# Patient Record
Sex: FEMALE | Race: BLACK OR AFRICAN AMERICAN | NOT HISPANIC OR LATINO | Employment: OTHER | URBAN - METROPOLITAN AREA
[De-identification: names, ages, dates, MRNs, and addresses within clinical notes are randomized per-mention and may not be internally consistent; named-entity substitution may affect disease eponyms.]

---

## 2017-05-04 RX ORDER — DONEPEZIL HYDROCHLORIDE 10 MG/1
10 TABLET, FILM COATED ORAL DAILY
Qty: 90 TABLET | Refills: 6 | OUTPATIENT
Start: 2017-05-04

## 2017-05-04 NOTE — TELEPHONE ENCOUNTER
Received a refill request from the patient's pharmacy for Aricept. The refill was denied by Jhoana Waite since she has not seen the patient is 2 years. Jhoana stated that if the patient would schedule a follow up, she will go ahead and give her a one month supply to last until her visit. Contacted the patient to inform her. No answer, No option to leave a voicemail. I will call again at a later time.

## 2017-07-06 ENCOUNTER — TELEPHONE (OUTPATIENT)
Dept: OPTOMETRY | Facility: CLINIC | Age: 77
End: 2017-07-06

## 2017-09-15 ENCOUNTER — TELEPHONE (OUTPATIENT)
Dept: INTERNAL MEDICINE | Facility: CLINIC | Age: 77
End: 2017-09-15

## 2017-09-15 DIAGNOSIS — E11.9 DIABETES MELLITUS TYPE 2, NONINSULIN DEPENDENT: Primary | ICD-10-CM

## 2017-09-15 NOTE — TELEPHONE ENCOUNTER
Please call patient.  They are overdue for fasting labs and an annual physical.  Please help schedule both.  If they are no longer coming here for their care please let me know so we can update their chart.

## 2017-09-18 ENCOUNTER — PATIENT OUTREACH (OUTPATIENT)
Dept: ADMINISTRATIVE | Facility: HOSPITAL | Age: 77
End: 2017-09-18

## 2017-09-18 NOTE — PROGRESS NOTES
LPN, CCC attempted to contact patient to assist with scheduling an Annual Diabetes PCP OV with labs prior.VM option not provided. Also attempted to contact patient via [phone], with multiple unanswered rings received. N, [facility] will mail patient a reminder letter.

## 2017-09-20 NOTE — TELEPHONE ENCOUNTER
Please remove from my patient list.  Thanks    Unsuccessful phone contact outreach to patient per LPN, CCC. Letter mailed.  (Routing comment)

## 2019-01-01 ENCOUNTER — HOSPITAL ENCOUNTER (INPATIENT)
Facility: HOSPITAL | Age: 79
LOS: 6 days | DRG: 064 | End: 2019-04-13
Attending: EMERGENCY MEDICINE | Admitting: PSYCHIATRY & NEUROLOGY
Payer: MEDICARE

## 2019-01-01 VITALS
HEART RATE: 96 BPM | HEIGHT: 65 IN | OXYGEN SATURATION: 99 % | SYSTOLIC BLOOD PRESSURE: 125 MMHG | TEMPERATURE: 99 F | RESPIRATION RATE: 14 BRPM | BODY MASS INDEX: 23.69 KG/M2 | WEIGHT: 142.19 LBS | DIASTOLIC BLOOD PRESSURE: 56 MMHG

## 2019-01-01 DIAGNOSIS — G93.6 CYTOTOXIC CEREBRAL EDEMA: ICD-10-CM

## 2019-01-01 DIAGNOSIS — E03.9 ACQUIRED HYPOTHYROIDISM: ICD-10-CM

## 2019-01-01 DIAGNOSIS — J96.01 ACUTE RESPIRATORY FAILURE WITH HYPOXIA: ICD-10-CM

## 2019-01-01 DIAGNOSIS — I10 HYPERTENSION, UNSPECIFIED TYPE: ICD-10-CM

## 2019-01-01 DIAGNOSIS — E11.42 TYPE 2 DIABETES MELLITUS WITH DIABETIC POLYNEUROPATHY, WITH LONG-TERM CURRENT USE OF INSULIN: ICD-10-CM

## 2019-01-01 DIAGNOSIS — N18.5 STAGE 5 CHRONIC KIDNEY DISEASE NOT ON CHRONIC DIALYSIS: ICD-10-CM

## 2019-01-01 DIAGNOSIS — Z71.89 ADVANCED CARE PLANNING/COUNSELING DISCUSSION: ICD-10-CM

## 2019-01-01 DIAGNOSIS — Z78.9: ICD-10-CM

## 2019-01-01 DIAGNOSIS — R40.2434 GLASGOW COMA SCALE TOTAL SCORE 3-8, 24 HOURS OR MORE AFTER HOSPITAL ADMISSION: ICD-10-CM

## 2019-01-01 DIAGNOSIS — Z97.8 ENDOTRACHEALLY INTUBATED: ICD-10-CM

## 2019-01-01 DIAGNOSIS — G93.5 BRAIN COMPRESSION: ICD-10-CM

## 2019-01-01 DIAGNOSIS — Z73.6 LIMITATION OF ACTIVITIES DUE TO DISABILITY: ICD-10-CM

## 2019-01-01 DIAGNOSIS — I99.8 ISCHEMIA OF HAND: ICD-10-CM

## 2019-01-01 DIAGNOSIS — Z51.5 PALLIATIVE CARE ENCOUNTER: ICD-10-CM

## 2019-01-01 DIAGNOSIS — I63.512 ACUTE ISCHEMIC LEFT MIDDLE CEREBRAL ARTERY (MCA) STROKE: Primary | ICD-10-CM

## 2019-01-01 DIAGNOSIS — Z79.4 TYPE 2 DIABETES MELLITUS WITH DIABETIC POLYNEUROPATHY, WITH LONG-TERM CURRENT USE OF INSULIN: ICD-10-CM

## 2019-01-01 DIAGNOSIS — R73.9 HYPERGLYCEMIA: ICD-10-CM

## 2019-01-01 DIAGNOSIS — I63.411 EMBOLIC STROKE INVOLVING RIGHT MIDDLE CEREBRAL ARTERY: ICD-10-CM

## 2019-01-01 DIAGNOSIS — I61.1 NONTRAUMATIC CORTICAL HEMORRHAGE OF RIGHT CEREBRAL HEMISPHERE: ICD-10-CM

## 2019-01-01 DIAGNOSIS — I63.9 STROKE: ICD-10-CM

## 2019-01-01 DIAGNOSIS — Z71.89 GOALS OF CARE, COUNSELING/DISCUSSION: ICD-10-CM

## 2019-01-01 DIAGNOSIS — N17.9 ACUTE RENAL FAILURE, UNSPECIFIED ACUTE RENAL FAILURE TYPE: ICD-10-CM

## 2019-01-01 LAB
ABO + RH BLD: NORMAL
ALBUMIN SERPL BCP-MCNC: 1.4 G/DL (ref 3.5–5.2)
ALBUMIN SERPL BCP-MCNC: 1.5 G/DL (ref 3.5–5.2)
ALBUMIN SERPL BCP-MCNC: 1.5 G/DL (ref 3.5–5.2)
ALBUMIN SERPL BCP-MCNC: 1.6 G/DL (ref 3.5–5.2)
ALBUMIN SERPL BCP-MCNC: 1.7 G/DL (ref 3.5–5.2)
ALBUMIN SERPL BCP-MCNC: 1.8 G/DL (ref 3.5–5.2)
ALBUMIN SERPL BCP-MCNC: 1.9 G/DL (ref 3.5–5.2)
ALBUMIN SERPL BCP-MCNC: 2 G/DL (ref 3.5–5.2)
ALBUMIN SERPL BCP-MCNC: 2 G/DL (ref 3.5–5.2)
ALBUMIN SERPL BCP-MCNC: 2.1 G/DL (ref 3.5–5.2)
ALBUMIN SERPL BCP-MCNC: 2.2 G/DL (ref 3.5–5.2)
ALBUMIN SERPL BCP-MCNC: 2.3 G/DL (ref 3.5–5.2)
ALBUMIN SERPL BCP-MCNC: 2.5 G/DL (ref 3.5–5.2)
ALBUMIN SERPL BCP-MCNC: 2.5 G/DL (ref 3.5–5.2)
ALBUMIN SERPL BCP-MCNC: 2.6 G/DL (ref 3.5–5.2)
ALBUMIN SERPL BCP-MCNC: 2.8 G/DL (ref 3.5–5.2)
ALBUMIN SERPL BCP-MCNC: 2.9 G/DL (ref 3.5–5.2)
ALBUMIN SERPL BCP-MCNC: 3 G/DL (ref 3.5–5.2)
ALBUMIN SERPL BCP-MCNC: 3 G/DL (ref 3.5–5.2)
ALBUMIN SERPL BCP-MCNC: 3.2 G/DL (ref 3.5–5.2)
ALBUMIN SERPL BCP-MCNC: 3.8 G/DL (ref 3.5–5.2)
ALLENS TEST: ABNORMAL
ALP SERPL-CCNC: 100 U/L (ref 55–135)
ALP SERPL-CCNC: 106 U/L (ref 55–135)
ALP SERPL-CCNC: 108 U/L (ref 55–135)
ALP SERPL-CCNC: 116 U/L (ref 55–135)
ALP SERPL-CCNC: 118 U/L (ref 55–135)
ALP SERPL-CCNC: 123 U/L (ref 55–135)
ALP SERPL-CCNC: 131 U/L (ref 55–135)
ALP SERPL-CCNC: 132 U/L (ref 55–135)
ALP SERPL-CCNC: 132 U/L (ref 55–135)
ALP SERPL-CCNC: 142 U/L (ref 55–135)
ALP SERPL-CCNC: 148 U/L (ref 55–135)
ALP SERPL-CCNC: 155 U/L (ref 55–135)
ALP SERPL-CCNC: 182 U/L (ref 55–135)
ALP SERPL-CCNC: 81 U/L (ref 55–135)
ALP SERPL-CCNC: 83 U/L (ref 55–135)
ALP SERPL-CCNC: 84 U/L (ref 55–135)
ALP SERPL-CCNC: 89 U/L (ref 55–135)
ALP SERPL-CCNC: 90 U/L (ref 55–135)
ALP SERPL-CCNC: 90 U/L (ref 55–135)
ALP SERPL-CCNC: 91 U/L (ref 55–135)
ALP SERPL-CCNC: 92 U/L (ref 55–135)
ALP SERPL-CCNC: 92 U/L (ref 55–135)
ALP SERPL-CCNC: 94 U/L (ref 55–135)
ALP SERPL-CCNC: 95 U/L (ref 55–135)
ALP SERPL-CCNC: 95 U/L (ref 55–135)
ALP SERPL-CCNC: 96 U/L (ref 55–135)
ALT SERPL W/O P-5'-P-CCNC: 15 U/L (ref 10–44)
ALT SERPL W/O P-5'-P-CCNC: 16 U/L (ref 10–44)
ALT SERPL W/O P-5'-P-CCNC: 18 U/L (ref 10–44)
ALT SERPL W/O P-5'-P-CCNC: 18 U/L (ref 10–44)
ALT SERPL W/O P-5'-P-CCNC: 22 U/L (ref 10–44)
ALT SERPL W/O P-5'-P-CCNC: 23 U/L (ref 10–44)
ALT SERPL W/O P-5'-P-CCNC: 23 U/L (ref 10–44)
ALT SERPL W/O P-5'-P-CCNC: 25 U/L (ref 10–44)
ALT SERPL W/O P-5'-P-CCNC: 30 U/L (ref 10–44)
ALT SERPL W/O P-5'-P-CCNC: 32 U/L (ref 10–44)
ALT SERPL W/O P-5'-P-CCNC: 32 U/L (ref 10–44)
ALT SERPL W/O P-5'-P-CCNC: 33 U/L (ref 10–44)
ALT SERPL W/O P-5'-P-CCNC: 40 U/L (ref 10–44)
ALT SERPL W/O P-5'-P-CCNC: 45 U/L (ref 10–44)
ALT SERPL W/O P-5'-P-CCNC: 47 U/L (ref 10–44)
ALT SERPL W/O P-5'-P-CCNC: 53 U/L (ref 10–44)
ALT SERPL W/O P-5'-P-CCNC: 61 U/L (ref 10–44)
ALT SERPL W/O P-5'-P-CCNC: 63 U/L (ref 10–44)
ALT SERPL W/O P-5'-P-CCNC: 66 U/L (ref 10–44)
ALT SERPL W/O P-5'-P-CCNC: 66 U/L (ref 10–44)
ALT SERPL W/O P-5'-P-CCNC: 68 U/L (ref 10–44)
ALT SERPL W/O P-5'-P-CCNC: 69 U/L (ref 10–44)
ALT SERPL W/O P-5'-P-CCNC: 70 U/L (ref 10–44)
ALT SERPL W/O P-5'-P-CCNC: 74 U/L (ref 10–44)
ANION GAP SERPL CALC-SCNC: 10 MMOL/L (ref 8–16)
ANION GAP SERPL CALC-SCNC: 11 MMOL/L (ref 8–16)
ANION GAP SERPL CALC-SCNC: 11 MMOL/L (ref 8–16)
ANION GAP SERPL CALC-SCNC: 13 MMOL/L (ref 8–16)
ANION GAP SERPL CALC-SCNC: 13 MMOL/L (ref 8–16)
ANION GAP SERPL CALC-SCNC: 19 MMOL/L (ref 8–16)
ANION GAP SERPL CALC-SCNC: 29 MMOL/L (ref 8–16)
ANION GAP SERPL CALC-SCNC: 34 MMOL/L (ref 8–16)
ANION GAP SERPL CALC-SCNC: 9 MMOL/L (ref 8–16)
ANION GAP SERPL CALC-SCNC: ABNORMAL MMOL/L (ref 8–16)
ANISOCYTOSIS BLD QL SMEAR: SLIGHT
ASCENDING AORTA: 2.5 CM
AST SERPL-CCNC: 101 U/L (ref 10–40)
AST SERPL-CCNC: 102 U/L (ref 10–40)
AST SERPL-CCNC: 109 U/L (ref 10–40)
AST SERPL-CCNC: 114 U/L (ref 10–40)
AST SERPL-CCNC: 119 U/L (ref 10–40)
AST SERPL-CCNC: 120 U/L (ref 10–40)
AST SERPL-CCNC: 13 U/L (ref 10–40)
AST SERPL-CCNC: 133 U/L (ref 10–40)
AST SERPL-CCNC: 143 U/L (ref 10–40)
AST SERPL-CCNC: 145 U/L (ref 10–40)
AST SERPL-CCNC: 149 U/L (ref 10–40)
AST SERPL-CCNC: 153 U/L (ref 10–40)
AST SERPL-CCNC: 157 U/L (ref 10–40)
AST SERPL-CCNC: 177 U/L (ref 10–40)
AST SERPL-CCNC: 199 U/L (ref 10–40)
AST SERPL-CCNC: 20 U/L (ref 10–40)
AST SERPL-CCNC: 200 U/L (ref 10–40)
AST SERPL-CCNC: 208 U/L (ref 10–40)
AST SERPL-CCNC: 214 U/L (ref 10–40)
AST SERPL-CCNC: 23 U/L (ref 10–40)
AST SERPL-CCNC: 40 U/L (ref 10–40)
AST SERPL-CCNC: 67 U/L (ref 10–40)
AST SERPL-CCNC: 76 U/L (ref 10–40)
AST SERPL-CCNC: 87 U/L (ref 10–40)
AST SERPL-CCNC: 87 U/L (ref 10–40)
AST SERPL-CCNC: 89 U/L (ref 10–40)
AV MEAN GRADIENT: 6.21 MMHG
AV PEAK GRADIENT: 9.86 MMHG
B-OH-BUTYR BLD STRIP-SCNC: 6.4 MMOL/L (ref 0–0.5)
BACTERIA #/AREA URNS AUTO: ABNORMAL /HPF
BACTERIA UR CULT: NORMAL
BASOPHILS # BLD AUTO: 0.02 K/UL (ref 0–0.2)
BASOPHILS # BLD AUTO: 0.02 K/UL (ref 0–0.2)
BASOPHILS # BLD AUTO: 0.03 K/UL (ref 0–0.2)
BASOPHILS # BLD AUTO: 0.03 K/UL (ref 0–0.2)
BASOPHILS # BLD AUTO: 0.04 K/UL (ref 0–0.2)
BASOPHILS # BLD AUTO: 0.04 K/UL (ref 0–0.2)
BASOPHILS # BLD AUTO: 0.05 K/UL (ref 0–0.2)
BASOPHILS NFR BLD: 0.1 % (ref 0–1.9)
BASOPHILS NFR BLD: 0.1 % (ref 0–1.9)
BASOPHILS NFR BLD: 0.2 % (ref 0–1.9)
BASOPHILS NFR BLD: 0.3 % (ref 0–1.9)
BILIRUB SERPL-MCNC: 0.4 MG/DL (ref 0.1–1)
BILIRUB SERPL-MCNC: 0.4 MG/DL (ref 0.1–1)
BILIRUB SERPL-MCNC: 0.5 MG/DL (ref 0.1–1)
BILIRUB SERPL-MCNC: 0.6 MG/DL (ref 0.1–1)
BILIRUB SERPL-MCNC: 0.6 MG/DL (ref 0.1–1)
BILIRUB SERPL-MCNC: 0.7 MG/DL (ref 0.1–1)
BILIRUB SERPL-MCNC: 0.8 MG/DL (ref 0.1–1)
BILIRUB SERPL-MCNC: 0.9 MG/DL (ref 0.1–1)
BILIRUB SERPL-MCNC: 1 MG/DL (ref 0.1–1)
BILIRUB SERPL-MCNC: 1.1 MG/DL (ref 0.1–1)
BILIRUB SERPL-MCNC: 1.1 MG/DL (ref 0.1–1)
BILIRUB SERPL-MCNC: 1.2 MG/DL (ref 0.1–1)
BILIRUB UR QL STRIP: NEGATIVE
BLD GP AB SCN CELLS X3 SERPL QL: NORMAL
BSA FOR ECHO PROCEDURE: 1.82 M2
BUN SERPL-MCNC: 29 MG/DL (ref 8–23)
BUN SERPL-MCNC: 30 MG/DL (ref 8–23)
BUN SERPL-MCNC: 30 MG/DL (ref 8–23)
BUN SERPL-MCNC: 31 MG/DL (ref 8–23)
BUN SERPL-MCNC: 32 MG/DL (ref 8–23)
BUN SERPL-MCNC: 33 MG/DL (ref 8–23)
BUN SERPL-MCNC: 34 MG/DL (ref 8–23)
BUN SERPL-MCNC: 35 MG/DL (ref 8–23)
BUN SERPL-MCNC: 37 MG/DL (ref 8–23)
BUN SERPL-MCNC: 37 MG/DL (ref 8–23)
BUN SERPL-MCNC: 38 MG/DL (ref 8–23)
BUN SERPL-MCNC: 40 MG/DL (ref 8–23)
BUN SERPL-MCNC: 40 MG/DL (ref 8–23)
BUN SERPL-MCNC: 41 MG/DL (ref 8–23)
BUN SERPL-MCNC: 43 MG/DL (ref 8–23)
BUN SERPL-MCNC: 50 MG/DL (ref 8–23)
BUN SERPL-MCNC: 61 MG/DL (ref 8–23)
BUN SERPL-MCNC: 70 MG/DL (ref 8–23)
BUN SERPL-MCNC: 82 MG/DL (ref 8–23)
BUN SERPL-MCNC: 90 MG/DL (ref 8–23)
BUN SERPL-MCNC: 93 MG/DL (ref 6–30)
BUN SERPL-MCNC: 96 MG/DL (ref 8–23)
BURR CELLS BLD QL SMEAR: ABNORMAL
BURR CELLS BLD QL SMEAR: ABNORMAL
CALCIUM SERPL-MCNC: 10.6 MG/DL (ref 8.7–10.5)
CALCIUM SERPL-MCNC: 8 MG/DL (ref 8.7–10.5)
CALCIUM SERPL-MCNC: 8.2 MG/DL (ref 8.7–10.5)
CALCIUM SERPL-MCNC: 8.5 MG/DL (ref 8.7–10.5)
CALCIUM SERPL-MCNC: 8.6 MG/DL (ref 8.7–10.5)
CALCIUM SERPL-MCNC: 8.8 MG/DL (ref 8.7–10.5)
CALCIUM SERPL-MCNC: 8.9 MG/DL (ref 8.7–10.5)
CALCIUM SERPL-MCNC: 8.9 MG/DL (ref 8.7–10.5)
CALCIUM SERPL-MCNC: 9 MG/DL (ref 8.7–10.5)
CALCIUM SERPL-MCNC: 9.1 MG/DL (ref 8.7–10.5)
CALCIUM SERPL-MCNC: 9.2 MG/DL (ref 8.7–10.5)
CALCIUM SERPL-MCNC: 9.3 MG/DL (ref 8.7–10.5)
CALCIUM SERPL-MCNC: 9.3 MG/DL (ref 8.7–10.5)
CALCIUM SERPL-MCNC: 9.4 MG/DL (ref 8.7–10.5)
CALCIUM SERPL-MCNC: 9.5 MG/DL (ref 8.7–10.5)
CALCIUM SERPL-MCNC: 9.5 MG/DL (ref 8.7–10.5)
CALCIUM SERPL-MCNC: 9.6 MG/DL (ref 8.7–10.5)
CALCIUM SERPL-MCNC: 9.7 MG/DL (ref 8.7–10.5)
CALCIUM SERPL-MCNC: 9.9 MG/DL (ref 8.7–10.5)
CHLORIDE SERPL-SCNC: 110 MMOL/L (ref 95–110)
CHLORIDE SERPL-SCNC: 114 MMOL/L (ref 95–110)
CHLORIDE SERPL-SCNC: 118 MMOL/L (ref 95–110)
CHLORIDE SERPL-SCNC: 123 MMOL/L (ref 95–110)
CHLORIDE SERPL-SCNC: 124 MMOL/L (ref 95–110)
CHLORIDE SERPL-SCNC: 124 MMOL/L (ref 95–110)
CHLORIDE SERPL-SCNC: 125 MMOL/L (ref 95–110)
CHLORIDE SERPL-SCNC: 126 MMOL/L (ref 95–110)
CHLORIDE SERPL-SCNC: 126 MMOL/L (ref 95–110)
CHLORIDE SERPL-SCNC: 127 MMOL/L (ref 95–110)
CHLORIDE SERPL-SCNC: 128 MMOL/L (ref 95–110)
CHLORIDE SERPL-SCNC: 130 MMOL/L (ref 95–110)
CHLORIDE SERPL-SCNC: 130 MMOL/L (ref 95–110)
CHLORIDE SERPL-SCNC: >130 MMOL/L (ref 95–110)
CHOLEST SERPL-MCNC: 290 MG/DL (ref 120–199)
CHOLEST/HDLC SERPL: 5.9 {RATIO} (ref 2–5)
CLARITY UR REFRACT.AUTO: ABNORMAL
CO2 SERPL-SCNC: 11 MMOL/L (ref 23–29)
CO2 SERPL-SCNC: 15 MMOL/L (ref 23–29)
CO2 SERPL-SCNC: 19 MMOL/L (ref 23–29)
CO2 SERPL-SCNC: 21 MMOL/L (ref 23–29)
CO2 SERPL-SCNC: 22 MMOL/L (ref 23–29)
CO2 SERPL-SCNC: 23 MMOL/L (ref 23–29)
CO2 SERPL-SCNC: 24 MMOL/L (ref 23–29)
CO2 SERPL-SCNC: 25 MMOL/L (ref 23–29)
CO2 SERPL-SCNC: 26 MMOL/L (ref 23–29)
COLOR UR AUTO: YELLOW
CREAT SERPL-MCNC: 0.9 MG/DL (ref 0.5–1.4)
CREAT SERPL-MCNC: 1 MG/DL (ref 0.5–1.4)
CREAT SERPL-MCNC: 1.1 MG/DL (ref 0.5–1.4)
CREAT SERPL-MCNC: 1.2 MG/DL (ref 0.5–1.4)
CREAT SERPL-MCNC: 1.3 MG/DL (ref 0.5–1.4)
CREAT SERPL-MCNC: 1.3 MG/DL (ref 0.5–1.4)
CREAT SERPL-MCNC: 1.4 MG/DL (ref 0.5–1.4)
CREAT SERPL-MCNC: 1.5 MG/DL (ref 0.5–1.4)
CREAT SERPL-MCNC: 1.6 MG/DL (ref 0.5–1.4)
CREAT SERPL-MCNC: 1.6 MG/DL (ref 0.5–1.4)
CREAT SERPL-MCNC: 1.8 MG/DL (ref 0.5–1.4)
CREAT SERPL-MCNC: 1.8 MG/DL (ref 0.5–1.4)
CREAT SERPL-MCNC: 1.9 MG/DL (ref 0.5–1.4)
CREAT SERPL-MCNC: 2 MG/DL (ref 0.5–1.4)
CREAT SERPL-MCNC: 2.1 MG/DL (ref 0.5–1.4)
CREAT SERPL-MCNC: 2.9 MG/DL (ref 0.5–1.4)
CREAT SERPL-MCNC: 2.9 MG/DL (ref 0.5–1.4)
CREAT SERPL-MCNC: 3 MG/DL (ref 0.5–1.4)
CREAT SERPL-MCNC: 3.5 MG/DL (ref 0.5–1.4)
CREAT SERPL-MCNC: 3.9 MG/DL (ref 0.5–1.4)
CV ECHO LV RWT: 0.47 CM
DACRYOCYTES BLD QL SMEAR: ABNORMAL
DELSYS: ABNORMAL
DIFFERENTIAL METHOD: ABNORMAL
DOP CALC AO PEAK VEL: 1.57 M/S
DOP CALC AO VTI: 23.27 CM
DOP CALC LVOT AREA: 2.63 CM2
DOP CALC LVOT DIAMETER: 1.83 CM
E WAVE DECELERATION TIME: 458.09 MSEC
E/A RATIO: 0.43
E/E' RATIO: 7.14
ECHO LV POSTERIOR WALL: 0.65 CM (ref 0.6–1.1)
EOSINOPHIL # BLD AUTO: 0 K/UL (ref 0–0.5)
EOSINOPHIL # BLD AUTO: 0.1 K/UL (ref 0–0.5)
EOSINOPHIL # BLD AUTO: 0.2 K/UL (ref 0–0.5)
EOSINOPHIL NFR BLD: 0 % (ref 0–8)
EOSINOPHIL NFR BLD: 0.2 % (ref 0–8)
EOSINOPHIL NFR BLD: 0.2 % (ref 0–8)
EOSINOPHIL NFR BLD: 0.3 % (ref 0–8)
EOSINOPHIL NFR BLD: 1.2 % (ref 0–8)
ERYTHROCYTE [DISTWIDTH] IN BLOOD BY AUTOMATED COUNT: 13.4 % (ref 11.5–14.5)
ERYTHROCYTE [DISTWIDTH] IN BLOOD BY AUTOMATED COUNT: 13.4 % (ref 11.5–14.5)
ERYTHROCYTE [DISTWIDTH] IN BLOOD BY AUTOMATED COUNT: 13.9 % (ref 11.5–14.5)
ERYTHROCYTE [DISTWIDTH] IN BLOOD BY AUTOMATED COUNT: 14 % (ref 11.5–14.5)
ERYTHROCYTE [DISTWIDTH] IN BLOOD BY AUTOMATED COUNT: 14.3 % (ref 11.5–14.5)
ERYTHROCYTE [DISTWIDTH] IN BLOOD BY AUTOMATED COUNT: 14.6 % (ref 11.5–14.5)
ERYTHROCYTE [DISTWIDTH] IN BLOOD BY AUTOMATED COUNT: 14.7 % (ref 11.5–14.5)
ERYTHROCYTE [SEDIMENTATION RATE] IN BLOOD BY WESTERGREN METHOD: 14 MM/H
EST. GFR  (AFRICAN AMERICAN): 12.2 ML/MIN/1.73 M^2
EST. GFR  (AFRICAN AMERICAN): 13.9 ML/MIN/1.73 M^2
EST. GFR  (AFRICAN AMERICAN): 17.5 ML/MIN/1.73 M^2
EST. GFR  (AFRICAN AMERICAN): 25.8 ML/MIN/1.73 M^2
EST. GFR  (AFRICAN AMERICAN): 27.2 ML/MIN/1.73 M^2
EST. GFR  (AFRICAN AMERICAN): 28.9 ML/MIN/1.73 M^2
EST. GFR  (AFRICAN AMERICAN): 30.9 ML/MIN/1.73 M^2
EST. GFR  (AFRICAN AMERICAN): 31.1 ML/MIN/1.73 M^2
EST. GFR  (AFRICAN AMERICAN): 35.6 ML/MIN/1.73 M^2
EST. GFR  (AFRICAN AMERICAN): 35.6 ML/MIN/1.73 M^2
EST. GFR  (AFRICAN AMERICAN): 38.7 ML/MIN/1.73 M^2
EST. GFR  (AFRICAN AMERICAN): 41.8 ML/MIN/1.73 M^2
EST. GFR  (AFRICAN AMERICAN): 41.8 ML/MIN/1.73 M^2
EST. GFR  (AFRICAN AMERICAN): 42.1 ML/MIN/1.73 M^2
EST. GFR  (AFRICAN AMERICAN): 45.7 ML/MIN/1.73 M^2
EST. GFR  (AFRICAN AMERICAN): 45.7 ML/MIN/1.73 M^2
EST. GFR  (AFRICAN AMERICAN): 50.4 ML/MIN/1.73 M^2
EST. GFR  (AFRICAN AMERICAN): 56 ML/MIN/1.73 M^2
EST. GFR  (AFRICAN AMERICAN): >60 ML/MIN/1.73 M^2
EST. GFR  (NON AFRICAN AMERICAN): 10.6 ML/MIN/1.73 M^2
EST. GFR  (NON AFRICAN AMERICAN): 12.1 ML/MIN/1.73 M^2
EST. GFR  (NON AFRICAN AMERICAN): 15.1 ML/MIN/1.73 M^2
EST. GFR  (NON AFRICAN AMERICAN): 22.4 ML/MIN/1.73 M^2
EST. GFR  (NON AFRICAN AMERICAN): 23.6 ML/MIN/1.73 M^2
EST. GFR  (NON AFRICAN AMERICAN): 25.1 ML/MIN/1.73 M^2
EST. GFR  (NON AFRICAN AMERICAN): 26.8 ML/MIN/1.73 M^2
EST. GFR  (NON AFRICAN AMERICAN): 27 ML/MIN/1.73 M^2
EST. GFR  (NON AFRICAN AMERICAN): 30.9 ML/MIN/1.73 M^2
EST. GFR  (NON AFRICAN AMERICAN): 30.9 ML/MIN/1.73 M^2
EST. GFR  (NON AFRICAN AMERICAN): 33.6 ML/MIN/1.73 M^2
EST. GFR  (NON AFRICAN AMERICAN): 36.3 ML/MIN/1.73 M^2
EST. GFR  (NON AFRICAN AMERICAN): 36.3 ML/MIN/1.73 M^2
EST. GFR  (NON AFRICAN AMERICAN): 36.5 ML/MIN/1.73 M^2
EST. GFR  (NON AFRICAN AMERICAN): 39.7 ML/MIN/1.73 M^2
EST. GFR  (NON AFRICAN AMERICAN): 39.7 ML/MIN/1.73 M^2
EST. GFR  (NON AFRICAN AMERICAN): 43.7 ML/MIN/1.73 M^2
EST. GFR  (NON AFRICAN AMERICAN): 48.5 ML/MIN/1.73 M^2
EST. GFR  (NON AFRICAN AMERICAN): 54.5 ML/MIN/1.73 M^2
EST. GFR  (NON AFRICAN AMERICAN): >60 ML/MIN/1.73 M^2
ESTIMATED AVG GLUCOSE: 280 MG/DL (ref 68–131)
FIO2: 40
FRACTIONAL SHORTENING: 33 % (ref 28–44)
GIANT PLATELETS BLD QL SMEAR: PRESENT
GLUCOSE SERPL-MCNC: 115 MG/DL (ref 70–110)
GLUCOSE SERPL-MCNC: 122 MG/DL (ref 70–110)
GLUCOSE SERPL-MCNC: 126 MG/DL (ref 70–110)
GLUCOSE SERPL-MCNC: 137 MG/DL (ref 70–110)
GLUCOSE SERPL-MCNC: 140 MG/DL (ref 70–110)
GLUCOSE SERPL-MCNC: 150 MG/DL (ref 70–110)
GLUCOSE SERPL-MCNC: 154 MG/DL (ref 70–110)
GLUCOSE SERPL-MCNC: 159 MG/DL (ref 70–110)
GLUCOSE SERPL-MCNC: 160 MG/DL (ref 70–110)
GLUCOSE SERPL-MCNC: 160 MG/DL (ref 70–110)
GLUCOSE SERPL-MCNC: 163 MG/DL (ref 70–110)
GLUCOSE SERPL-MCNC: 165 MG/DL (ref 70–110)
GLUCOSE SERPL-MCNC: 173 MG/DL (ref 70–110)
GLUCOSE SERPL-MCNC: 186 MG/DL (ref 70–110)
GLUCOSE SERPL-MCNC: 194 MG/DL (ref 70–110)
GLUCOSE SERPL-MCNC: 200 MG/DL (ref 70–110)
GLUCOSE SERPL-MCNC: 202 MG/DL (ref 70–110)
GLUCOSE SERPL-MCNC: 230 MG/DL (ref 70–110)
GLUCOSE SERPL-MCNC: 249 MG/DL (ref 70–110)
GLUCOSE SERPL-MCNC: 298 MG/DL (ref 70–110)
GLUCOSE SERPL-MCNC: 368 MG/DL (ref 70–110)
GLUCOSE SERPL-MCNC: 536 MG/DL (ref 70–110)
GLUCOSE SERPL-MCNC: 576 MG/DL (ref 70–110)
GLUCOSE SERPL-MCNC: 752 MG/DL (ref 70–110)
GLUCOSE SERPL-MCNC: 81 MG/DL (ref 70–110)
GLUCOSE SERPL-MCNC: 899 MG/DL (ref 70–110)
GLUCOSE SERPL-MCNC: >700 MG/DL (ref 70–110)
GLUCOSE UR QL STRIP: ABNORMAL
HBA1C MFR BLD HPLC: 11.4 % (ref 4–5.6)
HCO3 UR-SCNC: 14.3 MMOL/L (ref 24–28)
HCO3 UR-SCNC: 19.3 MMOL/L (ref 24–28)
HCO3 UR-SCNC: 20.7 MMOL/L (ref 24–28)
HCO3 UR-SCNC: 21.4 MMOL/L (ref 24–28)
HCO3 UR-SCNC: 23.2 MMOL/L (ref 24–28)
HCO3 UR-SCNC: 29.6 MMOL/L (ref 24–28)
HCT VFR BLD AUTO: 26.2 % (ref 37–48.5)
HCT VFR BLD AUTO: 30.1 % (ref 37–48.5)
HCT VFR BLD AUTO: 32.1 % (ref 37–48.5)
HCT VFR BLD AUTO: 35.9 % (ref 37–48.5)
HCT VFR BLD AUTO: 38.7 % (ref 37–48.5)
HCT VFR BLD AUTO: 40.5 % (ref 37–48.5)
HCT VFR BLD AUTO: 51.6 % (ref 37–48.5)
HCT VFR BLD CALC: 48 %PCV (ref 36–54)
HDLC SERPL-MCNC: 49 MG/DL (ref 40–75)
HDLC SERPL: 16.9 % (ref 20–50)
HGB BLD-MCNC: 10.5 G/DL (ref 12–16)
HGB BLD-MCNC: 11.5 G/DL (ref 12–16)
HGB BLD-MCNC: 12.3 G/DL (ref 12–16)
HGB BLD-MCNC: 14.6 G/DL (ref 12–16)
HGB BLD-MCNC: 7.8 G/DL (ref 12–16)
HGB BLD-MCNC: 8.6 G/DL (ref 12–16)
HGB BLD-MCNC: 9.7 G/DL (ref 12–16)
HGB UR QL STRIP: ABNORMAL
HYPOCHROMIA BLD QL SMEAR: ABNORMAL
IMM GRANULOCYTES # BLD AUTO: 0.09 K/UL (ref 0–0.04)
IMM GRANULOCYTES # BLD AUTO: 0.13 K/UL (ref 0–0.04)
IMM GRANULOCYTES # BLD AUTO: 0.17 K/UL (ref 0–0.04)
IMM GRANULOCYTES # BLD AUTO: 0.21 K/UL (ref 0–0.04)
IMM GRANULOCYTES # BLD AUTO: 0.6 K/UL (ref 0–0.04)
IMM GRANULOCYTES NFR BLD AUTO: 0.5 % (ref 0–0.5)
IMM GRANULOCYTES NFR BLD AUTO: 0.6 % (ref 0–0.5)
IMM GRANULOCYTES NFR BLD AUTO: 0.7 % (ref 0–0.5)
IMM GRANULOCYTES NFR BLD AUTO: 0.7 % (ref 0–0.5)
IMM GRANULOCYTES NFR BLD AUTO: 0.8 % (ref 0–0.5)
IMM GRANULOCYTES NFR BLD AUTO: 1.1 % (ref 0–0.5)
IMM GRANULOCYTES NFR BLD AUTO: 3.5 % (ref 0–0.5)
INR PPP: 1.1 (ref 0.8–1.2)
INTERVENTRICULAR SEPTUM: 0.77 CM (ref 0.6–1.1)
KETONES UR QL STRIP: ABNORMAL
LA MAJOR: 4.8 CM
LA MINOR: 4.4 CM
LA WIDTH: 2.95 CM
LACTATE SERPL-SCNC: 2.7 MMOL/L (ref 0.5–2.2)
LACTATE SERPL-SCNC: 3.7 MMOL/L (ref 0.5–2.2)
LDLC SERPL CALC-MCNC: 192.2 MG/DL (ref 63–159)
LEFT ATRIUM SIZE: 2.42 CM
LEFT ATRIUM VOLUME INDEX: 16.3 ML/M2
LEFT ATRIUM VOLUME: 27.86 CM3
LEFT INTERNAL DIMENSION IN SYSTOLE: 1.85 CM (ref 2.1–4)
LEFT VENTRICLE DIASTOLIC VOLUME INDEX: 16.68 ML/M2
LEFT VENTRICLE DIASTOLIC VOLUME: 28.54 ML
LEFT VENTRICLE MASS INDEX: 25.6 G/M2
LEFT VENTRICLE SYSTOLIC VOLUME INDEX: 6.1 ML/M2
LEFT VENTRICLE SYSTOLIC VOLUME: 10.44 ML
LEFT VENTRICULAR INTERNAL DIMENSION IN DIASTOLE: 2.76 CM (ref 3.5–6)
LEFT VENTRICULAR MASS: 43.87 G
LEUKOCYTE ESTERASE UR QL STRIP: ABNORMAL
LV LATERAL E/E' RATIO: 7.14
LV SEPTAL E/E' RATIO: 7.14
LYMPHOCYTES # BLD AUTO: 1.2 K/UL (ref 1–4.8)
LYMPHOCYTES # BLD AUTO: 1.4 K/UL (ref 1–4.8)
LYMPHOCYTES # BLD AUTO: 1.4 K/UL (ref 1–4.8)
LYMPHOCYTES # BLD AUTO: 1.8 K/UL (ref 1–4.8)
LYMPHOCYTES # BLD AUTO: 1.9 K/UL (ref 1–4.8)
LYMPHOCYTES # BLD AUTO: 1.9 K/UL (ref 1–4.8)
LYMPHOCYTES # BLD AUTO: 2.8 K/UL (ref 1–4.8)
LYMPHOCYTES NFR BLD: 10 % (ref 18–48)
LYMPHOCYTES NFR BLD: 16.2 % (ref 18–48)
LYMPHOCYTES NFR BLD: 5.8 % (ref 18–48)
LYMPHOCYTES NFR BLD: 7.7 % (ref 18–48)
LYMPHOCYTES NFR BLD: 7.8 % (ref 18–48)
LYMPHOCYTES NFR BLD: 8.6 % (ref 18–48)
LYMPHOCYTES NFR BLD: 9.6 % (ref 18–48)
MAGNESIUM SERPL-MCNC: 2.2 MG/DL (ref 1.6–2.6)
MAGNESIUM SERPL-MCNC: 2.2 MG/DL (ref 1.6–2.6)
MAGNESIUM SERPL-MCNC: 2.4 MG/DL (ref 1.6–2.6)
MAGNESIUM SERPL-MCNC: 2.4 MG/DL (ref 1.6–2.6)
MAGNESIUM SERPL-MCNC: 2.6 MG/DL (ref 1.6–2.6)
MAGNESIUM SERPL-MCNC: 3.1 MG/DL (ref 1.6–2.6)
MCH RBC QN AUTO: 22.4 PG (ref 27–31)
MCH RBC QN AUTO: 22.5 PG (ref 27–31)
MCH RBC QN AUTO: 22.7 PG (ref 27–31)
MCH RBC QN AUTO: 22.7 PG (ref 27–31)
MCH RBC QN AUTO: 22.9 PG (ref 27–31)
MCH RBC QN AUTO: 23 PG (ref 27–31)
MCH RBC QN AUTO: 23 PG (ref 27–31)
MCHC RBC AUTO-ENTMCNC: 28.3 G/DL (ref 32–36)
MCHC RBC AUTO-ENTMCNC: 28.6 G/DL (ref 32–36)
MCHC RBC AUTO-ENTMCNC: 29.2 G/DL (ref 32–36)
MCHC RBC AUTO-ENTMCNC: 29.7 G/DL (ref 32–36)
MCHC RBC AUTO-ENTMCNC: 29.8 G/DL (ref 32–36)
MCHC RBC AUTO-ENTMCNC: 30.2 G/DL (ref 32–36)
MCHC RBC AUTO-ENTMCNC: 30.4 G/DL (ref 32–36)
MCV RBC AUTO: 74 FL (ref 82–98)
MCV RBC AUTO: 76 FL (ref 82–98)
MCV RBC AUTO: 77 FL (ref 82–98)
MCV RBC AUTO: 77 FL (ref 82–98)
MCV RBC AUTO: 78 FL (ref 82–98)
MCV RBC AUTO: 78 FL (ref 82–98)
MCV RBC AUTO: 81 FL (ref 82–98)
MICROSCOPIC COMMENT: ABNORMAL
MIN VOL: 6.18
MIN VOL: 6.49
MIN VOL: 6.5
MIN VOL: 6.52
MIN VOL: 7
MIN VOL: 7
MODE: ABNORMAL
MONOCYTES # BLD AUTO: 0.7 K/UL (ref 0.3–1)
MONOCYTES # BLD AUTO: 0.8 K/UL (ref 0.3–1)
MONOCYTES # BLD AUTO: 0.9 K/UL (ref 0.3–1)
MONOCYTES # BLD AUTO: 1 K/UL (ref 0.3–1)
MONOCYTES # BLD AUTO: 1.1 K/UL (ref 0.3–1)
MONOCYTES # BLD AUTO: 1.2 K/UL (ref 0.3–1)
MONOCYTES # BLD AUTO: 1.2 K/UL (ref 0.3–1)
MONOCYTES NFR BLD: 3.8 % (ref 4–15)
MONOCYTES NFR BLD: 4 % (ref 4–15)
MONOCYTES NFR BLD: 5.1 % (ref 4–15)
MONOCYTES NFR BLD: 5.2 % (ref 4–15)
MONOCYTES NFR BLD: 5.2 % (ref 4–15)
MONOCYTES NFR BLD: 5.6 % (ref 4–15)
MONOCYTES NFR BLD: 6.8 % (ref 4–15)
MPV, BLUE TOP: 11.5 FL (ref 9.2–12.9)
MV PEAK A VEL: 1.16 M/S
MV PEAK E VEL: 0.5 M/S
NEUTROPHILS # BLD AUTO: 12.5 K/UL (ref 1.8–7.7)
NEUTROPHILS # BLD AUTO: 15.6 K/UL (ref 1.8–7.7)
NEUTROPHILS # BLD AUTO: 15.6 K/UL (ref 1.8–7.7)
NEUTROPHILS # BLD AUTO: 15.8 K/UL (ref 1.8–7.7)
NEUTROPHILS # BLD AUTO: 17 K/UL (ref 1.8–7.7)
NEUTROPHILS # BLD AUTO: 18 K/UL (ref 1.8–7.7)
NEUTROPHILS # BLD AUTO: 18.2 K/UL (ref 1.8–7.7)
NEUTROPHILS NFR BLD: 72.1 % (ref 38–73)
NEUTROPHILS NFR BLD: 83.2 % (ref 38–73)
NEUTROPHILS NFR BLD: 85 % (ref 38–73)
NEUTROPHILS NFR BLD: 85.3 % (ref 38–73)
NEUTROPHILS NFR BLD: 86 % (ref 38–73)
NEUTROPHILS NFR BLD: 87.7 % (ref 38–73)
NEUTROPHILS NFR BLD: 88.2 % (ref 38–73)
NITRITE UR QL STRIP: NEGATIVE
NONHDLC SERPL-MCNC: 241 MG/DL
NRBC BLD-RTO: 0 /100 WBC
NRBC BLD-RTO: 1 /100 WBC
NRBC BLD-RTO: 2 /100 WBC
NRBC BLD-RTO: 3 /100 WBC
OSMOLALITY SERPL: 364 MOSM/KG (ref 275–295)
OVALOCYTES BLD QL SMEAR: ABNORMAL
OVALOCYTES BLD QL SMEAR: ABNORMAL
PCO2 BLDA: 29.1 MMHG (ref 35–45)
PCO2 BLDA: 29.2 MMHG (ref 35–45)
PCO2 BLDA: 30.3 MMHG (ref 35–45)
PCO2 BLDA: 31.9 MMHG (ref 35–45)
PCO2 BLDA: 32.2 MMHG (ref 35–45)
PCO2 BLDA: 33.6 MMHG (ref 35–45)
PEEP: 5
PH SMN: 7.3 [PH] (ref 7.35–7.45)
PH SMN: 7.39 [PH] (ref 7.35–7.45)
PH SMN: 7.42 [PH] (ref 7.35–7.45)
PH SMN: 7.45 [PH] (ref 7.35–7.45)
PH SMN: 7.46 [PH] (ref 7.35–7.45)
PH SMN: 7.61 [PH] (ref 7.35–7.45)
PH UR STRIP: 5 [PH] (ref 5–8)
PHOSPHATE SERPL-MCNC: 1.3 MG/DL (ref 2.7–4.5)
PHOSPHATE SERPL-MCNC: 2.3 MG/DL (ref 2.7–4.5)
PHOSPHATE SERPL-MCNC: 2.4 MG/DL (ref 2.7–4.5)
PHOSPHATE SERPL-MCNC: 2.8 MG/DL (ref 2.7–4.5)
PHOSPHATE SERPL-MCNC: 3.1 MG/DL (ref 2.7–4.5)
PHOSPHATE SERPL-MCNC: 3.3 MG/DL (ref 2.7–4.5)
PIP: 20
PIP: 21
PIP: 21
PIP: 22
PIP: 22
PIP: 23
PISA TR MAX VEL: 2.49 M/S
PLATELET # BLD AUTO: 154 K/UL (ref 150–350)
PLATELET # BLD AUTO: 224 K/UL (ref 150–350)
PLATELET # BLD AUTO: 326 K/UL (ref 150–350)
PLATELET # BLD AUTO: 87 K/UL (ref 150–350)
PLATELET # BLD AUTO: 88 K/UL (ref 150–350)
PLATELET # BLD AUTO: 91 K/UL (ref 150–350)
PLATELET # BLD AUTO: ABNORMAL K/UL (ref 150–350)
PLATELET BLD QL SMEAR: ABNORMAL
PLATELET, BLUE TOP: 96 K/UL (ref 150–350)
PMV BLD AUTO: 11.8 FL (ref 9.2–12.9)
PMV BLD AUTO: 12.1 FL (ref 9.2–12.9)
PMV BLD AUTO: 12.2 FL (ref 9.2–12.9)
PMV BLD AUTO: 12.6 FL (ref 9.2–12.9)
PMV BLD AUTO: 12.7 FL (ref 9.2–12.9)
PMV BLD AUTO: ABNORMAL FL (ref 9.2–12.9)
PMV BLD AUTO: ABNORMAL FL (ref 9.2–12.9)
PO2 BLDA: 205 MMHG (ref 80–100)
PO2 BLDA: 207 MMHG (ref 80–100)
PO2 BLDA: 217 MMHG (ref 80–100)
PO2 BLDA: 219 MMHG (ref 80–100)
PO2 BLDA: 221 MMHG (ref 80–100)
PO2 BLDA: 64 MMHG (ref 80–100)
POC BE: -1 MMOL/L
POC BE: -12 MMOL/L
POC BE: -2 MMOL/L
POC BE: -4 MMOL/L
POC BE: -6 MMOL/L
POC BE: 8 MMOL/L
POC IONIZED CALCIUM: 1.1 MMOL/L (ref 1.06–1.42)
POC PTINR: 1.4 (ref 0.9–1.2)
POC PTWBT: 16 SEC (ref 9.7–14.3)
POC SATURATED O2: 100 % (ref 95–100)
POC SATURATED O2: 92 % (ref 95–100)
POC TCO2 (MEASURED): 20 MMOL/L (ref 23–29)
POC TCO2: 15 MMOL/L (ref 23–27)
POC TCO2: 20 MMOL/L (ref 23–27)
POC TCO2: 22 MMOL/L (ref 23–27)
POC TCO2: 22 MMOL/L (ref 23–27)
POC TCO2: 24 MMOL/L (ref 23–27)
POC TCO2: 30 MMOL/L (ref 23–27)
POCT GLUCOSE: 102 MG/DL (ref 70–110)
POCT GLUCOSE: 105 MG/DL (ref 70–110)
POCT GLUCOSE: 106 MG/DL (ref 70–110)
POCT GLUCOSE: 113 MG/DL (ref 70–110)
POCT GLUCOSE: 120 MG/DL (ref 70–110)
POCT GLUCOSE: 120 MG/DL (ref 70–110)
POCT GLUCOSE: 124 MG/DL (ref 70–110)
POCT GLUCOSE: 125 MG/DL (ref 70–110)
POCT GLUCOSE: 125 MG/DL (ref 70–110)
POCT GLUCOSE: 128 MG/DL (ref 70–110)
POCT GLUCOSE: 129 MG/DL (ref 70–110)
POCT GLUCOSE: 130 MG/DL (ref 70–110)
POCT GLUCOSE: 133 MG/DL (ref 70–110)
POCT GLUCOSE: 138 MG/DL (ref 70–110)
POCT GLUCOSE: 138 MG/DL (ref 70–110)
POCT GLUCOSE: 140 MG/DL (ref 70–110)
POCT GLUCOSE: 143 MG/DL (ref 70–110)
POCT GLUCOSE: 145 MG/DL (ref 70–110)
POCT GLUCOSE: 146 MG/DL (ref 70–110)
POCT GLUCOSE: 147 MG/DL (ref 70–110)
POCT GLUCOSE: 148 MG/DL (ref 70–110)
POCT GLUCOSE: 149 MG/DL (ref 70–110)
POCT GLUCOSE: 153 MG/DL (ref 70–110)
POCT GLUCOSE: 154 MG/DL (ref 70–110)
POCT GLUCOSE: 156 MG/DL (ref 70–110)
POCT GLUCOSE: 156 MG/DL (ref 70–110)
POCT GLUCOSE: 157 MG/DL (ref 70–110)
POCT GLUCOSE: 158 MG/DL (ref 70–110)
POCT GLUCOSE: 158 MG/DL (ref 70–110)
POCT GLUCOSE: 159 MG/DL (ref 70–110)
POCT GLUCOSE: 160 MG/DL (ref 70–110)
POCT GLUCOSE: 161 MG/DL (ref 70–110)
POCT GLUCOSE: 163 MG/DL (ref 70–110)
POCT GLUCOSE: 164 MG/DL (ref 70–110)
POCT GLUCOSE: 165 MG/DL (ref 70–110)
POCT GLUCOSE: 166 MG/DL (ref 70–110)
POCT GLUCOSE: 167 MG/DL (ref 70–110)
POCT GLUCOSE: 167 MG/DL (ref 70–110)
POCT GLUCOSE: 168 MG/DL (ref 70–110)
POCT GLUCOSE: 169 MG/DL (ref 70–110)
POCT GLUCOSE: 169 MG/DL (ref 70–110)
POCT GLUCOSE: 171 MG/DL (ref 70–110)
POCT GLUCOSE: 173 MG/DL (ref 70–110)
POCT GLUCOSE: 174 MG/DL (ref 70–110)
POCT GLUCOSE: 175 MG/DL (ref 70–110)
POCT GLUCOSE: 175 MG/DL (ref 70–110)
POCT GLUCOSE: 177 MG/DL (ref 70–110)
POCT GLUCOSE: 178 MG/DL (ref 70–110)
POCT GLUCOSE: 178 MG/DL (ref 70–110)
POCT GLUCOSE: 179 MG/DL (ref 70–110)
POCT GLUCOSE: 180 MG/DL (ref 70–110)
POCT GLUCOSE: 180 MG/DL (ref 70–110)
POCT GLUCOSE: 181 MG/DL (ref 70–110)
POCT GLUCOSE: 182 MG/DL (ref 70–110)
POCT GLUCOSE: 183 MG/DL (ref 70–110)
POCT GLUCOSE: 184 MG/DL (ref 70–110)
POCT GLUCOSE: 185 MG/DL (ref 70–110)
POCT GLUCOSE: 185 MG/DL (ref 70–110)
POCT GLUCOSE: 187 MG/DL (ref 70–110)
POCT GLUCOSE: 188 MG/DL (ref 70–110)
POCT GLUCOSE: 189 MG/DL (ref 70–110)
POCT GLUCOSE: 190 MG/DL (ref 70–110)
POCT GLUCOSE: 190 MG/DL (ref 70–110)
POCT GLUCOSE: 192 MG/DL (ref 70–110)
POCT GLUCOSE: 193 MG/DL (ref 70–110)
POCT GLUCOSE: 194 MG/DL (ref 70–110)
POCT GLUCOSE: 195 MG/DL (ref 70–110)
POCT GLUCOSE: 195 MG/DL (ref 70–110)
POCT GLUCOSE: 197 MG/DL (ref 70–110)
POCT GLUCOSE: 198 MG/DL (ref 70–110)
POCT GLUCOSE: 199 MG/DL (ref 70–110)
POCT GLUCOSE: 201 MG/DL (ref 70–110)
POCT GLUCOSE: 201 MG/DL (ref 70–110)
POCT GLUCOSE: 203 MG/DL (ref 70–110)
POCT GLUCOSE: 204 MG/DL (ref 70–110)
POCT GLUCOSE: 204 MG/DL (ref 70–110)
POCT GLUCOSE: 207 MG/DL (ref 70–110)
POCT GLUCOSE: 207 MG/DL (ref 70–110)
POCT GLUCOSE: 208 MG/DL (ref 70–110)
POCT GLUCOSE: 211 MG/DL (ref 70–110)
POCT GLUCOSE: 211 MG/DL (ref 70–110)
POCT GLUCOSE: 214 MG/DL (ref 70–110)
POCT GLUCOSE: 214 MG/DL (ref 70–110)
POCT GLUCOSE: 219 MG/DL (ref 70–110)
POCT GLUCOSE: 221 MG/DL (ref 70–110)
POCT GLUCOSE: 222 MG/DL (ref 70–110)
POCT GLUCOSE: 224 MG/DL (ref 70–110)
POCT GLUCOSE: 225 MG/DL (ref 70–110)
POCT GLUCOSE: 226 MG/DL (ref 70–110)
POCT GLUCOSE: 226 MG/DL (ref 70–110)
POCT GLUCOSE: 234 MG/DL (ref 70–110)
POCT GLUCOSE: 234 MG/DL (ref 70–110)
POCT GLUCOSE: 239 MG/DL (ref 70–110)
POCT GLUCOSE: 240 MG/DL (ref 70–110)
POCT GLUCOSE: 242 MG/DL (ref 70–110)
POCT GLUCOSE: 245 MG/DL (ref 70–110)
POCT GLUCOSE: 245 MG/DL (ref 70–110)
POCT GLUCOSE: 251 MG/DL (ref 70–110)
POCT GLUCOSE: 252 MG/DL (ref 70–110)
POCT GLUCOSE: 259 MG/DL (ref 70–110)
POCT GLUCOSE: 262 MG/DL (ref 70–110)
POCT GLUCOSE: 264 MG/DL (ref 70–110)
POCT GLUCOSE: 267 MG/DL (ref 70–110)
POCT GLUCOSE: 270 MG/DL (ref 70–110)
POCT GLUCOSE: 276 MG/DL (ref 70–110)
POCT GLUCOSE: 278 MG/DL (ref 70–110)
POCT GLUCOSE: 282 MG/DL (ref 70–110)
POCT GLUCOSE: 286 MG/DL (ref 70–110)
POCT GLUCOSE: 308 MG/DL (ref 70–110)
POCT GLUCOSE: 312 MG/DL (ref 70–110)
POCT GLUCOSE: 347 MG/DL (ref 70–110)
POCT GLUCOSE: 348 MG/DL (ref 70–110)
POCT GLUCOSE: 353 MG/DL (ref 70–110)
POCT GLUCOSE: 354 MG/DL (ref 70–110)
POCT GLUCOSE: 364 MG/DL (ref 70–110)
POCT GLUCOSE: 367 MG/DL (ref 70–110)
POCT GLUCOSE: 369 MG/DL (ref 70–110)
POCT GLUCOSE: 402 MG/DL (ref 70–110)
POCT GLUCOSE: 413 MG/DL (ref 70–110)
POCT GLUCOSE: 419 MG/DL (ref 70–110)
POCT GLUCOSE: 472 MG/DL (ref 70–110)
POCT GLUCOSE: 478 MG/DL (ref 70–110)
POCT GLUCOSE: 491 MG/DL (ref 70–110)
POCT GLUCOSE: 92 MG/DL (ref 70–110)
POCT GLUCOSE: >500 MG/DL (ref 70–110)
POIKILOCYTOSIS BLD QL SMEAR: SLIGHT
POIKILOCYTOSIS BLD QL SMEAR: SLIGHT
POLYCHROMASIA BLD QL SMEAR: ABNORMAL
POTASSIUM BLD-SCNC: 5.6 MMOL/L (ref 3.5–5.1)
POTASSIUM SERPL-SCNC: 3 MMOL/L (ref 3.5–5.1)
POTASSIUM SERPL-SCNC: 3.1 MMOL/L (ref 3.5–5.1)
POTASSIUM SERPL-SCNC: 3.2 MMOL/L (ref 3.5–5.1)
POTASSIUM SERPL-SCNC: 3.3 MMOL/L (ref 3.5–5.1)
POTASSIUM SERPL-SCNC: 3.3 MMOL/L (ref 3.5–5.1)
POTASSIUM SERPL-SCNC: 3.4 MMOL/L (ref 3.5–5.1)
POTASSIUM SERPL-SCNC: 3.5 MMOL/L (ref 3.5–5.1)
POTASSIUM SERPL-SCNC: 3.5 MMOL/L (ref 3.5–5.1)
POTASSIUM SERPL-SCNC: 3.6 MMOL/L (ref 3.5–5.1)
POTASSIUM SERPL-SCNC: 3.6 MMOL/L (ref 3.5–5.1)
POTASSIUM SERPL-SCNC: 3.8 MMOL/L (ref 3.5–5.1)
POTASSIUM SERPL-SCNC: 3.9 MMOL/L (ref 3.5–5.1)
POTASSIUM SERPL-SCNC: 4.1 MMOL/L (ref 3.5–5.1)
POTASSIUM SERPL-SCNC: 4.1 MMOL/L (ref 3.5–5.1)
POTASSIUM SERPL-SCNC: 4.4 MMOL/L (ref 3.5–5.1)
POTASSIUM SERPL-SCNC: 5.9 MMOL/L (ref 3.5–5.1)
POTASSIUM SERPL-SCNC: 6.4 MMOL/L (ref 3.5–5.1)
PROT SERPL-MCNC: 4.8 G/DL (ref 6–8.4)
PROT SERPL-MCNC: 4.9 G/DL (ref 6–8.4)
PROT SERPL-MCNC: 4.9 G/DL (ref 6–8.4)
PROT SERPL-MCNC: 5.2 G/DL (ref 6–8.4)
PROT SERPL-MCNC: 5.4 G/DL (ref 6–8.4)
PROT SERPL-MCNC: 5.5 G/DL (ref 6–8.4)
PROT SERPL-MCNC: 5.6 G/DL (ref 6–8.4)
PROT SERPL-MCNC: 5.7 G/DL (ref 6–8.4)
PROT SERPL-MCNC: 5.8 G/DL (ref 6–8.4)
PROT SERPL-MCNC: 5.9 G/DL (ref 6–8.4)
PROT SERPL-MCNC: 6 G/DL (ref 6–8.4)
PROT SERPL-MCNC: 6.2 G/DL (ref 6–8.4)
PROT SERPL-MCNC: 6.2 G/DL (ref 6–8.4)
PROT SERPL-MCNC: 6.4 G/DL (ref 6–8.4)
PROT SERPL-MCNC: 6.5 G/DL (ref 6–8.4)
PROT SERPL-MCNC: 6.5 G/DL (ref 6–8.4)
PROT SERPL-MCNC: 6.6 G/DL (ref 6–8.4)
PROT SERPL-MCNC: 6.7 G/DL (ref 6–8.4)
PROT SERPL-MCNC: 7 G/DL (ref 6–8.4)
PROT SERPL-MCNC: 8.6 G/DL (ref 6–8.4)
PROT UR QL STRIP: NEGATIVE
PROTHROMBIN TIME: 11 SEC (ref 9–12.5)
RA MAJOR: 3.8 CM
RA WIDTH: 2.79 CM
RBC # BLD AUTO: 3.4 M/UL (ref 4–5.4)
RBC # BLD AUTO: 3.84 M/UL (ref 4–5.4)
RBC # BLD AUTO: 4.22 M/UL (ref 4–5.4)
RBC # BLD AUTO: 4.63 M/UL (ref 4–5.4)
RBC # BLD AUTO: 5.06 M/UL (ref 4–5.4)
RBC # BLD AUTO: 5.46 M/UL (ref 4–5.4)
RBC # BLD AUTO: 6.34 M/UL (ref 4–5.4)
RBC #/AREA URNS AUTO: 5 /HPF (ref 0–4)
RIGHT VENTRICULAR END-DIASTOLIC DIMENSION: 2.34 CM
SAMPLE: ABNORMAL
SCHISTOCYTES BLD QL SMEAR: ABNORMAL
SINUS: 1.96 CM
SITE: ABNORMAL
SODIUM BLD-SCNC: 154 MMOL/L (ref 136–145)
SODIUM SERPL-SCNC: 147 MMOL/L (ref 136–145)
SODIUM SERPL-SCNC: 154 MMOL/L (ref 136–145)
SODIUM SERPL-SCNC: 155 MMOL/L (ref 136–145)
SODIUM SERPL-SCNC: 156 MMOL/L (ref 136–145)
SODIUM SERPL-SCNC: 156 MMOL/L (ref 136–145)
SODIUM SERPL-SCNC: 157 MMOL/L (ref 136–145)
SODIUM SERPL-SCNC: 158 MMOL/L (ref 136–145)
SODIUM SERPL-SCNC: 159 MMOL/L (ref 136–145)
SODIUM SERPL-SCNC: 160 MMOL/L (ref 136–145)
SODIUM SERPL-SCNC: 161 MMOL/L (ref 136–145)
SODIUM SERPL-SCNC: 162 MMOL/L (ref 136–145)
SODIUM SERPL-SCNC: 162 MMOL/L (ref 136–145)
SODIUM SERPL-SCNC: 163 MMOL/L (ref 136–145)
SODIUM SERPL-SCNC: 164 MMOL/L (ref 136–145)
SODIUM SERPL-SCNC: 164 MMOL/L (ref 136–145)
SODIUM SERPL-SCNC: 166 MMOL/L (ref 136–145)
SODIUM SERPL-SCNC: 167 MMOL/L (ref 136–145)
SODIUM SERPL-SCNC: 168 MMOL/L (ref 136–145)
SODIUM SERPL-SCNC: 169 MMOL/L (ref 136–145)
SODIUM SERPL-SCNC: 170 MMOL/L (ref 136–145)
SODIUM SERPL-SCNC: 171 MMOL/L (ref 136–145)
SODIUM SERPL-SCNC: 171 MMOL/L (ref 136–145)
SODIUM SERPL-SCNC: 172 MMOL/L (ref 136–145)
SODIUM SERPL-SCNC: 173 MMOL/L (ref 136–145)
SODIUM SERPL-SCNC: 173 MMOL/L (ref 136–145)
SODIUM SERPL-SCNC: 174 MMOL/L (ref 136–145)
SP GR UR STRIP: >=1.03 (ref 1–1.03)
SP02: 100
SP02: 97
SP02: 99
SQUAMOUS #/AREA URNS AUTO: 2 /HPF
STJ: 2.39 CM
TDI LATERAL: 0.07
TDI SEPTAL: 0.07
TDI: 0.07
TR MAX PG: 24.8 MMHG
TRIGL SERPL-MCNC: 244 MG/DL (ref 30–150)
TSH SERPL DL<=0.005 MIU/L-ACNC: 2.69 UIU/ML (ref 0.4–4)
URN SPEC COLLECT METH UR: ABNORMAL
VT: 420
VT: 450
VT: 450
VT: 465
WBC # BLD AUTO: 17.32 K/UL (ref 3.9–12.7)
WBC # BLD AUTO: 17.82 K/UL (ref 3.9–12.7)
WBC # BLD AUTO: 18.1 K/UL (ref 3.9–12.7)
WBC # BLD AUTO: 19.01 K/UL (ref 3.9–12.7)
WBC # BLD AUTO: 19.87 K/UL (ref 3.9–12.7)
WBC # BLD AUTO: 20.39 K/UL (ref 3.9–12.7)
WBC # BLD AUTO: 21.4 K/UL (ref 3.9–12.7)
WBC #/AREA URNS AUTO: 11 /HPF (ref 0–5)
WBC CLUMPS UR QL AUTO: ABNORMAL
YEAST UR QL AUTO: ABNORMAL

## 2019-01-01 PROCEDURE — 84295 ASSAY OF SERUM SODIUM: CPT | Mod: 91

## 2019-01-01 PROCEDURE — 99291 CRITICAL CARE FIRST HOUR: CPT | Mod: 25,GC,, | Performed by: PSYCHIATRY & NEUROLOGY

## 2019-01-01 PROCEDURE — 80053 COMPREHEN METABOLIC PANEL: CPT

## 2019-01-01 PROCEDURE — 84100 ASSAY OF PHOSPHORUS: CPT

## 2019-01-01 PROCEDURE — 99900035 HC TECH TIME PER 15 MIN (STAT)

## 2019-01-01 PROCEDURE — 99291 PR CRITICAL CARE, E/M 30-74 MINUTES: ICD-10-PCS | Mod: 25,GC,, | Performed by: PSYCHIATRY & NEUROLOGY

## 2019-01-01 PROCEDURE — 96365 THER/PROPH/DIAG IV INF INIT: CPT

## 2019-01-01 PROCEDURE — 85025 COMPLETE CBC W/AUTO DIFF WBC: CPT

## 2019-01-01 PROCEDURE — 99292 CRITICAL CARE ADDL 30 MIN: CPT | Mod: 25

## 2019-01-01 PROCEDURE — 20000000 HC ICU ROOM

## 2019-01-01 PROCEDURE — 82565 ASSAY OF CREATININE: CPT

## 2019-01-01 PROCEDURE — 36600 WITHDRAWAL OF ARTERIAL BLOOD: CPT

## 2019-01-01 PROCEDURE — 83735 ASSAY OF MAGNESIUM: CPT

## 2019-01-01 PROCEDURE — 25000003 PHARM REV CODE 250: Performed by: STUDENT IN AN ORGANIZED HEALTH CARE EDUCATION/TRAINING PROGRAM

## 2019-01-01 PROCEDURE — 99291 PR CRITICAL CARE, E/M 30-74 MINUTES: ICD-10-PCS | Mod: GC,,, | Performed by: PSYCHIATRY & NEUROLOGY

## 2019-01-01 PROCEDURE — 94003 VENT MGMT INPAT SUBQ DAY: CPT

## 2019-01-01 PROCEDURE — 84443 ASSAY THYROID STIM HORMONE: CPT

## 2019-01-01 PROCEDURE — 25000003 PHARM REV CODE 250: Performed by: ANESTHESIOLOGY

## 2019-01-01 PROCEDURE — 63600175 PHARM REV CODE 636 W HCPCS: Performed by: NURSE PRACTITIONER

## 2019-01-01 PROCEDURE — 99233 SBSQ HOSP IP/OBS HIGH 50: CPT | Mod: GC,,, | Performed by: PSYCHIATRY & NEUROLOGY

## 2019-01-01 PROCEDURE — 99900026 HC AIRWAY MAINTENANCE (STAT)

## 2019-01-01 PROCEDURE — 80053 COMPREHEN METABOLIC PANEL: CPT | Mod: 91

## 2019-01-01 PROCEDURE — 96366 THER/PROPH/DIAG IV INF ADDON: CPT

## 2019-01-01 PROCEDURE — 25000003 PHARM REV CODE 250

## 2019-01-01 PROCEDURE — 99291 CRITICAL CARE FIRST HOUR: CPT | Mod: GC,,, | Performed by: PSYCHIATRY & NEUROLOGY

## 2019-01-01 PROCEDURE — 83605 ASSAY OF LACTIC ACID: CPT

## 2019-01-01 PROCEDURE — 36556 PR INSERT NON-TUNNEL CV CATH 5+ YRS OLD: ICD-10-PCS | Mod: GC,,, | Performed by: PSYCHIATRY & NEUROLOGY

## 2019-01-01 PROCEDURE — 80047 BASIC METABLC PNL IONIZED CA: CPT

## 2019-01-01 PROCEDURE — 99292 PR CRITICAL CARE, ADDL 30 MIN: ICD-10-PCS | Mod: 25,,, | Performed by: EMERGENCY MEDICINE

## 2019-01-01 PROCEDURE — 82803 BLOOD GASES ANY COMBINATION: CPT

## 2019-01-01 PROCEDURE — 99292 PR CRITICAL CARE, ADDL 30 MIN: ICD-10-PCS | Mod: GC,,, | Performed by: ANESTHESIOLOGY

## 2019-01-01 PROCEDURE — 99292 CRITICAL CARE ADDL 30 MIN: CPT | Mod: 25,,, | Performed by: EMERGENCY MEDICINE

## 2019-01-01 PROCEDURE — 99291 CRITICAL CARE FIRST HOUR: CPT | Mod: 25

## 2019-01-01 PROCEDURE — 25000003 PHARM REV CODE 250: Performed by: NURSE PRACTITIONER

## 2019-01-01 PROCEDURE — 99233 SBSQ HOSP IP/OBS HIGH 50: CPT | Mod: ,,, | Performed by: CLINICAL NURSE SPECIALIST

## 2019-01-01 PROCEDURE — 81001 URINALYSIS AUTO W/SCOPE: CPT

## 2019-01-01 PROCEDURE — 31500 INSERT EMERGENCY AIRWAY: CPT | Mod: ,,, | Performed by: EMERGENCY MEDICINE

## 2019-01-01 PROCEDURE — 82962 GLUCOSE BLOOD TEST: CPT

## 2019-01-01 PROCEDURE — 94761 N-INVAS EAR/PLS OXIMETRY MLT: CPT

## 2019-01-01 PROCEDURE — 27000221 HC OXYGEN, UP TO 24 HOURS

## 2019-01-01 PROCEDURE — 63600175 PHARM REV CODE 636 W HCPCS: Performed by: PSYCHIATRY & NEUROLOGY

## 2019-01-01 PROCEDURE — 27200966 HC CLOSED SUCTION SYSTEM

## 2019-01-01 PROCEDURE — 99233 PR SUBSEQUENT HOSPITAL CARE,LEVL III: ICD-10-PCS | Mod: GC,,, | Performed by: PSYCHIATRY & NEUROLOGY

## 2019-01-01 PROCEDURE — 99291 PR CRITICAL CARE, E/M 30-74 MINUTES: ICD-10-PCS | Mod: GC,,, | Performed by: ANESTHESIOLOGY

## 2019-01-01 PROCEDURE — 25000003 PHARM REV CODE 250: Performed by: PSYCHIATRY & NEUROLOGY

## 2019-01-01 PROCEDURE — 31500 PR INSERT, EMERGENCY ENDOTRACH AIRWAY: ICD-10-PCS | Mod: ,,, | Performed by: EMERGENCY MEDICINE

## 2019-01-01 PROCEDURE — 85610 PROTHROMBIN TIME: CPT

## 2019-01-01 PROCEDURE — 93010 ELECTROCARDIOGRAM REPORT: CPT | Mod: ,,, | Performed by: INTERNAL MEDICINE

## 2019-01-01 PROCEDURE — 83930 ASSAY OF BLOOD OSMOLALITY: CPT

## 2019-01-01 PROCEDURE — 83036 HEMOGLOBIN GLYCOSYLATED A1C: CPT

## 2019-01-01 PROCEDURE — 99291 CRITICAL CARE FIRST HOUR: CPT | Mod: 25,,, | Performed by: EMERGENCY MEDICINE

## 2019-01-01 PROCEDURE — 99223 PR INITIAL HOSPITAL CARE,LEVL III: ICD-10-PCS | Mod: GC,,, | Performed by: PSYCHIATRY & NEUROLOGY

## 2019-01-01 PROCEDURE — 99223 PR INITIAL HOSPITAL CARE,LEVL III: ICD-10-PCS | Mod: ,,, | Performed by: CLINICAL NURSE SPECIALIST

## 2019-01-01 PROCEDURE — 99222 PR INITIAL HOSPITAL CARE,LEVL II: ICD-10-PCS | Mod: GC,,, | Performed by: SURGERY

## 2019-01-01 PROCEDURE — 36620 INSERTION CATHETER ARTERY: CPT | Mod: ,,, | Performed by: PSYCHIATRY & NEUROLOGY

## 2019-01-01 PROCEDURE — 96367 TX/PROPH/DG ADDL SEQ IV INF: CPT

## 2019-01-01 PROCEDURE — 86850 RBC ANTIBODY SCREEN: CPT

## 2019-01-01 PROCEDURE — 99292 CRITICAL CARE ADDL 30 MIN: CPT | Mod: GC,,, | Performed by: ANESTHESIOLOGY

## 2019-01-01 PROCEDURE — 63600175 PHARM REV CODE 636 W HCPCS

## 2019-01-01 PROCEDURE — 94002 VENT MGMT INPAT INIT DAY: CPT

## 2019-01-01 PROCEDURE — 84295 ASSAY OF SERUM SODIUM: CPT

## 2019-01-01 PROCEDURE — 97110 THERAPEUTIC EXERCISES: CPT

## 2019-01-01 PROCEDURE — 99223 1ST HOSP IP/OBS HIGH 75: CPT | Mod: GC,,, | Performed by: PSYCHIATRY & NEUROLOGY

## 2019-01-01 PROCEDURE — 85049 AUTOMATED PLATELET COUNT: CPT

## 2019-01-01 PROCEDURE — 51702 INSERT TEMP BLADDER CATH: CPT

## 2019-01-01 PROCEDURE — 99233 SBSQ HOSP IP/OBS HIGH 50: CPT | Mod: ,,, | Performed by: PSYCHIATRY & NEUROLOGY

## 2019-01-01 PROCEDURE — 37799 UNLISTED PX VASCULAR SURGERY: CPT

## 2019-01-01 PROCEDURE — 99223 PR INITIAL HOSPITAL CARE,LEVL III: ICD-10-PCS | Mod: GC,,, | Performed by: NEUROLOGICAL SURGERY

## 2019-01-01 PROCEDURE — 99291 PR CRITICAL CARE, E/M 30-74 MINUTES: ICD-10-PCS | Mod: 25,,, | Performed by: EMERGENCY MEDICINE

## 2019-01-01 PROCEDURE — 99223 1ST HOSP IP/OBS HIGH 75: CPT | Mod: GC,,, | Performed by: NEUROLOGICAL SURGERY

## 2019-01-01 PROCEDURE — 87086 URINE CULTURE/COLONY COUNT: CPT

## 2019-01-01 PROCEDURE — 36556 INSERT NON-TUNNEL CV CATH: CPT | Mod: GC,,, | Performed by: PSYCHIATRY & NEUROLOGY

## 2019-01-01 PROCEDURE — 99291 CRITICAL CARE FIRST HOUR: CPT | Mod: GC,,, | Performed by: ANESTHESIOLOGY

## 2019-01-01 PROCEDURE — 99223 1ST HOSP IP/OBS HIGH 75: CPT | Mod: ,,, | Performed by: CLINICAL NURSE SPECIALIST

## 2019-01-01 PROCEDURE — 99233 PR SUBSEQUENT HOSPITAL CARE,LEVL III: ICD-10-PCS | Mod: ,,, | Performed by: PSYCHIATRY & NEUROLOGY

## 2019-01-01 PROCEDURE — 93005 ELECTROCARDIOGRAM TRACING: CPT

## 2019-01-01 PROCEDURE — 36556 INSERT NON-TUNNEL CV CATH: CPT

## 2019-01-01 PROCEDURE — 99233 PR SUBSEQUENT HOSPITAL CARE,LEVL III: ICD-10-PCS | Mod: ,,, | Performed by: CLINICAL NURSE SPECIALIST

## 2019-01-01 PROCEDURE — 96361 HYDRATE IV INFUSION ADD-ON: CPT

## 2019-01-01 PROCEDURE — 97166 OT EVAL MOD COMPLEX 45 MIN: CPT

## 2019-01-01 PROCEDURE — 80061 LIPID PANEL: CPT

## 2019-01-01 PROCEDURE — 25500020 PHARM REV CODE 255: Performed by: EMERGENCY MEDICINE

## 2019-01-01 PROCEDURE — 99222 1ST HOSP IP/OBS MODERATE 55: CPT | Mod: GC,,, | Performed by: SURGERY

## 2019-01-01 PROCEDURE — 93010 EKG 12-LEAD: ICD-10-PCS | Mod: ,,, | Performed by: INTERNAL MEDICINE

## 2019-01-01 PROCEDURE — 63600175 PHARM REV CODE 636 W HCPCS: Performed by: PHYSICIAN ASSISTANT

## 2019-01-01 PROCEDURE — 96372 THER/PROPH/DIAG INJ SC/IM: CPT | Mod: 59

## 2019-01-01 PROCEDURE — 25000003 PHARM REV CODE 250: Performed by: EMERGENCY MEDICINE

## 2019-01-01 PROCEDURE — 82010 KETONE BODYS QUAN: CPT

## 2019-01-01 PROCEDURE — 36620 ARTERIAL LINE: ICD-10-PCS | Mod: ,,, | Performed by: PSYCHIATRY & NEUROLOGY

## 2019-01-01 PROCEDURE — S5010 5% DEXTROSE AND 0.45% SALINE: HCPCS | Performed by: NURSE PRACTITIONER

## 2019-01-01 PROCEDURE — 31500 INSERT EMERGENCY AIRWAY: CPT

## 2019-01-01 RX ORDER — MANNITOL 20 G/100ML
50 INJECTION, SOLUTION INTRAVENOUS ONCE
Status: COMPLETED | OUTPATIENT
Start: 2019-01-01 | End: 2019-01-01

## 2019-01-01 RX ORDER — DEXTROSE MONOHYDRATE 50 MG/ML
INJECTION, SOLUTION INTRAVENOUS CONTINUOUS
Status: DISCONTINUED | OUTPATIENT
Start: 2019-01-01 | End: 2019-01-01 | Stop reason: HOSPADM

## 2019-01-01 RX ORDER — ATORVASTATIN CALCIUM 20 MG/1
40 TABLET, FILM COATED ORAL DAILY
Status: DISCONTINUED | OUTPATIENT
Start: 2019-01-01 | End: 2019-01-01

## 2019-01-01 RX ORDER — FAMOTIDINE 20 MG/1
20 TABLET, FILM COATED ORAL DAILY
Status: DISCONTINUED | OUTPATIENT
Start: 2019-01-01 | End: 2019-01-01 | Stop reason: HOSPADM

## 2019-01-01 RX ORDER — PROPOFOL 10 MG/ML
INJECTION, EMULSION INTRAVENOUS
Status: DISPENSED
Start: 2019-01-01 | End: 2019-01-01

## 2019-01-01 RX ORDER — PHENYLEPHRINE HCL IN 0.9% NACL 1 MG/10 ML
SYRINGE (ML) INTRAVENOUS
Status: COMPLETED
Start: 2019-01-01 | End: 2019-01-01

## 2019-01-01 RX ORDER — POTASSIUM CHLORIDE 20 MEQ/15ML
40 SOLUTION ORAL
Status: DISCONTINUED | OUTPATIENT
Start: 2019-01-01 | End: 2019-01-01

## 2019-01-01 RX ORDER — MORPHINE SULFATE 1 MG/ML
1 INJECTION, SOLUTION INTRAVENOUS CONTINUOUS
Status: DISCONTINUED | OUTPATIENT
Start: 2019-01-01 | End: 2019-01-01 | Stop reason: HOSPADM

## 2019-01-01 RX ORDER — SODIUM,POTASSIUM PHOSPHATES 280-250MG
2 POWDER IN PACKET (EA) ORAL
Status: DISCONTINUED | OUTPATIENT
Start: 2019-01-01 | End: 2019-01-01

## 2019-01-01 RX ORDER — ROCURONIUM BROMIDE 10 MG/ML
1 INJECTION, SOLUTION INTRAVENOUS ONCE
Status: COMPLETED | OUTPATIENT
Start: 2019-01-01 | End: 2019-01-01

## 2019-01-01 RX ORDER — POTASSIUM CHLORIDE 20 MEQ/15ML
40 SOLUTION ORAL
Status: COMPLETED | OUTPATIENT
Start: 2019-01-01 | End: 2019-01-01

## 2019-01-01 RX ORDER — LEVOTHYROXINE SODIUM 25 UG/1
25 TABLET ORAL
Status: DISCONTINUED | OUTPATIENT
Start: 2019-01-01 | End: 2019-01-01 | Stop reason: HOSPADM

## 2019-01-01 RX ORDER — FENTANYL CITRATE 50 UG/ML
INJECTION, SOLUTION INTRAMUSCULAR; INTRAVENOUS
Status: COMPLETED
Start: 2019-01-01 | End: 2019-01-01

## 2019-01-01 RX ORDER — SODIUM CHLORIDE 9 MG/ML
INJECTION, SOLUTION INTRAVENOUS CONTINUOUS
Status: DISCONTINUED | OUTPATIENT
Start: 2019-01-01 | End: 2019-01-01

## 2019-01-01 RX ORDER — NOREPINEPHRINE BITARTRATE 1 MG/ML
INJECTION, SOLUTION INTRAVENOUS
Status: DISPENSED
Start: 2019-01-01 | End: 2019-01-01

## 2019-01-01 RX ORDER — MANNITOL 20 G/100ML
INJECTION, SOLUTION INTRAVENOUS
Status: COMPLETED
Start: 2019-01-01 | End: 2019-01-01

## 2019-01-01 RX ORDER — PHENYLEPHRINE HCL IN 0.9% NACL 1 MG/10 ML
100 SYRINGE (ML) INTRAVENOUS ONCE
Status: COMPLETED | OUTPATIENT
Start: 2019-01-01 | End: 2019-01-01

## 2019-01-01 RX ORDER — LIDOCAINE HYDROCHLORIDE 10 MG/ML
5 INJECTION, SOLUTION EPIDURAL; INFILTRATION; INTRACAUDAL; PERINEURAL
Status: DISCONTINUED | OUTPATIENT
Start: 2019-01-01 | End: 2019-01-01

## 2019-01-01 RX ORDER — NICARDIPINE HYDROCHLORIDE 0.2 MG/ML
1 INJECTION INTRAVENOUS CONTINUOUS
Status: DISCONTINUED | OUTPATIENT
Start: 2019-01-01 | End: 2019-01-01

## 2019-01-01 RX ORDER — ETOMIDATE 2 MG/ML
INJECTION INTRAVENOUS
Status: DISPENSED
Start: 2019-01-01 | End: 2019-01-01

## 2019-01-01 RX ORDER — GLYCOPYRROLATE 0.2 MG/ML
0.1 INJECTION INTRAMUSCULAR; INTRAVENOUS
Status: DISCONTINUED | OUTPATIENT
Start: 2019-01-01 | End: 2019-01-01 | Stop reason: HOSPADM

## 2019-01-01 RX ORDER — SODIUM CHLORIDE, SODIUM LACTATE, POTASSIUM CHLORIDE, CALCIUM CHLORIDE 600; 310; 30; 20 MG/100ML; MG/100ML; MG/100ML; MG/100ML
INJECTION, SOLUTION INTRAVENOUS CONTINUOUS
Status: DISCONTINUED | OUTPATIENT
Start: 2019-01-01 | End: 2019-01-01

## 2019-01-01 RX ORDER — ROCURONIUM BROMIDE 10 MG/ML
INJECTION, SOLUTION INTRAVENOUS
Status: DISPENSED
Start: 2019-01-01 | End: 2019-01-01

## 2019-01-01 RX ORDER — LORAZEPAM 2 MG/ML
1 INJECTION INTRAMUSCULAR EVERY 30 MIN PRN
Status: DISCONTINUED | OUTPATIENT
Start: 2019-01-01 | End: 2019-01-01 | Stop reason: HOSPADM

## 2019-01-01 RX ORDER — LANOLIN ALCOHOL/MO/W.PET/CERES
800 CREAM (GRAM) TOPICAL
Status: DISCONTINUED | OUTPATIENT
Start: 2019-01-01 | End: 2019-01-01

## 2019-01-01 RX ORDER — HEPARIN SODIUM 5000 [USP'U]/ML
5000 INJECTION, SOLUTION INTRAVENOUS; SUBCUTANEOUS EVERY 8 HOURS
Status: DISCONTINUED | OUTPATIENT
Start: 2019-01-01 | End: 2019-01-01

## 2019-01-01 RX ORDER — DEXMEDETOMIDINE HYDROCHLORIDE 4 UG/ML
0.2 INJECTION, SOLUTION INTRAVENOUS CONTINUOUS
Status: DISCONTINUED | OUTPATIENT
Start: 2019-01-01 | End: 2019-01-01

## 2019-01-01 RX ORDER — SODIUM,POTASSIUM PHOSPHATES 280-250MG
2 POWDER IN PACKET (EA) ORAL EVERY 4 HOURS
Status: COMPLETED | OUTPATIENT
Start: 2019-01-01 | End: 2019-01-01

## 2019-01-01 RX ORDER — NOREPINEPHRINE BITARTRATE/D5W 4MG/250ML
0.02 PLASTIC BAG, INJECTION (ML) INTRAVENOUS CONTINUOUS
Status: DISCONTINUED | OUTPATIENT
Start: 2019-01-01 | End: 2019-01-01 | Stop reason: HOSPADM

## 2019-01-01 RX ORDER — DEXTROSE MONOHYDRATE AND SODIUM CHLORIDE 5; .45 G/100ML; G/100ML
INJECTION, SOLUTION INTRAVENOUS CONTINUOUS
Status: DISCONTINUED | OUTPATIENT
Start: 2019-01-01 | End: 2019-01-01

## 2019-01-01 RX ORDER — ATROPINE SULFATE 0.1 MG/ML
INJECTION INTRAVENOUS
Status: COMPLETED
Start: 2019-01-01 | End: 2019-01-01

## 2019-01-01 RX ORDER — FENTANYL CITRATE 50 UG/ML
12.5 INJECTION, SOLUTION INTRAMUSCULAR; INTRAVENOUS ONCE
Status: DISCONTINUED | OUTPATIENT
Start: 2019-01-01 | End: 2019-01-01

## 2019-01-01 RX ORDER — MANNITOL 250 MG/ML
25 INJECTION, SOLUTION INTRAVENOUS
Status: DISCONTINUED | OUTPATIENT
Start: 2019-01-01 | End: 2019-01-01

## 2019-01-01 RX ORDER — CHLORHEXIDINE GLUCONATE ORAL RINSE 1.2 MG/ML
15 SOLUTION DENTAL 2 TIMES DAILY
Status: DISCONTINUED | OUTPATIENT
Start: 2019-01-01 | End: 2019-01-01 | Stop reason: HOSPADM

## 2019-01-01 RX ORDER — MIDAZOLAM HYDROCHLORIDE 1 MG/ML
INJECTION INTRAMUSCULAR; INTRAVENOUS
Status: DISCONTINUED
Start: 2019-01-01 | End: 2019-01-01 | Stop reason: WASHOUT

## 2019-01-01 RX ORDER — SODIUM CHLORIDE 0.9 % (FLUSH) 0.9 %
10 SYRINGE (ML) INJECTION
Status: DISCONTINUED | OUTPATIENT
Start: 2019-01-01 | End: 2019-01-01 | Stop reason: HOSPADM

## 2019-01-01 RX ORDER — ONDANSETRON 8 MG/1
8 TABLET, ORALLY DISINTEGRATING ORAL EVERY 8 HOURS PRN
Status: DISCONTINUED | OUTPATIENT
Start: 2019-01-01 | End: 2019-01-01

## 2019-01-01 RX ORDER — MANNITOL 20 G/100ML
65 INJECTION, SOLUTION INTRAVENOUS ONCE
Status: COMPLETED | OUTPATIENT
Start: 2019-01-01 | End: 2019-01-01

## 2019-01-01 RX ORDER — ETOMIDATE 2 MG/ML
20 INJECTION INTRAVENOUS
Status: COMPLETED | OUTPATIENT
Start: 2019-01-01 | End: 2019-01-01

## 2019-01-01 RX ORDER — NAPROXEN SODIUM 220 MG/1
81 TABLET, FILM COATED ORAL DAILY
Status: DISCONTINUED | OUTPATIENT
Start: 2019-01-01 | End: 2019-01-01

## 2019-01-01 RX ORDER — MANNITOL 250 MG/ML
25 INJECTION, SOLUTION INTRAVENOUS EVERY 8 HOURS
Status: DISCONTINUED | OUTPATIENT
Start: 2019-01-01 | End: 2019-01-01

## 2019-01-01 RX ORDER — PHENYLEPHRINE HCL IN 0.9% NACL 1 MG/10 ML
SYRINGE (ML) INTRAVENOUS
Status: DISPENSED
Start: 2019-01-01 | End: 2019-01-01

## 2019-01-01 RX ORDER — POTASSIUM CHLORIDE 14.9 MG/ML
20 INJECTION INTRAVENOUS ONCE
Status: COMPLETED | OUTPATIENT
Start: 2019-01-01 | End: 2019-01-01

## 2019-01-01 RX ORDER — NICARDIPINE HYDROCHLORIDE 0.2 MG/ML
2.5 INJECTION INTRAVENOUS CONTINUOUS
Status: DISCONTINUED | OUTPATIENT
Start: 2019-01-01 | End: 2019-01-01

## 2019-01-01 RX ORDER — FENTANYL CITRATE 50 UG/ML
50 INJECTION, SOLUTION INTRAMUSCULAR; INTRAVENOUS ONCE
Status: COMPLETED | OUTPATIENT
Start: 2019-01-01 | End: 2019-01-01

## 2019-01-01 RX ORDER — DEXMEDETOMIDINE HYDROCHLORIDE 4 UG/ML
INJECTION, SOLUTION INTRAVENOUS
Status: DISPENSED
Start: 2019-01-01 | End: 2019-01-01

## 2019-01-01 RX ORDER — AMOXICILLIN 250 MG
1 CAPSULE ORAL DAILY
Status: DISCONTINUED | OUTPATIENT
Start: 2019-01-01 | End: 2019-01-01 | Stop reason: HOSPADM

## 2019-01-01 RX ORDER — ATORVASTATIN CALCIUM 20 MG/1
20 TABLET, FILM COATED ORAL DAILY
COMMUNITY

## 2019-01-01 RX ORDER — NICARDIPINE HYDROCHLORIDE 0.2 MG/ML
INJECTION INTRAVENOUS
Status: DISPENSED
Start: 2019-01-01 | End: 2019-01-01

## 2019-01-01 RX ORDER — SODIUM CHLORIDE 9 MG/ML
1000 INJECTION, SOLUTION INTRAVENOUS
Status: COMPLETED | OUTPATIENT
Start: 2019-01-01 | End: 2019-01-01

## 2019-01-01 RX ORDER — METFORMIN HYDROCHLORIDE 500 MG/1
500 TABLET ORAL 2 TIMES DAILY WITH MEALS
COMMUNITY

## 2019-01-01 RX ORDER — POLYETHYLENE GLYCOL 3350 17 G/17G
17 POWDER, FOR SOLUTION ORAL DAILY
Status: DISCONTINUED | OUTPATIENT
Start: 2019-01-01 | End: 2019-01-01 | Stop reason: HOSPADM

## 2019-01-01 RX ORDER — NICARDIPINE HYDROCHLORIDE 0.2 MG/ML
INJECTION INTRAVENOUS
Status: COMPLETED
Start: 2019-01-01 | End: 2019-01-01

## 2019-01-01 RX ORDER — MANNITOL 250 MG/ML
62.5 INJECTION, SOLUTION INTRAVENOUS ONCE
Status: DISCONTINUED | OUTPATIENT
Start: 2019-01-01 | End: 2019-01-01

## 2019-01-01 RX ORDER — ATROPINE SULFATE 1 MG/ML
0.5 INJECTION, SOLUTION INTRAMUSCULAR; INTRAVENOUS; SUBCUTANEOUS ONCE
Status: COMPLETED | OUTPATIENT
Start: 2019-01-01 | End: 2019-01-01

## 2019-01-01 RX ORDER — LEVOTHYROXINE SODIUM 25 UG/1
25 TABLET ORAL DAILY
COMMUNITY

## 2019-01-01 RX ORDER — NOREPINEPHRINE BITARTRATE/D5W 4MG/250ML
0.02 PLASTIC BAG, INJECTION (ML) INTRAVENOUS CONTINUOUS
Status: DISCONTINUED | OUTPATIENT
Start: 2019-01-01 | End: 2019-01-01

## 2019-01-01 RX ADMIN — LEVOTHYROXINE SODIUM 25 MCG: 25 TABLET ORAL at 10:04

## 2019-01-01 RX ADMIN — ATROPINE SULFATE 0.5 MG: 1 INJECTION, SOLUTION INTRAMUSCULAR; INTRAVENOUS; SUBCUTANEOUS at 06:04

## 2019-01-01 RX ADMIN — ATORVASTATIN CALCIUM 40 MG: 20 TABLET, FILM COATED ORAL at 08:04

## 2019-01-01 RX ADMIN — SODIUM CHLORIDE: 0.9 INJECTION, SOLUTION INTRAVENOUS at 11:04

## 2019-01-01 RX ADMIN — LEVOTHYROXINE SODIUM 25 MCG: 25 TABLET ORAL at 05:04

## 2019-01-01 RX ADMIN — Medication 0.02 MCG/KG/MIN: at 05:04

## 2019-01-01 RX ADMIN — NITROGLYCERIN 0.5 INCH: 20 OINTMENT TOPICAL at 10:04

## 2019-01-01 RX ADMIN — NITROGLYCERIN 0.5 INCH: 20 OINTMENT TOPICAL at 06:04

## 2019-01-01 RX ADMIN — NICARDIPINE HYDROCHLORIDE 5 MG/HR: 0.2 INJECTION, SOLUTION INTRAVENOUS at 04:04

## 2019-01-01 RX ADMIN — STANDARDIZED SENNA CONCENTRATE AND DOCUSATE SODIUM 1 TABLET: 8.6; 5 TABLET, FILM COATED ORAL at 11:04

## 2019-01-01 RX ADMIN — ATORVASTATIN CALCIUM 40 MG: 10 TABLET, FILM COATED ORAL at 11:04

## 2019-01-01 RX ADMIN — FAMOTIDINE 20 MG: 20 TABLET, FILM COATED ORAL at 08:04

## 2019-01-01 RX ADMIN — MANNITOL 50 G: 20 INJECTION, SOLUTION INTRAVENOUS at 12:04

## 2019-01-01 RX ADMIN — MANNITOL 25 G: 12.5 INJECTION, SOLUTION INTRAVENOUS at 01:04

## 2019-01-01 RX ADMIN — Medication 0.6 MCG/KG/MIN: at 10:04

## 2019-01-01 RX ADMIN — LEVOTHYROXINE SODIUM 25 MCG: 25 TABLET ORAL at 06:04

## 2019-01-01 RX ADMIN — NICARDIPINE HYDROCHLORIDE 2.5 MG/HR: 0.2 INJECTION, SOLUTION INTRAVENOUS at 04:04

## 2019-01-01 RX ADMIN — SODIUM CHLORIDE, SODIUM LACTATE, POTASSIUM CHLORIDE, AND CALCIUM CHLORIDE: .6; .31; .03; .02 INJECTION, SOLUTION INTRAVENOUS at 11:04

## 2019-01-01 RX ADMIN — MANNITOL 65 G: 20 INJECTION, SOLUTION INTRAVENOUS at 07:04

## 2019-01-01 RX ADMIN — POTASSIUM CHLORIDE 40 MEQ: 20 SOLUTION ORAL at 04:04

## 2019-01-01 RX ADMIN — Medication 100 MCG: at 06:04

## 2019-01-01 RX ADMIN — SODIUM CHLORIDE: 0.9 INJECTION, SOLUTION INTRAVENOUS at 10:04

## 2019-01-01 RX ADMIN — CHLORHEXIDINE GLUCONATE 0.12% ORAL RINSE 15 ML: 1.2 LIQUID ORAL at 09:04

## 2019-01-01 RX ADMIN — LORAZEPAM 1 MG: 2 INJECTION INTRAMUSCULAR; INTRAVENOUS at 04:04

## 2019-01-01 RX ADMIN — SODIUM CHLORIDE 1000 ML: 0.9 INJECTION, SOLUTION INTRAVENOUS at 09:04

## 2019-01-01 RX ADMIN — HEPARIN SODIUM 5000 UNITS: 5000 INJECTION, SOLUTION INTRAVENOUS; SUBCUTANEOUS at 09:04

## 2019-01-01 RX ADMIN — NITROGLYCERIN 0.5 INCH: 20 OINTMENT TOPICAL at 05:04

## 2019-01-01 RX ADMIN — SODIUM CHLORIDE 4.34 UNITS/HR: 9 INJECTION, SOLUTION INTRAVENOUS at 08:04

## 2019-01-01 RX ADMIN — NITROGLYCERIN 0.5 INCH: 20 OINTMENT TOPICAL at 12:04

## 2019-01-01 RX ADMIN — NICARDIPINE HYDROCHLORIDE 2.5 MG/HR: 0.2 INJECTION, SOLUTION INTRAVENOUS at 11:04

## 2019-01-01 RX ADMIN — POTASSIUM CHLORIDE 40 MEQ: 20 SOLUTION ORAL at 06:04

## 2019-01-01 RX ADMIN — CHLORHEXIDINE GLUCONATE 0.12% ORAL RINSE 15 ML: 1.2 LIQUID ORAL at 08:04

## 2019-01-01 RX ADMIN — DEXMEDETOMIDINE HYDROCHLORIDE 0.2 MCG/KG/HR: 4 INJECTION, SOLUTION INTRAVENOUS at 10:04

## 2019-01-01 RX ADMIN — FAMOTIDINE 20 MG: 20 TABLET, FILM COATED ORAL at 02:04

## 2019-01-01 RX ADMIN — SODIUM CHLORIDE 1000 ML: 0.9 INJECTION, SOLUTION INTRAVENOUS at 10:04

## 2019-01-01 RX ADMIN — NITROGLYCERIN 0.5 INCH: 20 OINTMENT TOPICAL at 11:04

## 2019-01-01 RX ADMIN — Medication 0.42 MCG/KG/MIN: at 04:04

## 2019-01-01 RX ADMIN — SODIUM CHLORIDE 0.9 UNITS/HR: 9 INJECTION, SOLUTION INTRAVENOUS at 10:04

## 2019-01-01 RX ADMIN — Medication 0.2 MCG/KG/MIN: at 01:04

## 2019-01-01 RX ADMIN — DEXTROSE: 5 SOLUTION INTRAVENOUS at 10:04

## 2019-01-01 RX ADMIN — POTASSIUM & SODIUM PHOSPHATES POWDER PACK 280-160-250 MG 2 PACKET: 280-160-250 PACK at 02:04

## 2019-01-01 RX ADMIN — Medication 0.3 MCG/KG/MIN: at 09:04

## 2019-01-01 RX ADMIN — ATORVASTATIN CALCIUM 40 MG: 20 TABLET, FILM COATED ORAL at 09:04

## 2019-01-01 RX ADMIN — FENTANYL CITRATE 50 MCG: 50 INJECTION, SOLUTION INTRAMUSCULAR; INTRAVENOUS at 05:04

## 2019-01-01 RX ADMIN — MANNITOL 25 G: 12.5 INJECTION, SOLUTION INTRAVENOUS at 09:04

## 2019-01-01 RX ADMIN — POTASSIUM & SODIUM PHOSPHATES POWDER PACK 280-160-250 MG 2 PACKET: 280-160-250 PACK at 06:04

## 2019-01-01 RX ADMIN — POLYETHYLENE GLYCOL 3350 17 G: 17 POWDER, FOR SOLUTION ORAL at 11:04

## 2019-01-01 RX ADMIN — FENTANYL CITRATE 50 MCG: 50 INJECTION INTRAMUSCULAR; INTRAVENOUS at 05:04

## 2019-01-01 RX ADMIN — CHLORHEXIDINE GLUCONATE 0.12% ORAL RINSE 15 ML: 1.2 LIQUID ORAL at 10:04

## 2019-01-01 RX ADMIN — IOHEXOL 75 ML: 350 INJECTION, SOLUTION INTRAVENOUS at 09:04

## 2019-01-01 RX ADMIN — Medication 0.6 MCG/KG/MIN: at 12:04

## 2019-01-01 RX ADMIN — HEPARIN SODIUM 5000 UNITS: 5000 INJECTION, SOLUTION INTRAVENOUS; SUBCUTANEOUS at 03:04

## 2019-01-01 RX ADMIN — FAMOTIDINE 20 MG: 20 TABLET, FILM COATED ORAL at 09:04

## 2019-01-01 RX ADMIN — ROCURONIUM BROMIDE 73 MG: 10 INJECTION, SOLUTION INTRAVENOUS at 10:04

## 2019-01-01 RX ADMIN — ASPIRIN 81 MG CHEWABLE TABLET 81 MG: 81 TABLET CHEWABLE at 11:04

## 2019-01-01 RX ADMIN — ETOMIDATE 20 MG: 2 INJECTION INTRAVENOUS at 10:04

## 2019-01-01 RX ADMIN — SODIUM CHLORIDE, SODIUM LACTATE, POTASSIUM CHLORIDE, AND CALCIUM CHLORIDE: .6; .31; .03; .02 INJECTION, SOLUTION INTRAVENOUS at 06:04

## 2019-01-01 RX ADMIN — SODIUM CHLORIDE 3 UNITS/HR: 9 INJECTION, SOLUTION INTRAVENOUS at 12:04

## 2019-01-01 RX ADMIN — SODIUM CHLORIDE 2.25 UNITS/HR: 9 INJECTION, SOLUTION INTRAVENOUS at 02:04

## 2019-01-01 RX ADMIN — DEXTROSE AND SODIUM CHLORIDE: 5; .45 INJECTION, SOLUTION INTRAVENOUS at 02:04

## 2019-01-01 RX ADMIN — NICARDIPINE HYDROCHLORIDE 2.5 MG/HR: 0.2 INJECTION INTRAVENOUS at 04:04

## 2019-01-01 RX ADMIN — POLYETHYLENE GLYCOL 3350 17 G: 17 POWDER, FOR SOLUTION ORAL at 08:04

## 2019-01-01 RX ADMIN — SODIUM CHLORIDE 500 ML: 0.9 INJECTION, SOLUTION INTRAVENOUS at 11:04

## 2019-01-01 RX ADMIN — SODIUM CHLORIDE, SODIUM LACTATE, POTASSIUM CHLORIDE, AND CALCIUM CHLORIDE 1000 ML: .6; .31; .03; .02 INJECTION, SOLUTION INTRAVENOUS at 08:04

## 2019-01-01 RX ADMIN — STANDARDIZED SENNA CONCENTRATE AND DOCUSATE SODIUM 1 TABLET: 8.6; 5 TABLET, FILM COATED ORAL at 08:04

## 2019-01-01 RX ADMIN — ATROPINE SULFATE 0.5 MG: 0.1 INJECTION PARENTERAL at 06:04

## 2019-01-01 RX ADMIN — POTASSIUM CHLORIDE 20 MEQ: 14.9 INJECTION, SOLUTION INTRAVENOUS at 11:04

## 2019-01-01 RX ADMIN — Medication 0.6 MCG/KG/MIN: at 08:04

## 2019-01-01 RX ADMIN — MORPHINE SULFATE 5 MG/HR: 1 INJECTION, SOLUTION INTRAVENOUS at 04:04

## 2019-01-01 RX ADMIN — SODIUM CHLORIDE, SODIUM LACTATE, POTASSIUM CHLORIDE, AND CALCIUM CHLORIDE: .6; .31; .03; .02 INJECTION, SOLUTION INTRAVENOUS at 03:04

## 2019-01-01 RX ADMIN — GLYCOPYRROLATE 0.1 MG: 0.2 INJECTION INTRAMUSCULAR; INTRAVENOUS at 04:04

## 2019-01-01 RX ADMIN — SODIUM CHLORIDE 500 ML: 0.9 INJECTION, SOLUTION INTRAVENOUS at 06:04

## 2019-04-07 PROBLEM — E03.8 OTHER SPECIFIED HYPOTHYROIDISM: Status: ACTIVE | Noted: 2019-01-01

## 2019-04-07 PROBLEM — G93.6 CYTOTOXIC CEREBRAL EDEMA: Status: ACTIVE | Noted: 2019-01-01

## 2019-04-07 PROBLEM — I63.9 STROKE: Status: ACTIVE | Noted: 2019-01-01

## 2019-04-07 PROBLEM — I63.411 EMBOLIC STROKE INVOLVING RIGHT MIDDLE CEREBRAL ARTERY: Status: ACTIVE | Noted: 2019-01-01

## 2019-04-07 PROBLEM — Z71.89 GOALS OF CARE, COUNSELING/DISCUSSION: Status: ACTIVE | Noted: 2019-01-01

## 2019-04-07 PROBLEM — N18.5 STAGE 5 CHRONIC KIDNEY DISEASE NOT ON CHRONIC DIALYSIS: Status: ACTIVE | Noted: 2019-01-01

## 2019-04-07 NOTE — HPI
Patient is a 78 year old female with medical history of severe dementia, HTN, HLD who presented to the ED with left sided weakness and right gaze preference.  Patient was last seen normal by  last night at 10:00pm.   noticed when she woke up this morning at 7:30am she was not moving left side.  Patient's blood glucose was greater than 600.  Patient taken to CT scan and right m1 occlusion seen.  Not interventional candidate due to significant ischemic changes.

## 2019-04-07 NOTE — ASSESSMENT & PLAN NOTE
Patient with multiples risk factors like HTN, HLD, DM TII. Non-compliant with medications  Patient last seen normal on 4/6/19 @ 10:00 pm, out of tPA window. No thrombectomy candidate due to CTH showed ischemic changes  CTA stroke multiphase showed: Large R MCA distribution acute infarct. Occluded R MCA at its origin. Short segment of approximately 70-80% stenosis involving the distal common carotid artery. 50% stenosis at the origin of the right ICA. Focal area of suspected high-grade stenosis at the distal right PICA.  Vascular stroke following, appreciated recs  Admitted to NICU with Neuro Q1 check  Normoglycemia and normothermia is preferred  NPO  BP goal <220/105 mmhg. Labetalol and Cardene PRN  ASA 81 mg, Plavix 75 mg and Atorvastatin 40 mg 1 tab PO daily  MRI brain w/o contrast ordered  TTE ordered  ST/PT/OT consulted  Lipid panel with .2 (Goal in diabetic patient <70), A1c: 11.4, TSH 2.69

## 2019-04-07 NOTE — ED PROVIDER NOTES
Encounter Date: 4/7/2019    SCRIBE #1 NOTE: I, Yeni Barr, am scribing for, and in the presence of,  Dr. Bauman. I have scribed the entire note.       History     Chief Complaint   Patient presents with    Extremity Weakness     found this AM by family with left side weakness and right side gaze, LSN 1000pm yesterday, VAN +     Time patient was seen by the provider: 9:40 AM      The patient is a 78 y.o. female with co-morbidities including: NIDDM (on metformin), HTN, HLD, and dementia (walks and talks at baseline but is confused and cannot bathe herself, diagnosed 1.5 years ago), who presents to the ED with a complaint of extremity weakness. Her left side is completely flaccid and she has a right arm twitch. She withdraws to pain.     Her  reports that he found her unresponsive around 7:30 AM this morning (although he is not exactly sure that he correctly remembers the time). She had urinated on herself during the night. EMS arrived shortly after 9 AM.     She was last seen normal around 10 PM last night, when her  helped her go to bed downstairs. He slept in a separate bed upstairs. She was at her baseline last night and ate dinner. No headache, no chest pain, no shortness of breath, no nausea, no vomiting, no fever, and no chills last night. She fell last week while she was home alone and her  was at work, and her  thinks that she may have hit her head. She has been constipated this week, but her  reports no other symptoms in the past week.    She and her  live alone. She refused home health care and has not seen a doctor since her dementia diagnosis. She does not measure her BP at home. She does not smoke or drink alcohol. She had another fall 3 years ago. She has a niece who lives nearby and her  has family nearby. Her  reports no PMHx of stroke, CAD, or recent cancer, no recent surgeries, and no FHx of stroke.    The history is provided by the EMS  personnel and the spouse. The history is limited by the condition of the patient.     Review of patient's allergies indicates:  No Known Allergies  Past Medical History:   Diagnosis Date    Breast cancer     Diabetes mellitus     Hyperlipidemia     Hypertension     Mild non proliferative diabetic retinopathy 10/13/2014    Nuclear sclerosis 10/13/2014    Open angle with borderline findings and low glaucoma risk in both eyes 10/13/2014    Osteoarthritis of right hip 2015     Past Surgical History:   Procedure Laterality Date    BREAST LUMPECTOMY       Family History   Problem Relation Age of Onset    Cancer Maternal Aunt     No Known Problems Mother     No Known Problems Father     No Known Problems Sister     No Known Problems Brother     No Known Problems Maternal Uncle     No Known Problems Paternal Aunt     No Known Problems Paternal Uncle     No Known Problems Maternal Grandmother     No Known Problems Maternal Grandfather     No Known Problems Paternal Grandmother     No Known Problems Paternal Grandfather     Amblyopia Neg Hx     Blindness Neg Hx     Cataracts Neg Hx     Diabetes Neg Hx     Glaucoma Neg Hx     Hypertension Neg Hx     Macular degeneration Neg Hx     Retinal detachment Neg Hx     Strabismus Neg Hx     Stroke Neg Hx     Thyroid disease Neg Hx      Social History     Tobacco Use    Smoking status: Former Smoker     Types: Cigarettes     Last attempt to quit: 1962     Years since quittin.7    Smokeless tobacco: Never Used   Substance Use Topics    Alcohol use: Yes     Alcohol/week: 0.0 oz     Comment: former heavy use of wine? difficult to assess    Drug use: No     Review of Systems   Unable to perform ROS: Mental status change       Physical Exam     Initial Vitals [19 0940]   BP Pulse Resp Temp SpO2   (!) 154/68 104 17 97.7 °F (36.5 °C) 97 %      MAP       --         Physical Exam    Nursing note and vitals reviewed.        ED  Course   Intubation  Date/Time: 4/7/2019 10:26 AM  Location procedure was performed: [facility] EMERGENCY DEPARTMENT  Performed by: Trevor Bauman DO  Authorized by: Trevor Bauman DO   Consent Done: Emergent Situation  Indications: airway protection  Intubation method: video-assisted  Patient status: paralyzed (RSI)  Preoxygenation: mask and nasal cannula  Sedatives: etomidate  Paralytic: rocuronium  Laryngoscope size: Mac 3  Tube size: 7.5 mm  Tube type: cuffed  Number of attempts: 1  Cords visualized: yes  Post-procedure assessment: chest rise,  ETCO2 monitor and CO2 detector  Breath sounds: clear and equal  Cuff inflated: yes  ETT to lip: 23 cm  Tube secured with: ETT arevalo  Chest x-ray interpreted by me.  Chest x-ray findings: endotracheal tube in appropriate position  Patient tolerance: Patient tolerated the procedure well with no immediate complications  Complications: No    Critical Care  Date/Time: 4/7/2019 10:31 AM  Performed by: Trevor Bauman DO  Authorized by: Trevor Bauman DO   Direct patient critical care time: 15 minutes  Additional history critical care time: 15 minutes  Ordering / reviewing critical care time: 15 minutes  Documentation critical care time: 7 minutes  Consulting other physicians critical care time: 10 minutes  Consult with family critical care time: 15 minutes  Total critical care time (exclusive of procedural time) : 77 minutes  Critical care was necessary to treat or prevent imminent or life-threatening deterioration of the following conditions: CNS failure or compromise and renal failure (Acute stroke, MCA occlusion, intubated for airway protection).  Critical care was time spent personally by me on the following activities: discussions with consultants, development of treatment plan with patient or surrogate, evaluation of patient's response to treatment, examination of patient, obtaining history from patient or surrogate, ordering and performing treatments and interventions,  ordering and review of laboratory studies, ordering and review of radiographic studies, pulse oximetry, re-evaluation of patient's condition, review of old charts and ventilator management.        Labs Reviewed   CBC W/ AUTO DIFFERENTIAL - Abnormal; Notable for the following components:       Result Value    WBC 17.82 (*)     RBC 6.34 (*)     Hematocrit 51.6 (*)     MCV 81 (*)     MCH 23.0 (*)     MCHC 28.3 (*)     Gran # (ANC) 15.6 (*)     Immature Grans (Abs) 0.09 (*)     Gran% 87.7 (*)     Lymph% 7.7 (*)     Mono% 3.8 (*)     All other components within normal limits   COMPREHENSIVE METABOLIC PANEL - Abnormal; Notable for the following components:    Sodium 154 (*)     Potassium 5.9 (*)     CO2 15 (*)     Glucose 899 (*)     BUN, Bld 96 (*)     Creatinine 3.9 (*)     Calcium 10.6 (*)     Total Protein 8.6 (*)     Anion Gap 29 (*)     eGFR if  12.2 (*)     eGFR if non  10.6 (*)     All other components within normal limits   LIPID PANEL - Abnormal; Notable for the following components:    Cholesterol 290 (*)     Triglycerides 244 (*)     LDL Cholesterol 192.2 (*)     HDL/Chol Ratio 16.9 (*)     Total Cholesterol/HDL Ratio 5.9 (*)     All other components within normal limits   BETA - HYDROXYBUTYRATE, SERUM - Abnormal; Notable for the following components:    Beta-Hydroxybutyrate 6.4 (*)     All other components within normal limits   LACTIC ACID, PLASMA - Abnormal; Notable for the following components:    Lactate (Lactic Acid) 3.7 (*)     All other components within normal limits   HEMOGLOBIN A1C - Abnormal; Notable for the following components:    Hemoglobin A1C 11.4 (*)     Estimated Avg Glucose 280 (*)     All other components within normal limits    Narrative:     ADD ON [facility] PER: NGOC YOUNG DO  04/07/2019  12:35    ISTAT PROCEDURE - Abnormal; Notable for the following components:    POC PTWBT 16.0 (*)     POC PTINR 1.4 (*)     All other components within normal limits    ISTAT CREATININE - Abnormal; Notable for the following components:    POC Creatinine 3.0 (*)     All other components within normal limits   ISTAT PROCEDURE - Abnormal; Notable for the following components:    POC PH 7.298 (*)     POC PCO2 29.1 (*)     POC PO2 217 (*)     POC HCO3 14.3 (*)     POC TCO2 15 (*)     All other components within normal limits   PROTIME-INR   TSH   HEMOGLOBIN A1C   POTASSIUM   COMPREHENSIVE METABOLIC PANEL   POCT GLUCOSE, HAND-HELD DEVICE   TYPE & SCREEN   ISTAT CHEM8   POCT GLUCOSE MONITORING CONTINUOUS   POCT GLUCOSE MONITORING CONTINUOUS     EKG Readings: (Independently Interpreted)   Initial Reading: No STEMI. Rhythm: Sinus Tachycardia. Heart Rate: 107. Axis: Normal.   Normal intervals.      ECG Results          ECG 12 lead (In process)  Result time 04/07/19 10:43:15    In process by Interface, Lab In [facility] (04/07/19 10:43:15)                 Narrative:    Test Reason : I63.9,    Vent. Rate : 107 BPM     Atrial Rate : 107 BPM     P-R Int : 112 ms          QRS Dur : 072 ms      QT Int : 378 ms       P-R-T Axes : 058 023 023 degrees     QTc Int : 504 ms    Sinus tachycardia  Otherwise normal ECG  When compared with ECG of 20-OCT-2014 06:02,  No significant change was found    Referred By: [email or web address]   SELF           Confirmed By:                             Imaging Results          X-Ray Chest AP Portable (Final result)  Result time 04/07/19 11:00:12    Final result by Bud Quezada MD (04/07/19 11:00:12)                 Impression:      1. ET tube with tip projected over the tracheal stripe 3 cm from the shashank.  2. Enteric catheter with tip and side port projected over the stomach.      Electronically signed by: Bud Quezada MD  Date:    04/07/2019  Time:    11:00             Narrative:    EXAMINATION:  XR CHEST AP PORTABLE    CLINICAL HISTORY:  Stroke;    TECHNIQUE:  Single frontal view of the chest was performed.    COMPARISON:  Radiograph 01/04/2007    FINDINGS:  ETT  projects over the tracheal stripe with tip just below the level of the clavicular heads approximately 3 cm from the shashank.  Enteric catheter crosses the diaphragm with tip and side port projected over the stomach.  Mediastinal structures are midline.  Hilar contours are unremarkable.  Cardiac silhouette is normal in size.  Lung volumes are symmetric.  No consolidation.  No pneumothorax or pleural effusions.  No free air beneath the diaphragm.  Surgical clips project over the left lateral chest wall.  No acute osseous abnormalities.  Degenerative changes of the spine and glenohumeral joints noted.                               CTA STROKE MULTI-PHASE (Final result)  Result time 04/07/19 10:28:30   Procedure changed from CT Head Without Contrast     Final result by Bud Quezada MD (04/07/19 10:28:30)                 Impression:      1. Large right MCA distribution acute infarct.  No intracranial hemorrhage.  2. Occluded right MCA at its origin.  3. Short segment of approximately 70-80% stenosis involving the distal common carotid artery.  4. 50% stenosis at the origin of the right ICA.  5. Focal area of suspected high-grade stenosis at the distal right PICA.  Preliminary findings discussed with Dr. Bauman at the time of image interpretation.      Electronically signed by: Bud Quezada MD  Date:    04/07/2019  Time:    10:28             Narrative:    EXAMINATION:  CTA STROKE MULTI-PHASE    CLINICAL HISTORY:  Stroke;    TECHNIQUE:  Non contrast low dose axial images were obtained thought the head. CT angiogram was performed from the level of the shashank to the top of the head following the IV administration of 75mL of Omnipaque 350.   Sagittal and coronal reconstructions and maximum intensity projection reconstructions were performed. Arterial stenosis percentages are based on NASCET measurement criteria.  Two additional phases of immediate post-contrast CTA images were performed through the head  alone.    COMPARISON:  CT 09/15/2015.    FINDINGS:  CT head:    There are multifocal areas of parenchymal hypoattenuation involving the right frontoparietal region and temporal lobe consistent with an evolving recent infarction.  There is high attenuation within the right MCA in keeping with intraluminal thrombus.    No intracranial hemorrhage.  There is patchy periventricular white matter hypoattenuation, nonspecific but most suggestive of chronic microvascular ischemic changes.  No hydrocephalus.  No midline shift or mass effect.  No abnormal intra or extra-axial fluid collections.    Paranasal sinuses and mastoids are clear.  No acute osseous abnormalities.  Globes are symmetric.  Subcutaneous soft tissues are within normal limits.    Non-Vascular Structures of the Neck/Thoracic Inlet (limited evaluation): The left thyroid lobe is not visualized.    Aorta: Normal 3 vessel arch.    Extracranial carotid circulation:    There is a focal area of 50% stenosis involving the proximal right ICA.    There is a long segment of approximately 70-80 % stenosis involving the left common carotid artery.    There is mild stenosis at the origin of the left ICA.    Extracranial vertebral circulation:    No hemodynamically significant stenosis, aneurysmal dilatation, or dissection.    Intracranial Arteries:    There is an occluded right MCA at its origin without significant collateral circulation.    There is a suspected focal area of high-grade stenosis involving the distal right PICA.    Venous structures (limited evaluation): Normal.                                 Medical Decision Making:   History:   I obtained history from: EMS provider and someone other than patient.       <> Summary of History:   Old Medical Records: I decided to obtain old medical records.  Old Records Summarized: records from clinic visits, records from previous admission(s) and records from another hospital.  Initial Assessment:   78-year-old  female with a history of diabetes, hypertension, hyperlipidemia and dementia presenting with acute stroke.  Differential Diagnosis:   Differential diagnosis includes was not limited to:  CVA, TIA, intracranial hemorrhage, intracerebral hemorrhage, subarachnoid hemorrhage, DKA, hypertensive emergency, encephalopathy, renal failure, ACS  Independently Interpreted Test(s):   I have ordered and independently interpreted X-rays - see prior notes.  I have ordered and independently interpreted EKG Reading(s) - see prior notes  Clinical Tests:   Lab Tests: Reviewed and Ordered  Radiological Study: Ordered and Reviewed  Medical Tests: Reviewed and Ordered  ED Management:  Stroke team activation  CT imaging of the brain  IV line x2  Labs  Insulin drip  ECG, cardiac/telemetry monitoring  Vascular Neurology consult  Neuro Critical Care consult  Intubation for airway protection  Critical care time  Dexetomidate for sedation  IV fluids  Other:   I have discussed this case with another health care provider.    Emergent evaluation of patient presenting with left-sided extremity weakness with last well known at 10:00 p.m. last night.  She is brought in via EMS, hypertensive, unresponsive with flaccid left-sided upper and lower extremity. Unable to obtain any history from the patient as she is unable to respond.  She is responding to pain only.  Physical exam findings with flaccid weakness of the left side upper and lower extremity.  Right gaze deviation.  Stroke code initiated immediately, stroke team evaluated the patient at bedside and patient taken immediately to CT scan.  A large infarct/ischemic area noticed in the distribution of the right MCA, and a CT multiphase was obtained. Given the severity of the ischemic area and the length of time since onset, the decision to intervene or tPA was not chosen.  I had a discussion with the stroke neurology team, who decided tPA or intervention at this time would not be recommended.  I  discussed these findings with the patient as well as the team, along with goals of care.  Given patient's mental status and GCS, the decision to intubate for airway protection was taken.  Please see above critical care note and intubation note for procedure and critical care time.  I discussed the case with Neuro Critical Care team, and the patient was admitted for ongoing management.  Of note during her workup, she was found to be severely hyperglycemia with blood sugars greater than 600.  She is acidotic, in acute renal failure, with an anion gap of 29 with concern for DKA.  I discussed these findings with to Neuro Critical Care team, who will admit and treat at this time with insulin drip and further management.    Complexity:  Critical          Scribe Attestation:   Scribe #1: I performed the above scribed service and the documentation accurately describes the services I performed. I attest to the accuracy of the note.    I, Dr. Trevor Bauman, personally performed the services described in this documentation. All medical record entries made by the scribe were at my direction and in my presence.  I have reviewed the chart and agree that the record reflects my personal performance and is accurate and complete.              Clinical Impression:       ICD-10-CM ICD-9-CM   1. Acute ischemic left middle cerebral artery (MCA) stroke I63.512 434.91   2. Stroke I63.9 434.91   3. Hyperglycemia R73.9 790.29   4. Hypertension, unspecified type I10 401.9   5. Endotracheally intubated Z97.8 V49.89   6. Acute renal failure, unspecified acute renal failure type N17.9 584.9         Disposition:   Disposition: Admitted  Condition: Critical         Trevor Bauman DO  Dept of Emergency Medicine   [facility]  Spectralink: [id number]                 Trevor Bauman DO  04/07/19 132

## 2019-04-07 NOTE — ASSESSMENT & PLAN NOTE
Patient with history of HTN  No on current home medications  BP goal <220/105 mmHg for 24 hour after stoke  Labetalol and Nicardipine PRN ordered

## 2019-04-07 NOTE — PROCEDURES
"Sherita Mahmood is a 78 y.o. female patient.    Temp: 97.7 °F (36.5 °C) (04/07/19 0940)  Pulse: 91 (04/07/19 1352)  Resp: 17 (04/07/19 0940)  BP: (!) 116/49 (04/07/19 1352)  SpO2: 100 % (04/07/19 1352)  Weight: 72.6 kg (160 lb) (04/07/19 0940)  Height: 5' 5" (165.1 cm) (04/07/19 0940)       Arterial Line  Date/Time: 4/7/2019 2:23 PM  Location procedure was performed: [facility] NEURO CRITICAL CARE  Performed by: Jerry Harper MD  Authorized by: Jerry Harper MD   Pre-op Diagnosis: CVA  Post-operative diagnosis: CVA  Consent Done: Emergent Situation  Preparation: Patient was prepped and draped in the usual sterile fashion.  Indications: multiple ABGs, respiratory failure and hemodynamic monitoring  Location: left radial  Needle gauge: 20  Seldinger technique: Seldinger technique used  Number of attempts: 2  Complications: No  Post-procedure: dressing applied  Patient tolerance: Patient tolerated the procedure well with no immediate complications  Comments: Verbal consent obtained from           Jerry Harper  4/7/2019  "

## 2019-04-07 NOTE — HPI
Ms. Mahmood is 78 y.o AA female with PMHx of HTN (ran out medications), DMTII (on metformin 500 mg BID), HLD (on Atorvastatin 20 mg), Hypothyroidisms(on Levothyroxine 25 mg daily) and Dementia (started 112 years ago), who was BIB EMS due to L side weakness and Gaze deviation to R. Patient was LKN yesterday around 10 pm. Her Her  reports that he found her unresponsive around 7:30 AM this morning (although he is not exactly sure that he correctly remembers the time). She had urinated on herself during the night. EMS arrived shortly after 9 AM. Per  also, patient has been very tired and she rather wants to be at her bed for the last 2 weeks.  denies fevers, chills, cough, chest pain, sob, rhinorrhea, headache, abdominal pain or changes in BM/urinary habits, other than constipation. Patient ran out of BP medications for unknown period of time. She was only taking Atorvastatin, Metformin and Levothyroxine at home. Patient does not have history of smoker or stroke in the past. She was diagnosed with dementia 1 1/2 years ago, but she was oriented to herself, walks and talks at baseline and able to bathe, dress and feed herself.    Upon arrival to ED patient was found with R gaze deviation, LUE/LLE flaccid paralysis, and RUE/RLE able to slowly do some spontaneous movements. Patient was not verbal and no able to follow commands. Patient was stroke activated and CTH reported R MCA stroke and occlusion. Patient was out of tPA window and non-candidate for thrombectomy.   History was taken by chart review and 's interview

## 2019-04-07 NOTE — SUBJECTIVE & OBJECTIVE
Past Medical History:   Diagnosis Date    Breast cancer     Diabetes mellitus     Hyperlipidemia     Hypertension     Mild non proliferative diabetic retinopathy 10/13/2014    Nuclear sclerosis 10/13/2014    Open angle with borderline findings and low glaucoma risk in both eyes 10/13/2014    Osteoarthritis of right hip 2015     Past Surgical History:   Procedure Laterality Date    BREAST LUMPECTOMY       Family History   Problem Relation Age of Onset    Cancer Maternal Aunt     No Known Problems Mother     No Known Problems Father     No Known Problems Sister     No Known Problems Brother     No Known Problems Maternal Uncle     No Known Problems Paternal Aunt     No Known Problems Paternal Uncle     No Known Problems Maternal Grandmother     No Known Problems Maternal Grandfather     No Known Problems Paternal Grandmother     No Known Problems Paternal Grandfather     Amblyopia Neg Hx     Blindness Neg Hx     Cataracts Neg Hx     Diabetes Neg Hx     Glaucoma Neg Hx     Hypertension Neg Hx     Macular degeneration Neg Hx     Retinal detachment Neg Hx     Strabismus Neg Hx     Stroke Neg Hx     Thyroid disease Neg Hx      Social History     Tobacco Use    Smoking status: Former Smoker     Types: Cigarettes     Last attempt to quit: 1962     Years since quittin.7    Smokeless tobacco: Never Used   Substance Use Topics    Alcohol use: Yes     Alcohol/week: 0.0 oz     Comment: former heavy use of wine? difficult to assess    Drug use: No     Review of patient's allergies indicates:  No Known Allergies    Medications: I have reviewed the current medication administration record.      (Not in a hospital admission)    Review of Systems   Constitutional: Negative for chills and fever.   HENT: Negative for congestion and drooling.    Eyes: Positive for visual disturbance.   Respiratory: Negative for cough and shortness of breath.    Cardiovascular: Negative  for palpitations and leg swelling.   Gastrointestinal: Negative for diarrhea and vomiting.   Genitourinary: Negative for frequency and urgency.   Musculoskeletal: Negative for arthralgias and gait problem.   Skin: Negative for pallor and rash.   Neurological: Positive for facial asymmetry, speech difficulty and weakness.   Psychiatric/Behavioral: Negative for agitation and behavioral problems.     Objective:     Vital Signs (Most Recent):  Temp: 97.7 °F (36.5 °C) (04/07/19 0940)  Pulse: 92 (04/07/19 1151)  Resp: 17 (04/07/19 0940)  BP: (!) 156/59 (04/07/19 1151)  SpO2: 100 % (04/07/19 1151)    Vital Signs Range (Last 24H):  Temp:  [97.7 °F (36.5 °C)]   Pulse:  []   Resp:  [17]   BP: ()/(48-91)   SpO2:  [97 %-100 %]     Physical Exam   Constitutional: She appears well-developed and well-nourished.   HENT:   Head: Normocephalic and atraumatic.   Eyes: Pupils are equal, round, and reactive to light.   Right gaze deviation     Cardiovascular: Normal rate and regular rhythm.   Pulmonary/Chest: Effort normal. No respiratory distress.   Abdominal: She exhibits no distension. There is no guarding.   Musculoskeletal: Normal range of motion. She exhibits no edema.   Neurological:   See below   Skin: Skin is warm and dry.   Psychiatric: She has a normal mood and affect.       Neurological Exam:   LOC: alert  Attention Span: poor  Language: Global aphasia  Articulation: Dysarthria  Orientation: Untestable due to severe aphasia   Visual Fields: Hemianopsia left  EOM (CN III, IV, VI): right gaze deviation   Pupils (CN II, III): PERRL  Facial Sensation (CN V): Normal  Facial Movement (CN VII): Lower facial weakness on the Left  Gag Reflex: not examined  Reflexes: not examined   Motor: Arm left  Plegia 0/5  Leg left  Paresis: 1/5  Arm right  Paresis: 2/5  Leg right Paresis: 2/5  Cebellar: No evidence of appendicular or axial ataxia  Sensation: WD in all extremities  Tone: spastic in right upper extremity and right lower  extremity.  Flaccid in left upper extremity       Laboratory:  CMP:   Recent Labs   Lab 04/07/19  0945   CALCIUM 10.6*   ALBUMIN 3.8   PROT 8.6*   *   K 5.9*   CO2 15*      BUN 96*   CREATININE 3.9*   ALKPHOS 118   ALT 22   AST 23   BILITOT 0.6     CBC:   Recent Labs   Lab 04/07/19  0945   WBC 17.82*   RBC 6.34*   HGB 14.6   HCT 51.6*      MCV 81*   MCH 23.0*   MCHC 28.3*     Lipid Panel:   Recent Labs   Lab 04/07/19  0945   CHOL 290*   LDLCALC 192.2*   HDL 49   TRIG 244*     Hgb A1C:   Recent Labs   Lab 04/07/19  0945   HGBA1C 11.4*     TSH:   Recent Labs   Lab 04/07/19  0945   TSH 2.690       Diagnostic Results:      Brain imaging:  CTA multiphase 04/07/19  Right m1 occlusion.  Large evolving right middle cerebral artery stroke.  Loss of gray/white differentiation, blunting of the sulci  Cytotoxic cerebral edema      Cardiac Evaluation:

## 2019-04-07 NOTE — SUBJECTIVE & OBJECTIVE
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      Current Outpatient Medications on File Prior to Encounter   Medication Sig Dispense Refill    aspirin 81 MG Chew Take 1 tablet (81 mg total) by mouth once daily. 90 tablet 3    benazepril-hydrochlorthiazide (LOTENSIN HCT) 20-25 mg Tab Take 1 tablet by mouth once daily.      blood sugar diagnostic, drum (ACCU-CHEK COMPACT TEST) Strp  Strip In Vitro Twice a day       donepezil (ARICEPT) 10 MG tablet TAKE 1 TABLET BY MOUTH EVERY DAY 90 tablet 6    donepezil (ARICEPT) 10 MG tablet Take 1 tablet (10 mg total) by mouth once daily. 90 tablet 3    glimepiride (AMARYL) 2 MG tablet Take 2 mg by mouth before breakfast.      meloxicam (MOBIC) 15 MG tablet Take 1 tablet (15 mg total) by mouth once daily. 30 tablet 0    MULTIVITAMIN ORAL Take by mouth. 1 Tablet Oral Every other day      simvastatin (ZOCOR) 40 MG tablet Take 40 mg by mouth. 1 Tablet Oral Every day      thiamine 50 MG tablet Take 1 tablet (50 mg total) by mouth once daily. 90 tablet 3      Allergies: Patient has no known allergies.    Family History   Problem Relation Age of Onset    Cancer Maternal Aunt     No Known Problems Mother     No Known Problems Father     No Known Problems Sister     No Known Problems Brother     No Known Problems Maternal Uncle     No Known Problems Paternal Aunt     No Known Problems Paternal Uncle     No Known Problems Maternal Grandmother     No Known Problems Maternal Grandfather     No Known Problems Paternal  Grandmother     No Known Problems Paternal Grandfather     Amblyopia Neg Hx     Blindness Neg Hx     Cataracts Neg Hx     Diabetes Neg Hx     Glaucoma Neg Hx     Hypertension Neg Hx     Macular degeneration Neg Hx     Retinal detachment Neg Hx     Strabismus Neg Hx     Stroke Neg Hx     Thyroid disease Neg Hx      Social History     Tobacco Use    Smoking status: Former Smoker     Types: Cigarettes     Last attempt to quit: 1962     Years since quittin.7    Smokeless tobacco: Never Used   Substance Use Topics    Alcohol use: Yes     Alcohol/week: 0.0 oz     Comment: former heavy use of wine? difficult to assess    Drug use: No     Review of Systems   Unable to assess due to patient is obtunded  Objective:     Vitals:    Temp: 97.7 °F (36.5 °C)  Pulse: 92  BP: (!) 156/59  MAP (mmHg): 88  Resp: 17  SpO2: 100 %  O2 Device (Oxygen Therapy): room air    Temp  Min: 97.7 °F (36.5 °C)  Max: 97.7 °F (36.5 °C)  Pulse  Min: 86  Max: 107  BP  Min: 78/48  Max: 199/89  MAP (mmHg)  Min: 57  Max: 132  Resp  Min: 17  Max: 17  SpO2  Min: 97 %  Max: 100 %    No intake/output data recorded.         Physical Exam   Constitutional: She appears well-developed.   Looks chronically ill. BL temple atrophy   HENT:   Head: Normocephalic and atraumatic.   Eyes:   Pupils reactive to light, sluggish  R gaze deviation   Neck: No JVD present. No tracheal deviation present.   Cardiovascular: Normal rate, regular rhythm and normal heart sounds. Exam reveals no gallop and no friction rub.   No murmur heard.  Pulmonary/Chest: She has no wheezes. She has no rales.   S/p intubation    Abdominal: Soft. Bowel sounds are normal. She exhibits no distension. There is no tenderness.   Musculoskeletal: She exhibits no edema or deformity.   Neurological:   Mute, unable to follow commands  No facial asymmetry noted  L sided flaccid palsy, R sided spontaneous movements noted  Coordination: Unable to test     Vitals reviewed.        Today I  personally reviewed pertinent medications, lines/drains/airways, imaging, cardiology results, laboratory results, notably:

## 2019-04-07 NOTE — ASSESSMENT & PLAN NOTE
At baseline patient is elderly with severe dementia (per ).  Now with a large acute ischemic stroke having a meaningful recovery is not impossible but less likely.  As glucose abnormalities are corrected by the NCC team the next 48-72 hours will be critical to see the extent of cerebral edema as a result of brain tissue damage.  Physical examination and serial CT scans will help diagnose compression of the brain structure and brain stem.  Patient is at high risk for mortality due to stroke and if patient is able to survive this periods we will be able to assess disabilities and quality of life as a result of stroke.     Discussed with

## 2019-04-07 NOTE — ED TRIAGE NOTES
Patient in by EMS for eval of unresponsiveness and stroke-like symptoms. LSN by family at 10 pm last night. Patient nonverbal on arrival with R sided gaze. Withdraws from pain to R arm/leg and L leg. No movement noted to R arm. Dr. Bauman and stroke team at bedside on patient arrival.

## 2019-04-07 NOTE — CONSULTS
[facility]  Vascular Neurology  Comprehensive Stroke Center  Consult Note    Inpatient consult to Vascular (Stroke) Neurology  Consult performed by: Ebonie Matos PA-C  Consult ordered by: Jerry Harper MD  Reason for consult: left sided weakness        Assessment/Plan:     Patient is a 78 y.o. year old female with:    * Embolic stroke involving right middle cerebral artery  Patient is a 78 year old female with medical history of severe dementia, HTN, HLD who presented to the ED with left sided weakness and right gaze preference.  Patient was last seen normal by  last night at 10:00pm.   noticed when she woke up this morning at 7:30am she was not moving left side.  Patient's blood glucose was greater than 600.  Patient taken to CT scan and right m1 occlusion seen.  Not interventional candidate due to significant ischemic changes. Etiology ESUS.    Antithrombotics for secondary stroke prevention: Antiplatelets: Aspirin: 81 mg daily    Statins for secondary stroke prevention and hyperlipidemia, if present:   Statins: Atorvastatin- 40 mg daily    Aggressive risk factor modification: HTN, DM, HLD, Diet     Rehab efforts: PT/OT/SLP to evaluate and treat    Diagnostics ordered/pending: HgbA1C to assess blood glucose levels, Lipid Profile to assess cholesterol levels, TTE to assess cardiac function/status , would like to get MRI brain if patient doesn't have metal in her body, serial CT heads     VTE prophylaxis: Heparin 5000 units SQ every 8 hours    BP parameters: Infarct: No intervention, SBP <220        Cytotoxic cerebral edema  Large area of cytotoxic cerebral edema identified when reviewing brain imaging in the territory of the right middle cerebral artery. There is not mass effect associated with it. We will continue to monitor the patients clinical exam for any worsening of symptoms which may indicate expansion of the stroke or the area of the edema resulting in the  clinical change. The pattern is suggestive of embolic etiology.        Goals of care, counseling/discussion  At baseline patient is elderly with severe dementia (per ).  Now with a large acute ischemic stroke having a meaningful recovery is not impossible but less likely.  As glucose abnormalities are corrected by the NCC team the next 48-72 hours will be critical to see the extent of cerebral edema as a result of brain tissue damage.  Physical examination and serial CT scans will help diagnose compression of the brain structure and brain stem.  Patient is at high risk for mortality due to stroke and if patient is able to survive this periods we will be able to assess disabilities and quality of life as a result of stroke.     Discussed with            Other specified hypothyroidism  On levothyroxine at home     Stage 5 chronic kidney disease not on chronic dialysis  Risk factor for stroke     Type 2 diabetes mellitus with neurologic complication  Risk factor for stroke  Glucose greater than 600  Insulin gtt,NCC managing       Essential hypertension  Risk factor for stroke  SBP goal less than 220        STROKE DOCUMENTATION          NIH Scale:  1a. Level of Consciousness: 3-->Responds only with reflex motor or autonomic effects or totally unresponsive, flaccid, and areflexic  1b. LOC Questions: 2-->Answers neither question correctly  1c. LOC Commands: 2-->Performs neither task correctly  2. Best Gaze: 2-->Forced deviation, or total gaze paresis not overcome by the oculocephalic maneuver  3. Visual: 2-->Complete hemianopia  4. Facial Palsy: 2-->Partial paralysis (total or near-total paralysis of lower face)  5a. Motor Arm, Left: 4-->No movement  5b. Motor Arm, Right: 3-->No effort against gravity, limb falls  6a. Motor Leg, Left: 3-->No effort against gravity, leg falls to bed immediately  6b. Motor Leg, Right: 3-->No effort against gravity, leg falls to bed immediately  7. Limb Ataxia: 0-->Absent  8.  Sensory: 0-->Normal, no sensory loss  9. Best Language: 2-->Severe aphasia, all communication is through fragmentary expression, great need for inference, questioning, and guessing by the listener. Range of information that can be exchanged is limited, listener carries burden of. . . (see row details)  10. Dysarthria: 2-->Severe dysarthria, patients speech is so slurred as to be unintelligible in the absence of or out of proportion to any dysphasia, or is mute/anarthric  11. Extinction and Inattention (formerly Neglect): 2-->Profound narinder-inattention/extinction more than 1 modality  Total (NIH Stroke Scale): 32    Modified Jigar Score: (P) 3  Bairon Coma Scale:7   ABCD2 Score:    [id number] Score:   HAS -BLED Score:   ICH Score:   Hunt & Bray Classification:       Thrombolysis Candidate? No, Out of window       Interventional Revascularization Candidate?   Is the patient eligible for mechanical endovascular reperfusion (SELENA)?  No; No ischemic penumbra      Hemorrhagic change of an Ischemic Stroke: Does this patient have an ischemic stroke with hemorrhagic changes? No     Subjective:     History of Present Illness:  Patient is a 78 year old female with medical history of severe dementia, HTN, HLD who presented to the ED with left sided weakness and right gaze preference.  Patient was last seen normal by  last night at 10:00pm.   noticed when she woke up this morning at 7:30am she was not moving left side.  Patient's blood glucose was greater than 600.  Patient taken to CT scan and right m1 occlusion seen.  Not interventional candidate due to significant ischemic changes.           Past Medical History:   Diagnosis Date    Breast cancer 2003/1995    Diabetes mellitus     Hyperlipidemia     Hypertension     Mild non proliferative diabetic retinopathy 10/13/2014    Nuclear sclerosis 10/13/2014    Open angle with borderline findings and low glaucoma risk in both eyes 10/13/2014    Osteoarthritis  of right hip 2015     Past Surgical History:   Procedure Laterality Date    BREAST LUMPECTOMY       Family History   Problem Relation Age of Onset    Cancer Maternal Aunt     No Known Problems Mother     No Known Problems Father     No Known Problems Sister     No Known Problems Brother     No Known Problems Maternal Uncle     No Known Problems Paternal Aunt     No Known Problems Paternal Uncle     No Known Problems Maternal Grandmother     No Known Problems Maternal Grandfather     No Known Problems Paternal Grandmother     No Known Problems Paternal Grandfather     Amblyopia Neg Hx     Blindness Neg Hx     Cataracts Neg Hx     Diabetes Neg Hx     Glaucoma Neg Hx     Hypertension Neg Hx     Macular degeneration Neg Hx     Retinal detachment Neg Hx     Strabismus Neg Hx     Stroke Neg Hx     Thyroid disease Neg Hx      Social History     Tobacco Use    Smoking status: Former Smoker     Types: Cigarettes     Last attempt to quit: 1962     Years since quittin.7    Smokeless tobacco: Never Used   Substance Use Topics    Alcohol use: Yes     Alcohol/week: 0.0 oz     Comment: former heavy use of wine? difficult to assess    Drug use: No     Review of patient's allergies indicates:  No Known Allergies    Medications: I have reviewed the current medication administration record.      (Not in a hospital admission)    Review of Systems   Constitutional: Negative for chills and fever.   HENT: Negative for congestion and drooling.    Eyes: Positive for visual disturbance.   Respiratory: Negative for cough and shortness of breath.    Cardiovascular: Negative for palpitations and leg swelling.   Gastrointestinal: Negative for diarrhea and vomiting.   Genitourinary: Negative for frequency and urgency.   Musculoskeletal: Negative for arthralgias and gait problem.   Skin: Negative for pallor and rash.   Neurological: Positive for facial asymmetry, speech difficulty and weakness.    Psychiatric/Behavioral: Negative for agitation and behavioral problems.     Objective:     Vital Signs (Most Recent):  Temp: 97.7 °F (36.5 °C) (04/07/19 0940)  Pulse: 92 (04/07/19 1151)  Resp: 17 (04/07/19 0940)  BP: (!) 156/59 (04/07/19 1151)  SpO2: 100 % (04/07/19 1151)    Vital Signs Range (Last 24H):  Temp:  [97.7 °F (36.5 °C)]   Pulse:  []   Resp:  [17]   BP: ()/(48-91)   SpO2:  [97 %-100 %]     Physical Exam   Constitutional: She appears well-developed and well-nourished.   HENT:   Head: Normocephalic and atraumatic.   Eyes: Pupils are equal, round, and reactive to light.   Right gaze deviation     Cardiovascular: Normal rate and regular rhythm.   Pulmonary/Chest: Effort normal. No respiratory distress.   Abdominal: She exhibits no distension. There is no guarding.   Musculoskeletal: Normal range of motion. She exhibits no edema.   Neurological:   See below   Skin: Skin is warm and dry.   Psychiatric: She has a normal mood and affect.       Neurological Exam:   LOC: alert  Attention Span: poor  Language: Global aphasia  Articulation: Dysarthria  Orientation: Untestable due to severe aphasia   Visual Fields: Hemianopsia left  EOM (CN III, IV, VI): right gaze deviation   Pupils (CN II, III): PERRL  Facial Sensation (CN V): Normal  Facial Movement (CN VII): Lower facial weakness on the Left  Gag Reflex: not examined  Reflexes: not examined   Motor: Arm left  Plegia 0/5  Leg left  Paresis: 1/5  Arm right  Paresis: 2/5  Leg right Paresis: 2/5  Cebellar: No evidence of appendicular or axial ataxia  Sensation: WD in all extremities  Tone: spastic in right upper extremity and right lower extremity.  Flaccid in left upper extremity       Laboratory:  CMP:   Recent Labs   Lab 04/07/19  0945   CALCIUM 10.6*   ALBUMIN 3.8   PROT 8.6*   *   K 5.9*   CO2 15*      BUN 96*   CREATININE 3.9*   ALKPHOS 118   ALT 22   AST 23   BILITOT 0.6     CBC:   Recent Labs   Lab 04/07/19  0945   WBC 17.82*   RBC  6.34*   HGB 14.6   HCT 51.6*      MCV 81*   MCH 23.0*   MCHC 28.3*     Lipid Panel:   Recent Labs   Lab 04/07/19  0945   CHOL 290*   LDLCALC 192.2*   HDL 49   TRIG 244*     Hgb A1C:   Recent Labs   Lab 04/07/19  0945   HGBA1C 11.4*     TSH:   Recent Labs   Lab 04/07/19  0945   TSH 2.690       Diagnostic Results:      Brain imaging:  CTA multiphase 04/07/19  Right m1 occlusion.  Large evolving right middle cerebral artery stroke.  Loss of gray/white differentiation, blunting of the sulci  Cytotoxic cerebral edema      Cardiac Evaluation:         Ebonie Matos PA-C  Comprehensive Stroke Center  Department of Vascular Neurology   [facility]

## 2019-04-07 NOTE — ASSESSMENT & PLAN NOTE
Patient with Hx of DMTII, home med Metformin (hold it)  A1c: 11.4, K: 5.9, B, B-OH 6.4 on DKA (likely 2/2 insulin deficiency)  On insulin drip, IVF maintenance and bolus   BG Q/4hour  Hypoglycemia protocol ordered

## 2019-04-07 NOTE — H&P
[facility]  Neurocritical Care  History & Physical    Admit Date: 4/7/2019  Service Date: 04/07/2019  Length of Stay: 0    Subjective:     Chief Complaint: <principal problem not specified>    History of Present Illness:   Ms. Mahmood is 78 y.o AA female with PMHx of HTN (ran out medications), DMTII (on metformin 500 mg BID), HLD (on Atorvastatin 20 mg), Hypothyroidisms(on Levothyroxine 25 mg daily) and Dementia (started 112 years ago), who was BIB EMS due to L side weakness and Gaze deviation to R. Patient was LKN yesterday around 10 pm. Her Her  reports that he found her unresponsive around 7:30 AM this morning (although he is not exactly sure that he correctly remembers the time). She had urinated on herself during the night. EMS arrived shortly after 9 AM. Per  also, patient has been very tired and she rather wants to be at her bed for the last 2 weeks.  denies fevers, chills, cough, chest pain, sob, rhinorrhea, headache, abdominal pain or changes in BM/urinary habits, other than constipation. Patient ran out of BP medications for unknown period of time. She was only taking Atorvastatin, Metformin and Levothyroxine at home. Patient does not have history of smoker or stroke in the past. She was diagnosed with dementia 1 1/2 years ago, but she was oriented to herself, walks and talks at baseline and able to bathe, dress and feed herself.    Upon arrival to ED patient was found with R gaze deviation, LUE/LLE flaccid paralysis, and RUE/RLE able to slowly do some spontaneous movements. Patient was not verbal and no able to follow commands. Patient was stroke activated and CTH reported R MCA stroke and occlusion. Patient was out of tPA window and non-candidate for thrombectomy.   History was taken by chart review and 's interview      Past Medical History:   Diagnosis Date    Breast cancer 2003/1995    Diabetes mellitus     Hyperlipidemia     Hypertension     Mild  non proliferative diabetic retinopathy 10/13/2014    Nuclear sclerosis 10/13/2014    Open angle with borderline findings and low glaucoma risk in both eyes 10/13/2014    Osteoarthritis of right hip 1/7/2015     Past Surgical History:   Procedure Laterality Date    BREAST LUMPECTOMY  1995      No current facility-administered medications on file prior to encounter.      Current Outpatient Medications on File Prior to Encounter   Medication Sig Dispense Refill    aspirin 81 MG Chew Take 1 tablet (81 mg total) by mouth once daily. 90 tablet 3    benazepril-hydrochlorthiazide (LOTENSIN HCT) 20-25 mg Tab Take 1 tablet by mouth once daily.      blood sugar diagnostic, drum (ACCU-CHEK COMPACT TEST) Strp  Strip In Vitro Twice a day       donepezil (ARICEPT) 10 MG tablet TAKE 1 TABLET BY MOUTH EVERY DAY 90 tablet 6    donepezil (ARICEPT) 10 MG tablet Take 1 tablet (10 mg total) by mouth once daily. 90 tablet 3    glimepiride (AMARYL) 2 MG tablet Take 2 mg by mouth before breakfast.      meloxicam (MOBIC) 15 MG tablet Take 1 tablet (15 mg total) by mouth once daily. 30 tablet 0    MULTIVITAMIN ORAL Take by mouth. 1 Tablet Oral Every other day      simvastatin (ZOCOR) 40 MG tablet Take 40 mg by mouth. 1 Tablet Oral Every day      thiamine 50 MG tablet Take 1 tablet (50 mg total) by mouth once daily. 90 tablet 3      Allergies: Patient has no known allergies.    Family History   Problem Relation Age of Onset    Cancer Maternal Aunt     No Known Problems Mother     No Known Problems Father     No Known Problems Sister     No Known Problems Brother     No Known Problems Maternal Uncle     No Known Problems Paternal Aunt     No Known Problems Paternal Uncle     No Known Problems Maternal Grandmother     No Known Problems Maternal Grandfather     No Known Problems Paternal Grandmother     No Known Problems Paternal Grandfather     Amblyopia Neg Hx     Blindness Neg Hx     Cataracts Neg Hx     Diabetes  Neg Hx     Glaucoma Neg Hx     Hypertension Neg Hx     Macular degeneration Neg Hx     Retinal detachment Neg Hx     Strabismus Neg Hx     Stroke Neg Hx     Thyroid disease Neg Hx      Social History     Tobacco Use    Smoking status: Former Smoker     Types: Cigarettes     Last attempt to quit: 1962     Years since quittin.7    Smokeless tobacco: Never Used   Substance Use Topics    Alcohol use: Yes     Alcohol/week: 0.0 oz     Comment: former heavy use of wine? difficult to assess    Drug use: No     Review of Systems   Unable to assess due to patient is obtunded  Objective:     Vitals:    Temp: 97.7 °F (36.5 °C)  Pulse: 92  BP: (!) 156/59  MAP (mmHg): 88  Resp: 17  SpO2: 100 %  O2 Device (Oxygen Therapy): room air    Temp  Min: 97.7 °F (36.5 °C)  Max: 97.7 °F (36.5 °C)  Pulse  Min: 86  Max: 107  BP  Min: 78/48  Max: 199/89  MAP (mmHg)  Min: 57  Max: 132  Resp  Min: 17  Max: 17  SpO2  Min: 97 %  Max: 100 %    No intake/output data recorded.         Physical Exam   Constitutional: She appears well-developed.   Looks chronically ill. BL temple atrophy   HENT:   Head: Normocephalic and atraumatic.   Eyes:   Pupils reactive to light, sluggish  R gaze deviation   Neck: No JVD present. No tracheal deviation present.   Cardiovascular: Normal rate, regular rhythm and normal heart sounds. Exam reveals no gallop and no friction rub.   No murmur heard.  Pulmonary/Chest: She has no wheezes. She has no rales.   S/p intubation    Abdominal: Soft. Bowel sounds are normal. She exhibits no distension. There is no tenderness.   Musculoskeletal: She exhibits no edema or deformity.   Neurological:   Mute, unable to follow commands  No facial asymmetry noted  L sided flaccid palsy, R sided spontaneous movements noted  Coordination: Unable to test     Vitals reviewed.        Today I personally reviewed pertinent medications, lines/drains/airways, imaging, cardiology results, laboratory results,  notably:      Assessment/Plan:     Neuro  Stroke  Patient with multiples risk factors like HTN, HLD, DM TII. Non-compliant with medications  Patient last seen normal on 19 @ 10:00 pm, out of tPA window. No thrombectomy candidate due to CTH showed ischemic changes  CTA stroke multiphase showed: Large R MCA distribution acute infarct. Occluded R MCA at its origin. Short segment of approximately 70-80% stenosis involving the distal common carotid artery. 50% stenosis at the origin of the right ICA. Focal area of suspected high-grade stenosis at the distal right PICA.  Vascular stroke following, appreciated recs  Admitted to NICU with Neuro Q1 check  Normoglycemia and normothermia is preferred  NPO  BP goal <220/105 mmhg. Labetalol and Cardene PRN  ASA 81 mg, Plavix 75 mg and Atorvastatin 40 mg 1 tab PO daily  MRI brain w/o contrast ordered  TTE ordered  ST/PT/OT consulted  Lipid panel with .2 (Goal in diabetic patient <70), A1c: 11.4, TSH 2.69    Cardiac/Vascular  HBP (high blood pressure)  Patient with history of HTN  No on current home medications  BP goal <220/105 mmHg for 24 hour after stoke  Labetalol and Nicardipine PRN ordered    Renal/  Stage 5 chronic kidney disease not on chronic dialysis  Acute kidney injury on CKD s/p 5, no on dialysis  Likely 2/2 DM and HTN  GEF: 12.2. Cr/BUN: 3.9/96, unknown baseline  On IVF    Endocrine  Other specified hypothyroidism  Continue on home levothyroxine 25 mcg daily  TSH: 2.69    Type 2 diabetes mellitus  Patient with Hx of DMTII, home med Metformin (hold it)  A1c: 11.4, K: 5.9, B, B-OH 6.4 on DKA (likely 2/2 insulin deficiency)  On insulin drip, IVF maintenance and bolus   BG Q/4hour  Hypoglycemia protocol ordered          The patient is being Prophylaxed for:  Venous Thromboembolism with: Chemical  Stress Ulcer with: None  Ventilator Pneumonia with: chlorhexidine oral care    Activity Orders          Diet NPO: NPO starting at  1024        Full  Code    Vince Neil MD  Neurocritical Care  [facility]

## 2019-04-07 NOTE — ASSESSMENT & PLAN NOTE
Patient is a 78 year old female with medical history of severe dementia, HTN, HLD who presented to the ED with left sided weakness and right gaze preference.  Patient was last seen normal by  last night at 10:00pm.   noticed when she woke up this morning at 7:30am she was not moving left side.  Patient's blood glucose was greater than 600.  Patient taken to CT scan and right m1 occlusion seen.  Not interventional candidate due to significant ischemic changes. Etiology ESUS.    Antithrombotics for secondary stroke prevention: Antiplatelets: Aspirin: 81 mg daily    Statins for secondary stroke prevention and hyperlipidemia, if present:   Statins: Atorvastatin- 40 mg daily    Aggressive risk factor modification: HTN, DM, HLD, Diet     Rehab efforts: PT/OT/SLP to evaluate and treat    Diagnostics ordered/pending: HgbA1C to assess blood glucose levels, Lipid Profile to assess cholesterol levels, TTE to assess cardiac function/status , would like to get MRI brain if patient doesn't have metal in her body, serial CT heads     VTE prophylaxis: Heparin 5000 units SQ every 8 hours    BP parameters: Infarct: No intervention, SBP <220

## 2019-04-07 NOTE — PROGRESS NOTES
Patient received at 1825 on transport vent. Patient was placed on a ventilator with documented settings. She is 7.5 tube, 23 at the lip. Will continue to monitor.

## 2019-04-07 NOTE — ASSESSMENT & PLAN NOTE
Large area of cytotoxic cerebral edema identified when reviewing brain imaging in the territory of the right middle cerebral artery. There is not mass effect associated with it. We will continue to monitor the patients clinical exam for any worsening of symptoms which may indicate expansion of the stroke or the area of the edema resulting in the clinical change. The pattern is suggestive of embolic etiology.

## 2019-04-07 NOTE — HOSPITAL COURSE
: Mri with right MCA CVA,  updated at bedside, trend lactate, hemicrani watch  04/10/2019: loss of pupillary reaction, given mannitol   : d/c statin, started d5W 75 ml/hr, family discussion POC pending   : comfort measures-->

## 2019-04-07 NOTE — ASSESSMENT & PLAN NOTE
Acute kidney injury on CKD s/p 5, no on dialysis  Likely 2/2 DM and HTN  GEF: 12.2. Cr/BUN: 3.9/96, unknown baseline  On IVF

## 2019-04-08 PROBLEM — G93.5 BRAIN COMPRESSION: Status: ACTIVE | Noted: 2019-01-01

## 2019-04-08 PROBLEM — R40.2430 GLASGOW COMA SCALE TOTAL SCORE 3-8: Status: ACTIVE | Noted: 2019-01-01

## 2019-04-08 PROBLEM — D72.829 LEUKOCYTOSIS: Status: ACTIVE | Noted: 2019-01-01

## 2019-04-08 PROBLEM — J96.01 ACUTE RESPIRATORY FAILURE WITH HYPOXIA: Status: ACTIVE | Noted: 2019-01-01

## 2019-04-08 PROBLEM — E78.5 HLD (HYPERLIPIDEMIA): Status: ACTIVE | Noted: 2019-01-01

## 2019-04-08 PROBLEM — E87.8 ELECTROLYTE ABNORMALITY: Status: ACTIVE | Noted: 2019-01-01

## 2019-04-08 PROBLEM — Z73.6 LIMITATION OF ACTIVITIES DUE TO DISABILITY: Status: ACTIVE | Noted: 2019-01-01

## 2019-04-08 PROBLEM — E03.9 ACQUIRED HYPOTHYROIDISM: Status: ACTIVE | Noted: 2019-01-01

## 2019-04-08 PROBLEM — I61.1 NONTRAUMATIC CORTICAL HEMORRHAGE OF RIGHT CEREBRAL HEMISPHERE: Status: ACTIVE | Noted: 2019-01-01

## 2019-04-08 PROBLEM — I99.8 ISCHEMIA OF HAND: Status: ACTIVE | Noted: 2019-01-01

## 2019-04-08 NOTE — ASSESSMENT & PLAN NOTE
At baseline patient is elderly with severe dementia (per ).  Now with a large acute ischemic stroke having a meaningful recovery is not impossible but less likely.  As glucose abnormalities are corrected by the NCC team the next 48-72 hours will be critical to see the extent of cerebral edema as a result of brain tissue damage.  Physical examination and serial CT scans will help diagnose compression of the brain structure and brain stem.  Patient is at high risk for mortality due to stroke and if patient is able to survive this periods we will be able to assess disabilities and quality of life as a result of stroke.

## 2019-04-08 NOTE — PLAN OF CARE
Problem: Adult Inpatient Plan of Care  Goal: Plan of Care Review  Outcome: Ongoing (interventions implemented as appropriate)  POC reviewed with pt and  at 1500. Pt's  verbalized understanding, pt showed no evidence of learning. All questions and concerns addressed. No change in neuro exam, R sided fingers and arm continuing to discolor d/t decreased blood flow. Will continue to monitor. See flowsheets for full assessment and VS info

## 2019-04-08 NOTE — PT/OT/SLP EVAL
Occupational Therapy   Evaluation    Name: Sherita Mahmood  MRN: [id number]  Admitting Diagnosis:  Embolic stroke involving right middle cerebral artery      Recommendations:     Discharge Recommendations: (pending extubation)  Discharge Equipment Recommendations:     Barriers to discharge:  Decreased caregiver support, Inaccessible home environment    Assessment:     Sherita Mahmood is a 79 y.o. female with a medical diagnosis of Embolic stroke involving right middle cerebral artery.  She presents with performance deficits affecting function: weakness, impaired self care skills, impaired balance, decreased coordination, impaired endurance, impaired functional mobilty, impaired sensation, decreased lower extremity function, decreased upper extremity function, impaired coordination, impaired fine motor, impaired cardiopulmonary response to activity, decreased safety awareness, decreased ROM, impaired joint extensibility.      Rehab Prognosis: Fair; patient would benefit from acute skilled OT services to address these deficits and reach maximum level of function.       Plan:     Patient to be seen 3 x/week to address the above listed problems via self-care/home management, neuromuscular re-education, cognitive retraining, therapeutic activities, therapeutic exercises, sensory integration  · Plan of Care Expires: 05/06/19  · Plan of Care Reviewed with: patient    Subjective     Patient:  Nonverbal; intubated    Occupational Profile:  Per chart review, Patient resides with  in [place].  PTA patient was able to walk and talk but confused; assistance required with bathing. (severe dementia).  Family not present during eval.  Will obtain further details from family regarding prior level of functioning.    Pain/Comfort:  · Pain Rating 1: 0/10  · Pain Rating Post-Intervention 1: 0/10    Patients cultural, spiritual, Caodaism conflicts given the current situation: no    Objective:     Communicated with: Nurse prior  to session.  Patient found supine with arterial line, bed alarm, blood pressure cuff, telemetry, SCD, pulse ox (continuous), restraints, ventilator, pressure relief boots, peripheral IV, levy catheter(OG tube) upon OT entry to room.    General Precautions: Standard, aspiration, NPO, fall   Orthopedic Precautions:N/A   Braces: N/A     Occupational Performance:    Bed Mobility:    · Patient completed Rolling/Turning to Left with  dependent  · Patient completed Rolling/Turning to Right with dependent    Functional Mobility/Transfers:  Dependent drawsheet transfers     Activities of Daily Living:  · Feeding:  NPO    · Grooming: total assistance while supine    Cognitive/Visual Perceptual:  Cognitive/Psychosocial Skills:     -       Oriented to: Daily orientation provided   -       Follows Commands/attention:Unable to follow one step commands  -       Communication: Nonverbal; intubated    Physical Exam:  Postural examination/scapula alignment:    -       Rounded shoulders  Skin integrity:Mottled, right UE  Edema:  None noted  Upper Extremity Range of Motion:     -       Right Upper Extremity:Limited right hand 2* contracture MCP, PIP, DIP flexion  -       Left Upper Extremity: PROM WNL    AMPAC 6 Click ADL:  AMPAC Total Score: 6    Treatment & Education:  Patient education provided on role of OT and need for continued OT upon discharge.   Continued education, patient/ family training recommended. Daily orientation provided.  PROM performed bilateral UE/LEs one set x 10 rep in all planes of motion with stretches provided at end range; sustained stretch provided for right hip/knee flexion and right hand, MCP, PIP, DIP extension.  Assistance and facilitation provided for upward rotation of the scapula during shoulder flexion and abduction.  Patient kept eyes closed 90% of the session.  Patient unable to follow commands.  Positioning provided for midline orientation with bilateral UEs elevated and heels lifted off  mattress.  Family not present during the session.  White board updated in patient's room.    Education:    Patient left supine with all lines intact and call button in reach    GOALS:   Multidisciplinary Problems     Occupational Therapy Goals        Problem: Occupational Therapy Goal    Goal Priority Disciplines Outcome Interventions   Occupational Therapy Goal     OT, PT/OT     Description:  Goals set 4/8 to be addressed for 14 days with expiration date, 4/22:  Patient will increase functional independence with ADLs by performing:    Patient will demonstrate rolling to the right with max assist.  Not met   Patient will demonstrate rolling to the left with max assist.   Not met  Patient will demonstrate supine -sit with max  assist.   Not met  Patient will demonstrate squat pivot transfers with max assist.   Not met  Patient will demonstrate grooming while seated with max assist.   Not met  Patient will demonstrate upper body dressing with max assist while seated EOB.   Not met  Patient will demonstrate lower body dressing with max assist while seated EOB.   Not met  Patient will demonstrate toileting with max assist.   Not met  Patient will demonstrate bathing while seated EOB with max assist.   Not met  Patient will demonstrate ability to follow 2/5 commands.   Not met  Patient's family / caregiver will demonstrate independence and safety with assisting patient with self-care skills and functional mobility.     Not met  Patient's family / caregiver will demonstrate independence with providing ROM and changes in bed positioning.   Not met                        History:     Past Medical History:   Diagnosis Date    Breast cancer 2003/1995    Diabetes mellitus     Hyperlipidemia     Hypertension     Mild non proliferative diabetic retinopathy 10/13/2014    Nuclear sclerosis 10/13/2014    Open angle with borderline findings and low glaucoma risk in both eyes 10/13/2014    Osteoarthritis of right hip 1/7/2015        Past Surgical History:   Procedure Laterality Date    BREAST LUMPECTOMY  1995       Time Tracking:     OT Date of Treatment: 04/08/19  OT Start Time: 0410  OT Stop Time: 0425  OT Total Time (min): 15 min    Billable Minutes:Evaluation 15    LUCIANA Sharp  4/8/2019

## 2019-04-08 NOTE — CONSULTS
[facility]  Vascular Surgery  Consult Note    Inpatient consult to Vascular Surgery  Consult performed by: Tarun Fernández MD  Consult ordered by: Mariusz Garcia NP        Subjective:     Chief Complaint/Reason for Admission: R hand ischemia    History of Present Illness: 79 y F with severe dementia, HTN, and DM presents with L hemiplegia, found to have severe R hemispheric stroke, NIH stroke scale 32. Her  found her unresponsive at 0730 this morning. She was taken to the ED, and intubated at 1030. The ED attempted R radial art line placement at 1430. They were reportedly able to place the catheter into the artery but had no back bleeding. It was then removed and placed in the L radial instead. When she was transferred to the neuro ICU at 1930, she was noted to have absent R radial and ulnar pulses. Her fingers were also dusky appearing. At 2230 she was noted to have a cold, mottled R hand with muscle contractures of her fingers, as well as absent brachial, radial, and ulnar doppler signals. At this point vascular surgery was consulted    Upon review of her CTA done for her stroke, it is noted that her R axillary artery occludes distally. She is noted on RUE US to have an abrupt occlusion of the R distal axillary with collateral flow at the level of the brachial, but no reconstitution to the radial or ulnar arteries, which are occluded.    Medications Prior to Admission   Medication Sig Dispense Refill Last Dose    aspirin 81 MG Chew Take 1 tablet (81 mg total) by mouth once daily. 90 tablet 3 4/6/2019    benazepril-hydrochlorthiazide (LOTENSIN HCT) 20-25 mg Tab Take 1 tablet by mouth once daily.   4/6/2019    blood sugar diagnostic, drum (ACCU-CHEK COMPACT TEST) Strp  Strip In Vitro Twice a day    4/6/2019    donepezil (ARICEPT) 10 MG tablet TAKE 1 TABLET BY MOUTH EVERY DAY 90 tablet 6 4/6/2019    donepezil (ARICEPT) 10 MG tablet Take 1 tablet (10 mg total) by mouth once daily. 90  tablet 3 2019    glimepiride (AMARYL) 2 MG tablet Take 2 mg by mouth before breakfast.   2019    meloxicam (MOBIC) 15 MG tablet Take 1 tablet (15 mg total) by mouth once daily. 30 tablet 0 2019    MULTIVITAMIN ORAL Take by mouth. 1 Tablet Oral Every other day   2019    simvastatin (ZOCOR) 40 MG tablet Take 40 mg by mouth. 1 Tablet Oral Every day   2019    thiamine 50 MG tablet Take 1 tablet (50 mg total) by mouth once daily. 90 tablet 3 2019       Review of patient's allergies indicates:  No Known Allergies    Past Medical History:   Diagnosis Date    Breast cancer     Diabetes mellitus     Hyperlipidemia     Hypertension     Mild non proliferative diabetic retinopathy 10/13/2014    Nuclear sclerosis 10/13/2014    Open angle with borderline findings and low glaucoma risk in both eyes 10/13/2014    Osteoarthritis of right hip 2015     Past Surgical History:   Procedure Laterality Date    BREAST LUMPECTOMY       Family History     Problem Relation (Age of Onset)    Cancer Maternal Aunt    No Known Problems Mother, Father, Sister, Brother, Maternal Uncle, Paternal Aunt, Paternal Uncle, Maternal Grandmother, Maternal Grandfather, Paternal Grandmother, Paternal Grandfather        Tobacco Use    Smoking status: Former Smoker     Types: Cigarettes     Last attempt to quit: 1962     Years since quittin.7    Smokeless tobacco: Never Used   Substance and Sexual Activity    Alcohol use: Yes     Alcohol/week: 0.0 oz     Comment: former heavy use of wine? difficult to assess    Drug use: No    Sexual activity: Yes     Partners: Male     Birth control/protection: Post-menopausal     Review of Systems   All other systems reviewed and are negative.    Objective:     Vital Signs (Most Recent):  Temp: 97.5 °F (36.4 °C) (19 1905)  Pulse: 86 (19 0005)  Resp: 15 (19)  BP: 117/75 (19 1840)  SpO2: 100 % (19) Vital Signs (24h  Range):  Temp:  [97.5 °F (36.4 °C)-97.7 °F (36.5 °C)] 97.5 °F (36.4 °C)  Pulse:  [] 86  Resp:  [15-31] 15  SpO2:  [97 %-100 %] 100 %  BP: ()/(48-91) 117/75  Arterial Line BP: ()/(57-93) 91/80     Weight: 64.5 kg (142 lb 3.2 oz)  Body mass index is 23.66 kg/m².    Physical Exam   Constitutional: She is sedated and intubated.   Cardiovascular: Normal rate and regular rhythm.   Pulmonary/Chest: She is intubated.   Musculoskeletal:   R hand cold and mottled  Fingers contracted  Absent R brachial, radial, and ulnar signals       Significant Labs:  CBC:   Recent Labs   Lab 04/07/19  0945   WBC 17.82*   RBC 6.34*   HGB 14.6   HCT 51.6*      MCV 81*   MCH 23.0*   MCHC 28.3*     CMP:   Recent Labs   Lab 04/07/19  1846   *   CALCIUM 9.6   ALBUMIN 3.2*   PROT 7.0   *   K 4.1   CO2 19*   *   BUN 82*   CREATININE 2.9*   ALKPHOS 100   ALT 16   AST 20   BILITOT 0.4       Significant Diagnostics:  I have reviewed all pertinent imaging results/findings within the past 24 hours.   CTA shows occlusion of R distal axillary artery  RUE US shows occlusion of R distal axillary, brachial, radial, and ulnar arteries with large collaterals at the level of the brachial    Assessment/Plan:     Ischemia of hand  79 y F with severe dementia, HTN, and DM presents with L hemiplegia, found to have severe R hemispheric stroke, NIH stroke scale 32. After intubation, R radial art line was attempted at 1430, but unsuccessful. She then developed progressive cold and mottling of her R hand. At the time of consultation, her fingers were contracted. Her imaging findings of an occluded distal R axillary artery with large collaterals distal to it indicate that she likely had a chronic occlusion of the artery with monophasic flow to her hand that was interrupted after arterial line attempt.    She currently has Phu Class III ischemia of her R hand. Given the contracture of her fingers, vascular intervention  is not indicated as it would not restore function to her hand. Also, given her age, dementia, and massive evolving stroke, recommend goals of care discussion with  and palliative consult. If she were to recover from this event, she would likely need an orthopedic consult for evaluation for RUE amputation.    YESSICA Carter        Thank you for your consult. I will sign off. Please contact us if you have any additional questions.    Tarun Fernández MD  Vascular Surgery  [facility]

## 2019-04-08 NOTE — SUBJECTIVE & OBJECTIVE
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      Current Outpatient Medications on File Prior to Encounter   Medication Sig Dispense Refill    aspirin 81 MG Chew Take 1 tablet (81 mg total) by mouth once daily. 90 tablet 3    atorvastatin (LIPITOR) 20 MG tablet Take 20 mg by mouth once daily.      blood sugar diagnostic, drum (ACCU-CHEK COMPACT TEST) Strp  Strip In Vitro Twice a day       levothyroxine (SYNTHROID) 25 MCG tablet Take 25 mcg by mouth once daily.      metFORMIN (GLUCOPHAGE) 500 MG tablet Take 500 mg by mouth 2 (two) times daily with meals.      MULTIVITAMIN ORAL Take by mouth. 1 Tablet Oral Every other day      [DISCONTINUED] glimepiride (AMARYL) 2 MG tablet Take 2 mg by mouth before breakfast.      [DISCONTINUED] meloxicam (MOBIC) 15 MG tablet Take 1 tablet (15 mg total) by mouth once daily. 30 tablet 0    [DISCONTINUED] simvastatin (ZOCOR) 40 MG tablet Take 40 mg by mouth. 1 Tablet Oral Every day      [DISCONTINUED] thiamine 50 MG tablet Take 1 tablet (50 mg total) by mouth once daily. 90 tablet 3    benazepril-hydrochlorthiazide (LOTENSIN HCT) 20-25 mg Tab Take 1 tablet by mouth once daily.      donepezil (ARICEPT) 10 MG tablet TAKE 1 TABLET BY MOUTH EVERY DAY 90 tablet 6    [DISCONTINUED] donepezil (ARICEPT) 10 MG tablet Take 1 tablet (10 mg total) by mouth once daily. 90 tablet 3      Allergies: Patient has no known allergies.    Family History   Problem Relation Age of Onset    Cancer Maternal Aunt     No Known Problems Mother      No Known Problems Father     No Known Problems Sister     No Known Problems Brother     No Known Problems Maternal Uncle     No Known Problems Paternal Aunt     No Known Problems Paternal Uncle     No Known Problems Maternal Grandmother     No Known Problems Maternal Grandfather     No Known Problems Paternal Grandmother     No Known Problems Paternal Grandfather     Amblyopia Neg Hx     Blindness Neg Hx     Cataracts Neg Hx     Diabetes Neg Hx     Glaucoma Neg Hx     Hypertension Neg Hx     Macular degeneration Neg Hx     Retinal detachment Neg Hx     Strabismus Neg Hx     Stroke Neg Hx     Thyroid disease Neg Hx      Social History     Tobacco Use    Smoking status: Former Smoker     Types: Cigarettes     Last attempt to quit: 1962     Years since quittin.7    Smokeless tobacco: Never Used   Substance Use Topics    Alcohol use: Yes     Alcohol/week: 0.0 oz     Comment: former heavy use of wine? difficult to assess    Drug use: No     Review of Systems   Unable to perform ROS: Intubated     Objective:     Vitals:    Temp: 98.7 °F (37.1 °C)  Pulse: 97  Rhythm: normal sinus rhythm  BP: 139/64  MAP (mmHg): 92  Resp: (!) 31  SpO2: 100 %  Oxygen Concentration (%): 40  O2 Device (Oxygen Therapy): ventilator  Vent Mode: A/C  Set Rate: 14 bmp  Vt Set: 420 mL  PEEP/CPAP: 5 cmH20  Peak Airway Pressure: 20 cmH2O  Mean Airway Pressure: 11 cmH20  Plateau Pressure: 0 cmH20    Temp  Min: 97.5 °F (36.4 °C)  Max: 98.7 °F (37.1 °C)  Pulse  Min: 86  Max: 100  BP  Min: 117/75  Max: 149/64  MAP (mmHg)  Min: 88  Max: 110  Resp  Min: 14  Max: 31  SpO2  Min: 97 %  Max: 100 %  Oxygen Concentration (%)  Min: 40  Max: 102    04/07 0701 - 04/08 0700  In: 3982.9 [I.V.:2462.9]  Out: 954 [Urine:954]   Unmeasured Output  Stool Occurrence: 0       Physical Exam   Constitutional: She appears well-developed.   HENT:   Head: Normocephalic and atraumatic.   Neck: Neck supple.   Cardiovascular: Normal rate.    Pulmonary/Chest:   Mechanical BS throughout   Abdominal: Soft.   Neurological: She is unresponsive. GCS eye subscore is 1. GCS verbal subscore is 1. GCS motor subscore is 2.   Pupils fixed BL, L 3mm, R 7mm  L corneal intact, R corneal absent  Absent OC to doll's eye  Cough/gag intact  Not breathing over vent at time of exam   Skin:   Cold RUE with ischemic change to distal digits

## 2019-04-08 NOTE — SUBJECTIVE & OBJECTIVE
Neurologic Chief Complaint: R MCA/PCA stroke     Subjective:     Interval History: Patient is seen for follow-up neurological assessment and treatment recommendations:    currently on hypertonics. Large R MCA/PCA with hemorrhagic conversion.   Also with ischemic right hand without plans for vascular surgery intervention     HPI, Past Medical, Family, and Social History remains the same as documented in the initial encounter.     Review of Systems   Constitutional: Negative for fever.   Eyes: Positive for visual disturbance.   Skin: Positive for color change.   Neurological: Positive for weakness.   Psychiatric/Behavioral: Negative for agitation and behavioral problems.     Scheduled Meds:   atorvastatin  40 mg Per OG tube Daily    chlorhexidine  15 mL Mouth/Throat BID    famotidine  20 mg Per OG tube Daily    nitroGLYCERIN 2% TD oint  0.5 inch Topical (Top) Q6H    potassium chloride 10%  40 mEq Per NG tube Q2H    potassium, sodium phosphates  2 packet Per NG tube Q4H     Continuous Infusions:   dextrose 5 % and 0.45 % NaCl 50 mL/hr at 04/08/19 1605    insulin (HUMAN R) infusion (adults) 1.17 Units/hr (04/08/19 1605)    nicardipine 2.5 mg/hr (04/08/19 1605)     PRN Meds:dextrose 50%, dextrose 50%, sodium chloride 0.9%    Objective:     Vital Signs (Most Recent):  Temp: 98.7 °F (37.1 °C) (04/08/19 1505)  Pulse: 98 (04/08/19 1700)  Resp: (!) 31 (04/08/19 1605)  BP: 139/64 (04/08/19 1605)  SpO2: 99 % (04/08/19 1700)  BP Location: Left arm    Vital Signs Range (Last 24H):  Temp:  [97.5 °F (36.4 °C)-98.7 °F (37.1 °C)]   Pulse:  []   Resp:  [14-31]   BP: (117-149)/(51-89)   SpO2:  [97 %-100 %]   Arterial Line BP: ()/()   BP Location: Left arm    Physical Exam   Constitutional: She appears well-developed and well-nourished.   HENT:   Head: Normocephalic and atraumatic.   Eyes:   Fixed gaze right   Cardiovascular: Normal rate.   Skin:   mottling of right hand. Pulseless at wrist    Nursing note and  vitals reviewed.      Neurological Exam:   LOC: comatose  Attention Span: poor  Language: Intubated  Orientation: Untestable due to intubation  Visual Fields: does not blink to threat   EOM (CN III, IV, VI): gaze pref right   Pupils (CN II, III): PERRL  Facial Movement (CN VII): Unable to test   Motor: Arm left  Plegia 0/5  Leg left  Plegia 0/5  Arm right  Plegia 0/5  Leg right Plegia 2/5  Cebellar: unable to test   Sensation: Josh-anesthesia left  Tone: Flaccid  LUE , LLE and RUE    Laboratory:  CMP:   Recent Labs   Lab 04/08/19  1211  04/08/19  1610   CALCIUM 9.3  --   --    ALBUMIN 2.8*  --   --    PROT 6.4  --   --    *   < > 158*   K 3.0*  --   --    CO2 22*  --   --    *  --   --    BUN 50*  --   --    CREATININE 1.5*  --   --    ALKPHOS 83  --   --    ALT 16  --   --    AST 76*  --   --    BILITOT 0.7  --   --     < > = values in this interval not displayed.     CBC:   Recent Labs   Lab 04/08/19  0106   WBC 21.40*   RBC 5.46*   HGB 12.3   HCT 40.5      MCV 74*   MCH 22.5*   MCHC 30.4*     Lipid Panel:   Recent Labs   Lab 04/07/19  0945   CHOL 290*   LDLCALC 192.2*   HDL 49   TRIG 244*     Coagulation:   Recent Labs   Lab 04/07/19  0945   INR 1.1     Platelet Aggregation Study: No results for input(s): PLTAGG, PLTAGINTERP, PLTAGREGLACO, ADPPLTAGGREG in the last 168 hours.  Hgb A1C:   Recent Labs   Lab 04/07/19  0945   HGBA1C 11.4*     TSH:   Recent Labs   Lab 04/07/19  0945   TSH 2.690       Diagnostic Results     Brain Imaging   4/7/19 - CTA mp  1. Large right MCA distribution acute infarct.  No intracranial hemorrhage.  2. Occluded right MCA at its origin.  3. Short segment of approximately 70-80% stenosis involving the distal common carotid artery.  4. 50% stenosis at the origin of the right ICA.  5. Focal area of suspected high-grade stenosis at the distal right PICA.  Preliminary findings discussed with Dr. Bauamn at the time of image interpretation.    4/8/19 - MRI brain   1. Large  acute right MCA and PCA distribution infarct with hemorrhagic conversion.  Significant edema and mass effect on the right cerebral hemisphere with compression of the right lateral ventricle and 6 mm leftward midline shift.  2. Several additional scattered punctate areas of acute infarction throughout the left cerebral hemisphere consistent with embolic infarcts.  3. Enlarged temporal horn of the left lateral ventricle worrisome for hydrocephalus.  4. Loss of right MCA flow void consistent with occlusion identified on prior CTA.    Echo - 4/8/19  · Technically difficult study on a mechanically ventilated patient  · Normal left ventricular systolic function. The estimated ejection fraction is 65%  · Normal right ventricular systolic function.  · Normal LV diastolic function.  · No wall motion abnormalities.  · The IVC is smaller than would typically be seen with positive pressure ventilation, suggesting possible central hypovolemia.  · Normal LA

## 2019-04-08 NOTE — CONSULTS
[facility]  Neurosurgery  Consult Note    Consults  Subjective:     Chief Complaint/Reason for Admission: Stroke    History of Present Illness: Patient is a 79 year old female with a history of DM2, HTN, HLD, dementia who presented 4/7 with R MCA and PCA stroke after being found unresponsive at home. No tPA or thrombectomy. Today on MRI discovered to have hemorrhagic conversion. No neurostatus changes. Also with ischemic R hand, vascular is consulted.     Medications Prior to Admission   Medication Sig Dispense Refill Last Dose    aspirin 81 MG Chew Take 1 tablet (81 mg total) by mouth once daily. 90 tablet 3 4/6/2019    benazepril-hydrochlorthiazide (LOTENSIN HCT) 20-25 mg Tab Take 1 tablet by mouth once daily.   4/6/2019    blood sugar diagnostic, drum (ACCU-CHEK COMPACT TEST) Strp  Strip In Vitro Twice a day    4/6/2019    donepezil (ARICEPT) 10 MG tablet TAKE 1 TABLET BY MOUTH EVERY DAY 90 tablet 6 4/6/2019    donepezil (ARICEPT) 10 MG tablet Take 1 tablet (10 mg total) by mouth once daily. 90 tablet 3 4/6/2019    glimepiride (AMARYL) 2 MG tablet Take 2 mg by mouth before breakfast.   4/6/2019    meloxicam (MOBIC) 15 MG tablet Take 1 tablet (15 mg total) by mouth once daily. 30 tablet 0 4/6/2019    MULTIVITAMIN ORAL Take by mouth. 1 Tablet Oral Every other day   4/6/2019    simvastatin (ZOCOR) 40 MG tablet Take 40 mg by mouth. 1 Tablet Oral Every day   4/6/2019    thiamine 50 MG tablet Take 1 tablet (50 mg total) by mouth once daily. 90 tablet 3 4/6/2019       Review of patient's allergies indicates:  No Known Allergies    Past Medical History:   Diagnosis Date    Breast cancer 2003/1995    Diabetes mellitus     Hyperlipidemia     Hypertension     Mild non proliferative diabetic retinopathy 10/13/2014    Nuclear sclerosis 10/13/2014    Open angle with borderline findings and low glaucoma risk in both eyes 10/13/2014    Osteoarthritis of right hip 1/7/2015     Past Surgical  History:   Procedure Laterality Date    BREAST LUMPECTOMY       Family History     Problem Relation (Age of Onset)    Cancer Maternal Aunt    No Known Problems Mother, Father, Sister, Brother, Maternal Uncle, Paternal Aunt, Paternal Uncle, Maternal Grandmother, Maternal Grandfather, Paternal Grandmother, Paternal Grandfather        Tobacco Use    Smoking status: Former Smoker     Types: Cigarettes     Last attempt to quit: 1962     Years since quittin.7    Smokeless tobacco: Never Used   Substance and Sexual Activity    Alcohol use: Yes     Alcohol/week: 0.0 oz     Comment: former heavy use of wine? difficult to assess    Drug use: No    Sexual activity: Yes     Partners: Male     Birth control/protection: Post-menopausal     Review of Systems   Unable to perform ROS: Intubated     Objective:     Weight: 64.5 kg (142 lb 3.2 oz)  Body mass index is 23.66 kg/m².  Vital Signs (Most Recent):  Temp: 97.8 °F (36.6 °C) (19 2305)  Pulse: 95 (19 0400)  Resp: 15 (19 0400)  BP: (!) 149/64 (19 0205)  SpO2: 100 % (19 0400) Vital Signs (24h Range):  Temp:  [97.5 °F (36.4 °C)-97.8 °F (36.6 °C)] 97.8 °F (36.6 °C)  Pulse:  [] 95  Resp:  [15-31] 15  SpO2:  [97 %-100 %] 100 %  BP: ()/(48-91) 149/64  Arterial Line BP: ()/() 124/104                Vent Mode: A/C  Oxygen Concentration (%):  [] 41  Resp Rate Total:  [14 br/min-19 br/min] 14 br/min  Vt Set:  [450 mL] 450 mL  PEEP/CPAP:  [5 cmH20] 5 cmH20  Pressure Support:  [0 cmH20] 0 cmH20  Mean Airway Pressure:  [8.2 cmH20-10 cmH20] 8.5 cmH20         NG/OG Tube 19 1024 orogastric 18 Fr. Center mouth (Active)            Urethral Catheter 19 (Active)   Output (mL) 17 mL 2019  1:05 AM       Neurosurgery Physical Exam    E1VTM4  Intubated.  PERRL, resists eye opening.  +cough, gag  Withdraws x4  Spontaneous movement R side.    Significant Labs:  Recent Labs   Lab 19  1420 19  4309  04/08/19  0106   * 576* 122*   * 162* 166*   K 6.4* 4.1 3.4*   * 124* 130*   CO2 11* 19* 23   BUN 90* 82* 70*   CREATININE 3.5* 2.9* 2.1*   CALCIUM 9.9 9.6 9.7   MG  --   --  3.1*     Recent Labs   Lab 04/07/19  0945 04/08/19  0106   WBC 17.82* 21.40*   HGB 14.6 12.3   HCT 51.6* 40.5    224     Recent Labs   Lab 04/07/19  0945   INR 1.1     Microbiology Results (last 7 days)     Procedure Component Value Units Date/Time    Urine culture [[id number]] Collected:  04/07/19 2303    Order Status:  No result Specimen:  Urine Updated:  04/07/19 6367        All pertinent labs from the last 24 hours have been reviewed.    Significant Diagnostics:  I have reviewed and interpreted all pertinent imaging results/findings within the past 24 hours.    Assessment/Plan:     Nontraumatic cortical hemorrhage of right cerebral hemisphere  79F PMH HTN, HLD, dementia, DM presented with R MCA and PCA infarct now with hemorrhagic conversion.    -- No acute neurosurgical intervention necessary.  -- SBP <160.  -- Hold all anticoagulation.  -- HOB >30  -- Na goal 145-155.  -- Recommend maximum medical management  -- Repeat CTH 6 hours after MRI.  -- Medical management per NCC.         Thank you for your consult. I will follow-up with patient. Please contact us if you have any additional questions.    Jayesh Waite MD  Neurosurgery  [facility]

## 2019-04-08 NOTE — CONSULTS
"  [facility]  Adult Nutrition  Consult Note    SUMMARY     Recommendations    Recommendation/Intervention:   1. If unable to extubate >72hrs, recommend starting TF.   Glucerna 1.5 @ goal rate 40mL/hr.   - Initiate @ 10mL/hr and increase by 10mL q4hrs, or as tolerated, until goal rate is reached.   - Hold for residuals >500mL.   - Provides 1440kcals, 79g protein and 729mL free water.     2. If able to extubate and advance diet, recommend Diabetic diet with texture per SLP recommendations.     RD to monitor.  Goals: Pt to receive nutrition by RD follow up  Nutrition Goal Status: new  Communication of RD Recs: reviewed with RN    Reason for Assessment    Reason For Assessment: consult  Diagnosis: stroke/CVA  Relevant Medical History: HTN, T2DM, HLD, dementia  Interdisciplinary Rounds: attended  General Information Comments: Pt intubated. Family at bedside providing nutrition hx. Pt's family reports adequate po intake, no weight loss and excellent appetite PTA. Pt's  reports pt does not follow diabetic diet restriction. Education inappropriate at this time 2/2 intubation (HgbA1c 11.4). NFPE not indicated at this time as pt does not meet critieria for malnutrition.  Nutrition Discharge Planning: unable to determine at this time    Nutrition Risk Screen    Nutrition Risk Screen: no indicators present    Nutrition/Diet History    Spiritual, Cultural Beliefs, Restorationism Practices, Values that Affect Care: no  Factors Affecting Nutritional Intake: NPO, on mechanical ventilation    Anthropometrics    Temp: 98.6 °F (37 °C)  Height: 5' 5" (165.1 cm)  Height (inches): 65 in  Weight Method: Bed Scale  Weight: 64.5 kg (142 lb 3.2 oz)  Weight (lb): 142.2 lb  Ideal Body Weight (IBW), Female: 125 lb  % Ideal Body Weight, Female (lb): 128 lb  BMI (Calculated): 26.7  BMI Grade: 25 - 29.9 - overweight       Lab/Procedures/Meds    Pertinent Labs Reviewed: reviewed  Pertinent Labs Comments: Na 161, BUN 61, Cr " 1.8, POCT Glu  163-270, HgbA1c 11.4  Pertinent Medications Reviewed: reviewed  Pertinent Medications Comments: D5, insulin, cardene    Estimated/Assessed Needs    Weight Used For Calorie Calculations: 64.5 kg (142 lb 3.2 oz)  Energy Calorie Requirements (kcal): 1443  Energy Need Method: [facility]  Protein Requirements: 78-97g(1.2-1.5g/kg)  Weight Used For Protein Calculations: 64.5 kg (142 lb 3.2 oz)  Fluid Requirements (mL): 1mL/kcal or per MD     RDA Method (mL): 1443  CHO Requirement: 50% of total kcals      Nutrition Prescription Ordered    Current Diet Order: NPO    Evaluation of Received Nutrient/Fluid Intake    IV Fluid (mL): 2400  I/O: +I/O, good UOP, LBM 4/7  % Intake of Estimated Energy Needs: 0 - 25 %  % Meal Intake: NPO    Nutrition Risk    Level of Risk/Frequency of Follow-up: (f/u 2x/week)     Assessment and Plan     Nutrition Problem  Inadequate energy intake    Related to (etiology):   NPO    Signs and Symptoms (as evidenced by):   Pt receiving <85% EEN and EPN.     Intervention:  Collaboration of nutrition care    Nutrition Diagnosis Status:   New      Monitor and Evaluation    Food and Nutrient Intake: energy intake, food and beverage intake, enteral nutrition intake  Food and Nutrient Adminstration: diet order, enteral and parenteral nutrition administration  Knowledge/Beliefs/Attitudes: food and nutrition knowledge/skill  Anthropometric Measurements: weight, weight change, body mass index  Biochemical Data, Medical Tests and Procedures: electrolyte and renal panel, gastrointestinal profile, glucose/endocrine profile, inflammatory profile, lipid profile  Nutrition-Focused Physical Findings: overall appearance       Nutrition Follow-Up    RD Follow-up?: Yes

## 2019-04-08 NOTE — PLAN OF CARE
04/08/19 1616   Discharge Assessment   Assessment Type Discharge Planning Assessment   Confirmed/corrected address and phone number on facesheet? Yes   Assessment information obtained from? Caregiver  ()   Expected Length of Stay (days) 5   Communicated expected length of stay with patient/caregiver yes   Prior to hospitilization cognitive status: Unable to Assess  (patient with H/O dementia)   Prior to hospitalization functional status: Assistive Equipment;Needs Assistance  (Per , patient ambulates with standard walker and needs assistance with all adls and medicatons)   Current cognitive status: Unable to Assess   Current Functional Status: Completely Dependent   Lives With spouse   Able to Return to Prior Arrangements yes   Is patient able to care for self after discharge? Unable to determine at this time (comments)   Who are your caregiver(s) and their phone number(s)?  Kurt Silverman () [phone]   Patient's perception of discharge disposition other (comments)  (alba)   Readmission Within the Last 30 Days no previous admission in last 30 days   Patient currently being followed by outpatient case management? No   Patient currently receives any other outside agency services? No   Equipment Currently Used at Home walker, standard   Do you have any problems affording any of your prescribed medications? No   Is the patient taking medications as prescribed? yes   Does the patient have transportation home? Yes   Transportation Anticipated family or friend will provide   Does the patient receive services at the [facility]mad[facility]? No   Discharge Plan A Home with family;Home Health   Discharge Plan B Skilled Nursing Facility   DME Needed Upon Discharge  other (see comments)  (tbd)   Patient/Family in Agreement with Plan yes         Discharge/ My Health Packet Folder Given to patient/family:      yes       PCP:  Primary Doctor None        Pharmacy:    [facility] - [place], [place] - [address]  [address] [place] [id number]  Phone: [phone] Fax: [phone]            Emergency Contacts:  Extended Emergency Contact Information  Primary Emergency Contact: Jackie Herrera   [facility]  Home Phone: [phone]  Relation: Relative  Secondary Emergency Contact: Irene Vargas   [facility]  Mobile Phone: [phone]  Relation: Sister      Insurance:  Payor: [employer] / Plan: [employer] [facility] HEALTH / Product Type: Medicare Advantage /     Alicja Hussein RN, CCRN-K, CCM  Neuro-Critical Care   X [phone]

## 2019-04-08 NOTE — PROGRESS NOTES
Manometry for Central Line Procedure     Manometry Performed: yes    Manometry performed by: Dat Park MD

## 2019-04-08 NOTE — ASSESSMENT & PLAN NOTE
79F PMH HTN, HLD, dementia, DM presented with R MCA and PCA infarct now with hemorrhagic conversion.    -- No acute neurosurgical intervention necessary.  -- SBP <160.  -- Hold all anticoagulation.  -- HOB >30  -- Na goal 145-155.  -- Recommend maximum medical management  -- Repeat CTH 6 hours after MRI.  -- Medical management per NCC.

## 2019-04-08 NOTE — ASSESSMENT & PLAN NOTE
No vent weaning for now  Daily abg  Daily cxr  Vent Mode: A/C  Oxygen Concentration (%):  [] 40  Resp Rate Total:  [14 br/min-31 br/min] 30 br/min  Vt Set:  [420 mL-450 mL] 420 mL  PEEP/CPAP:  [5 cmH20] 5 cmH20  Mean Airway Pressure:  [7.8 zjC17-32 cmH20] 11 cmH20   Discussed with  may need trach and peg in future

## 2019-04-08 NOTE — HPI
Patient is a 79 year old female with a history of DM2, HTN, HLD, dementia who presented 4/7 with R MCA and PCA stroke after being found unresponsive at home. No tPA or thrombectomy. Today on MRI discovered to have hemorrhagic conversion. No neurostatus changes. Also with ischemic R hand, vascular is consulted.

## 2019-04-08 NOTE — ASSESSMENT & PLAN NOTE
Patient is a 78 year old female with medical history of severe dementia, HTN, HLD who presented to the ED with left sided weakness and right gaze preference.  Patient was last seen normal by  last night at 10:00pm.   noticed when she woke up this morning at 7:30am she was not moving left side.  Patient's blood glucose was greater than 600.  Patient taken to CT scan and right m1 occlusion seen.  Not interventional candidate due to significant ischemic changes. Etiology ESUS.    Antithrombotics for secondary stroke prevention: Antiplatelets: Aspirin: 81 mg daily (may be held if on narinder crani watch)    Statins for secondary stroke prevention and hyperlipidemia, if present:   Statins: Atorvastatin- 40 mg daily    Aggressive risk factor modification: HTN, DM, HLD, Diet     Rehab efforts: PT/OT/SLP to evaluate and treat - pending stability     Diagnostics ordered/pending: CT head as needed     VTE prophylaxis: Heparin 5000 units SQ every 8 hours (can be held for narinder crani watch)    BP parameters: stroke with hemorrhagic conversion 160-180 systolic

## 2019-04-08 NOTE — CONSULTS
Inpatient consult to Physical Medicine Rehab  Consult performed by: Janeth Beltran NP  Consult ordered by: Jerry Harper MD  Reason for consult: Assess rehab needs      Patient is too acute at this time; rehab potential cannot be appropriately assessed.  Recommend early mobility, OOB in chair, and PT/OT/SLP when appropriate.  Will follow for additional rehab needs and post-acute recommendation when patient is medically stable and able to participate with therapy.    Thank you for consult.    HU Walls, FNP-C  Physical Medicine & Rehabilitation   04/07/2019

## 2019-04-08 NOTE — ASSESSMENT & PLAN NOTE
79 y F with severe dementia, HTN, and DM presents with L hemiplegia, found to have severe R hemispheric stroke, NIH stroke scale 32. After intubation, R radial art line was attempted at 1430, but unsuccessful. She then developed progressive cold and mottling of her R hand. At the time of consultation, her fingers were contracted. Her imaging findings of an occluded distal R axillary artery with large collaterals distal to it indicate that she likely had a chronic occlusion of the artery with monophasic flow to her hand that was interrupted after arterial line attempt.    She currently has Phu Class III ischemia of her R hand. Given the contracture of her fingers, vascular intervention is not indicated as it would not restore function to her hand. Also, given her age, dementia, and massive evolving stroke, recommend goals of care discussion with  and palliative consult. If she were to recover from this event, she would likely need an orthopedic consult for evaluation for RUE amputation.    YESSICA Carter

## 2019-04-08 NOTE — SUBJECTIVE & OBJECTIVE
Medications Prior to Admission   Medication Sig Dispense Refill Last Dose    aspirin 81 MG Chew Take 1 tablet (81 mg total) by mouth once daily. 90 tablet 3 2019    benazepril-hydrochlorthiazide (LOTENSIN HCT) 20-25 mg Tab Take 1 tablet by mouth once daily.   2019    blood sugar diagnostic, drum (ACCU-CHEK COMPACT TEST) Strp  Strip In Vitro Twice a day    2019    donepezil (ARICEPT) 10 MG tablet TAKE 1 TABLET BY MOUTH EVERY DAY 90 tablet 6 2019    donepezil (ARICEPT) 10 MG tablet Take 1 tablet (10 mg total) by mouth once daily. 90 tablet 3 2019    glimepiride (AMARYL) 2 MG tablet Take 2 mg by mouth before breakfast.   2019    meloxicam (MOBIC) 15 MG tablet Take 1 tablet (15 mg total) by mouth once daily. 30 tablet 0 2019    MULTIVITAMIN ORAL Take by mouth. 1 Tablet Oral Every other day   2019    simvastatin (ZOCOR) 40 MG tablet Take 40 mg by mouth. 1 Tablet Oral Every day   2019    thiamine 50 MG tablet Take 1 tablet (50 mg total) by mouth once daily. 90 tablet 3 2019       Review of patient's allergies indicates:  No Known Allergies    Past Medical History:   Diagnosis Date    Breast cancer     Diabetes mellitus     Hyperlipidemia     Hypertension     Mild non proliferative diabetic retinopathy 10/13/2014    Nuclear sclerosis 10/13/2014    Open angle with borderline findings and low glaucoma risk in both eyes 10/13/2014    Osteoarthritis of right hip 2015     Past Surgical History:   Procedure Laterality Date    BREAST LUMPECTOMY       Family History     Problem Relation (Age of Onset)    Cancer Maternal Aunt    No Known Problems Mother, Father, Sister, Brother, Maternal Uncle, Paternal Aunt, Paternal Uncle, Maternal Grandmother, Maternal Grandfather, Paternal Grandmother, Paternal Grandfather        Tobacco Use    Smoking status: Former Smoker     Types: Cigarettes     Last attempt to quit: 1962     Years since quittin.7     Smokeless tobacco: Never Used   Substance and Sexual Activity    Alcohol use: Yes     Alcohol/week: 0.0 oz     Comment: former heavy use of wine? difficult to assess    Drug use: No    Sexual activity: Yes     Partners: Male     Birth control/protection: Post-menopausal     Review of Systems   Unable to perform ROS: Intubated     Objective:     Weight: 64.5 kg (142 lb 3.2 oz)  Body mass index is 23.66 kg/m².  Vital Signs (Most Recent):  Temp: 97.8 °F (36.6 °C) (04/07/19 2305)  Pulse: 95 (04/08/19 0400)  Resp: 15 (04/08/19 0400)  BP: (!) 149/64 (04/08/19 0205)  SpO2: 100 % (04/08/19 0400) Vital Signs (24h Range):  Temp:  [97.5 °F (36.4 °C)-97.8 °F (36.6 °C)] 97.8 °F (36.6 °C)  Pulse:  [] 95  Resp:  [15-31] 15  SpO2:  [97 %-100 %] 100 %  BP: ()/(48-91) 149/64  Arterial Line BP: ()/() 124/104                Vent Mode: A/C  Oxygen Concentration (%):  [] 41  Resp Rate Total:  [14 br/min-19 br/min] 14 br/min  Vt Set:  [450 mL] 450 mL  PEEP/CPAP:  [5 cmH20] 5 cmH20  Pressure Support:  [0 cmH20] 0 cmH20  Mean Airway Pressure:  [8.2 cmH20-10 cmH20] 8.5 cmH20         NG/OG Tube 04/07/19 1024 orogastric 18 Fr. Center mouth (Active)            Urethral Catheter 04/07/19 2030 (Active)   Output (mL) 17 mL 4/8/2019  1:05 AM       Neurosurgery Physical Exam    E1VTM4  Intubated.  PERRL, resists eye opening.  +cough, gag  Withdraws x4  Spontaneous movement R side.    Significant Labs:  Recent Labs   Lab 04/07/19  1420 04/07/19  1846 04/08/19  0106   * 576* 122*   * 162* 166*   K 6.4* 4.1 3.4*   * 124* 130*   CO2 11* 19* 23   BUN 90* 82* 70*   CREATININE 3.5* 2.9* 2.1*   CALCIUM 9.9 9.6 9.7   MG  --   --  3.1*     Recent Labs   Lab 04/07/19  0945 04/08/19  0106   WBC 17.82* 21.40*   HGB 14.6 12.3   HCT 51.6* 40.5    224     Recent Labs   Lab 04/07/19  0945   INR 1.1     Microbiology Results (last 7 days)     Procedure Component Value Units Date/Time    Urine culture  [[id number]] Collected:  04/07/19 4391    Order Status:  No result Specimen:  Urine Updated:  04/07/19 8681        All pertinent labs from the last 24 hours have been reviewed.    Significant Diagnostics:  I have reviewed and interpreted all pertinent imaging results/findings within the past 24 hours.

## 2019-04-08 NOTE — PLAN OF CARE
RUE radial pulse weak on exam. A-line placed emergently in ED, but undetectable waveform after single-stick placement. Previous puncture at site prior to procedure. Due to absent waveform, line pulled, pressure held and line successfully placed with good wave form on LUE with single-stick placement. Arterial U/S ordered to evaluate RUE.

## 2019-04-08 NOTE — CARE UPDATE
Pt transported to MRI via bed with RN X2 and RT X1.  Patient tolerated well.  placed on MRI table, scan completed.  Patient then returned to room, placed on in room monitoring.  Patient now resting comfortably in bed. Will continue to monitor.

## 2019-04-08 NOTE — PLAN OF CARE
Problem: Occupational Therapy Goal  Goal: Occupational Therapy Goal  OT evaluation completed.  LUCIANA Sharp  4/8/2019

## 2019-04-08 NOTE — HOSPITAL COURSE
4/8/19 - currently on hypertonics. Large R MCA/PCA with hemorrhagic conversion. Also with ischemic right hand without plans for vascular surgery intervention   4/9/19 - pupillary change noted - scan overnight showing worsening midline shift, mannitol given today. Discussed prognosis with family.

## 2019-04-08 NOTE — PROGRESS NOTES
[facility]  Vascular Neurology  Comprehensive Stroke Center  Progress Note    Assessment/Plan:     * Embolic stroke involving right middle cerebral artery  Patient is a 78 year old female with medical history of severe dementia, HTN, HLD who presented to the ED with left sided weakness and right gaze preference.  Patient was last seen normal by  last night at 10:00pm.   noticed when she woke up this morning at 7:30am she was not moving left side.  Patient's blood glucose was greater than 600.  Patient taken to CT scan and right m1 occlusion seen.  Not interventional candidate due to significant ischemic changes. Etiology ESUS.    Antithrombotics for secondary stroke prevention: Antiplatelets: Aspirin: 81 mg daily (may be held if on narinder crani watch)    Statins for secondary stroke prevention and hyperlipidemia, if present:   Statins: Atorvastatin- 40 mg daily    Aggressive risk factor modification: HTN, DM, HLD, Diet     Rehab efforts: PT/OT/SLP to evaluate and treat - pending stability     Diagnostics ordered/pending: CT head as needed     VTE prophylaxis: Heparin 5000 units SQ every 8 hours (can be held for narinder crani watch)    BP parameters: stroke with hemorrhagic conversion 160-180 systolic         HLD (hyperlipidemia)   - atorvastatin 40 mg     Leukocytosis  Management per NCC   17 -> 21   Source - likely urine     Electrolyte abnormality  Hypokalemia  Hypernatremia   Management per primary     Acute respiratory failure with hypoxia  Intubated     Ischemia of hand  Vascular surgery evaluated patient and will not operate at this time   Nitro and warming on hand     Goals of care, counseling/discussion  At baseline patient is elderly with severe dementia (per ).  Now with a large acute ischemic stroke having a meaningful recovery is not impossible but less likely.  As glucose abnormalities are corrected by the NCC team the next 48-72 hours will be critical to see  the extent of cerebral edema as a result of brain tissue damage.  Physical examination and serial CT scans will help diagnose compression of the brain structure and brain stem.  Patient is at high risk for mortality due to stroke and if patient is able to survive this periods we will be able to assess disabilities and quality of life as a result of stroke.               Cytotoxic cerebral edema  Large area of cytotoxic cerebral edema identified when reviewing brain imaging in the territory of the right middle cerebral artery. There is not mass effect associated with it. We will continue to monitor the patients clinical exam for any worsening of symptoms which may indicate expansion of the stroke or the area of the edema resulting in the clinical change. The pattern is suggestive of embolic etiology.      Acquired hypothyroidism  On levothyroxine at home     Stage 5 chronic kidney disease not on chronic dialysis  Risk factor for stroke   Can contact nephrology as needed       Type 2 diabetes mellitus with neurologic complication  Risk factor for stroke  Insulin gtt,NCC managing   A1c 11.4     Essential hypertension  Risk factor for stroke  SBP goal 160-180         4/8/19 - currently on hypertonics. Large R MCA/PCA with hemorrhagic conversion. Also with ischemic right hand without plans for vascular surgery intervention     STROKE DOCUMENTATION        NIH Scale:  1a. Level of Consciousness: 3-->Responds only with reflex motor or autonomic effects or totally unresponsive, flaccid, and areflexic  1b. LOC Questions: 2-->Answers neither question correctly  1c. LOC Commands: 2-->Performs neither task correctly  2. Best Gaze: 2-->Forced deviation, or total gaze paresis not overcome by the oculocephalic maneuver  3. Visual: 2-->Complete hemianopia  4. Facial Palsy: 0-->Normal symmetrical movements  5a. Motor Arm, Left: 3-->No effort against gravity, limb falls  5b. Motor Arm, Right: 3-->No effort against gravity, limb  falls  6a. Motor Leg, Left: 4-->No movement  6b. Motor Leg, Right: 3-->No effort against gravity, leg falls to bed immediately  7. Limb Ataxia: 0-->Absent  8. Sensory: 0-->Normal, no sensory loss  9. Best Language: 2-->Severe aphasia, all communication is through fragmentary expression, great need for inference, questioning, and guessing by the listener. Range of information that can be exchanged is limited, listener carries burden of. . . (see row details)  10. Dysarthria: (UN) Intubated or other physical barrier  11. Extinction and Inattention (formerly Neglect): 0-->No abnormality  Total (NIH Stroke Scale): 26       Modified [place] Score: (P) 3  [place] Coma Scale:    ABCD2 Score:    [id number] Score:   HAS -BLED Score:   ICH Score:   Hunt & Bray Classification:      Hemorrhagic change of an Ischemic Stroke: Does this patient have an ischemic stroke with hemorrhagic changes? Yes, Grading Scale: PH Type 1 (PH-1) = hematoma in < 30% of the infarcted area with some slight space-occupying effect. Is this a symptomatic change?  No - Hemorrhage is not clinically significant     Neurologic Chief Complaint: R MCA/PCA stroke     Subjective:     Interval History: Patient is seen for follow-up neurological assessment and treatment recommendations:    currently on hypertonics. Large R MCA/PCA with hemorrhagic conversion.   Also with ischemic right hand without plans for vascular surgery intervention     HPI, Past Medical, Family, and Social History remains the same as documented in the initial encounter.     Review of Systems   Constitutional: Negative for fever.   Eyes: Positive for visual disturbance.   Skin: Positive for color change.   Neurological: Positive for weakness.   Psychiatric/Behavioral: Negative for agitation and behavioral problems.     Scheduled Meds:   atorvastatin  40 mg Per OG tube Daily    chlorhexidine  15 mL Mouth/Throat BID    famotidine  20 mg Per OG tube Daily    nitroGLYCERIN 2% TD oint  0.5  inch Topical (Top) Q6H    potassium chloride 10%  40 mEq Per NG tube Q2H    potassium, sodium phosphates  2 packet Per NG tube Q4H     Continuous Infusions:   dextrose 5 % and 0.45 % NaCl 50 mL/hr at 04/08/19 1605    insulin (HUMAN R) infusion (adults) 1.17 Units/hr (04/08/19 1605)    nicardipine 2.5 mg/hr (04/08/19 1605)     PRN Meds:dextrose 50%, dextrose 50%, sodium chloride 0.9%    Objective:     Vital Signs (Most Recent):  Temp: 98.7 °F (37.1 °C) (04/08/19 1505)  Pulse: 98 (04/08/19 1700)  Resp: (!) 31 (04/08/19 1605)  BP: 139/64 (04/08/19 1605)  SpO2: 99 % (04/08/19 1700)  BP Location: Left arm    Vital Signs Range (Last 24H):  Temp:  [97.5 °F (36.4 °C)-98.7 °F (37.1 °C)]   Pulse:  []   Resp:  [14-31]   BP: (117-149)/(51-89)   SpO2:  [97 %-100 %]   Arterial Line BP: ()/()   BP Location: Left arm    Physical Exam   Constitutional: She appears well-developed and well-nourished.   HENT:   Head: Normocephalic and atraumatic.   Eyes:   Fixed gaze right   Cardiovascular: Normal rate.   Skin:   mottling of right hand. Pulseless at wrist    Nursing note and vitals reviewed.      Neurological Exam:   LOC: comatose  Attention Span: poor  Language: Intubated  Orientation: Untestable due to intubation  Visual Fields: does not blink to threat   EOM (CN III, IV, VI): gaze pref right   Pupils (CN II, III): PERRL  Facial Movement (CN VII): Unable to test   Motor: Arm left  Plegia 0/5  Leg left  Plegia 0/5  Arm right  Plegia 0/5  Leg right Plegia 2/5  Cebellar: unable to test   Sensation: Josh-anesthesia left  Tone: Flaccid  LUE , LLE and RUE    Laboratory:  CMP:   Recent Labs   Lab 04/08/19  1211  04/08/19  1610   CALCIUM 9.3  --   --    ALBUMIN 2.8*  --   --    PROT 6.4  --   --    *   < > 158*   K 3.0*  --   --    CO2 22*  --   --    *  --   --    BUN 50*  --   --    CREATININE 1.5*  --   --    ALKPHOS 83  --   --    ALT 16  --   --    AST 76*  --   --    BILITOT 0.7  --   --     < > =  values in this interval not displayed.     CBC:   Recent Labs   Lab 04/08/19  0106   WBC 21.40*   RBC 5.46*   HGB 12.3   HCT 40.5      MCV 74*   MCH 22.5*   MCHC 30.4*     Lipid Panel:   Recent Labs   Lab 04/07/19  0945   CHOL 290*   LDLCALC 192.2*   HDL 49   TRIG 244*     Coagulation:   Recent Labs   Lab 04/07/19  0945   INR 1.1     Platelet Aggregation Study: No results for input(s): PLTAGG, PLTAGINTERP, PLTAGREGLACO, ADPPLTAGGREG in the last 168 hours.  Hgb A1C:   Recent Labs   Lab 04/07/19 0945   HGBA1C 11.4*     TSH:   Recent Labs   Lab 04/07/19 0945   TSH 2.690       Diagnostic Results     Brain Imaging   4/7/19 - CTA mp  1. Large right MCA distribution acute infarct.  No intracranial hemorrhage.  2. Occluded right MCA at its origin.  3. Short segment of approximately 70-80% stenosis involving the distal common carotid artery.  4. 50% stenosis at the origin of the right ICA.  5. Focal area of suspected high-grade stenosis at the distal right PICA.  Preliminary findings discussed with Dr. Bauman at the time of image interpretation.    4/8/19 - MRI brain   1. Large acute right MCA and PCA distribution infarct with hemorrhagic conversion.  Significant edema and mass effect on the right cerebral hemisphere with compression of the right lateral ventricle and 6 mm leftward midline shift.  2. Several additional scattered punctate areas of acute infarction throughout the left cerebral hemisphere consistent with embolic infarcts.  3. Enlarged temporal horn of the left lateral ventricle worrisome for hydrocephalus.  4. Loss of right MCA flow void consistent with occlusion identified on prior CTA.    Echo - 4/8/19  · Technically difficult study on a mechanically ventilated patient  · Normal left ventricular systolic function. The estimated ejection fraction is 65%  · Normal right ventricular systolic function.  · Normal LV diastolic function.  · No wall motion abnormalities.  · The IVC is smaller than would  typically be seen with positive pressure ventilation, suggesting possible central hypovolemia.  · Normal LA       FARA Nolan  [facility]  Department of Vascular Neurology   [facility]

## 2019-04-08 NOTE — PROGRESS NOTES
Patient arrived to [facility] from ED    Type of stroke/diagnosis: R MCA      TPA start and end time (if applicable): N/A    Thrombectomy start and end time (if applicable): N/A    Current symptoms: AMS, R gaze preference     Skin assessment done: Yes  Wounds noted: None    NCC notified: Dr. Harper

## 2019-04-08 NOTE — PT/OT/SLP PROGRESS
Physical Therapy      Patient Name:  Sherita Mahmood   MRN:  [id number]    PT consult received and acknowledged.  The patient is currently intubated and appropriate for only one therapy discipline at this time.  OT will follow this patient while they are only appropriate for in bed activities and will notify PT when the patient is ready for mobilization.  PT will follow up when patient is appropriate for EOB or OOB activities.     Antonieta Geller, PT  4/8/2019  [phone] (pager)

## 2019-04-08 NOTE — PROGRESS NOTES
[facility]  Neurocritical Care  Progress Note    Admit Date: 4/7/2019  Service Date: 04/08/2019  Length of Stay: 1    Subjective:     Chief Complaint: Embolic stroke involving right middle cerebral artery    History of Present Illness: Ms. Mahmood is 78 y.o AA female with PMHx of HTN (ran out medications), DMTII (on metformin 500 mg BID), HLD (on Atorvastatin 20 mg), Hypothyroidisms(on Levothyroxine 25 mg daily) and Dementia (started 112 years ago), who was BIB EMS due to L side weakness and Gaze deviation to R. Patient was LKN yesterday around 10 pm. Her Her  reports that he found her unresponsive around 7:30 AM this morning (although he is not exactly sure that he correctly remembers the time). She had urinated on herself during the night. EMS arrived shortly after 9 AM. Per  also, patient has been very tired and she rather wants to be at her bed for the last 2 weeks.  denies fevers, chills, cough, chest pain, sob, rhinorrhea, headache, abdominal pain or changes in BM/urinary habits, other than constipation. Patient ran out of BP medications for unknown period of time. She was only taking Atorvastatin, Metformin and Levothyroxine at home. Patient does not have history of smoker or stroke in the past. She was diagnosed with dementia 1 1/2 years ago, but she was oriented to herself, walks and talks at baseline and able to bathe, dress and feed herself.    Upon arrival to ED patient was found with R gaze deviation, LUE/LLE flaccid paralysis, and RUE/RLE able to slowly do some spontaneous movements. Patient was not verbal and no able to follow commands. Patient was stroke activated and CTH reported R MCA stroke and occlusion. Patient was out of tPA window and non-candidate for thrombectomy.   History was taken by chart review and 's interview      Hospital Course: 3/8: Mri with right MCA CVA,  updated at bedside, trend lactate, hemicrani watch      Review of  Symptoms:   Unable to obtain, intubated, neuro-exam    Physical Exam:  GA: comfortable, no acute distress.   HEENT: No scleral icterus or JVD.   Pulmonary: Clear to auscultation A/L. No wheezing, crackles, or rhonchi. intubated  Cardiac: RRR S1 & S2 w/o rubs/murmurs/gallops.   Abdominal: Bowel sounds present x 4. No appreciable hepatosplenomegaly.  Skin: No jaundice, rashes, or visible lesions. Right hand mottling and 5x digits purple and cold, no radial pulses palpatable or dopplerable  Pulses: 1+ Dp bilat    Neuro:  --sedation: none  --GCS: [id number]  --Mental Status:  Coma, no commands  --CN II-XII grossly intact.   --Pupils 3-->2mm, PERRL.   --brainstem: intact  --Motor: flaccid uppers and lowers  --sensory: see motor  --Reflexes: not tested  --Gait: deferred  --right hand ischemia    Recent Labs   Lab 04/08/19  0106   WBC 21.40*   RBC 5.46*   HGB 12.3   HCT 40.5      MCV 74*   MCH 22.5*   MCHC 30.4*     Recent Labs   Lab 04/08/19  1211 04/08/19  1400   CALCIUM 9.3  --    PROT 6.4  --    * 159*   K 3.0*  --    CO2 22*  --    *  --    BUN 50*  --    CREATININE 1.5*  --    ALKPHOS 83  --    ALT 16  --    AST 76*  --    BILITOT 0.7  --      No results for input(s): PT, INR, APTT in the last 24 hours.  Vent Mode: A/C  Oxygen Concentration (%):  [] 40  Resp Rate Total:  [14 br/min-31 br/min] 28 br/min  Vt Set:  [420 mL-450 mL] 420 mL  PEEP/CPAP:  [5 cmH20] 5 cmH20  Mean Airway Pressure:  [7.8 viM88-74 cmH20] 11 cmH20    Temp: 98.7 °F (37.1 °C)  Pulse: 97  Rhythm: normal sinus rhythm  BP: 139/64  MAP (mmHg): 92  Resp: (!) 31  SpO2: 100 %  Oxygen Concentration (%): 40  O2 Device (Oxygen Therapy): ventilator  Vent Mode: A/C  Set Rate: 14 bmp  Vt Set: 420 mL  PEEP/CPAP: 5 cmH20  Peak Airway Pressure: 20 cmH2O  Mean Airway Pressure: 11 cmH20  Plateau Pressure: 0 cmH20    Temp  Min: 97.5 °F (36.4 °C)  Max: 98.7 °F (37.1 °C)  Pulse  Min: 86  Max: 100  BP  Min: 117/75  Max: 149/64  MAP (mmHg)  Min: 88   Max: 110  Resp  Min: 14  Max: 31  SpO2  Min: 97 %  Max: 100 %  Oxygen Concentration (%)  Min: 40  Max: 102     0701 -  0700  In: 3982.9 [I.V.:2462.9]  Out: 954 [Urine:954]   Unmeasured Output  Stool Occurrence: 0    Nutrition Prescription Ordered  Current Diet Order: NPO  Last Bowel Movement: 19    Body mass index is 23.66 kg/m².      I have personally reviewed all labs, imaging, and studies today        Assessment/Plan:     Neuro  * Embolic stroke involving right middle cerebral artery  Right MCA CVA   updated and MRI reviewed with him at bedside  hemicrani watch  PT  OT  VN following      Bairon coma scale total score 3-8  Due to stroke  neurochecks    Brain compression  Due to stroke  See Stroke  Monitor Na  NSGY aware  hemicrani watch    Cytotoxic cerebral edema  Due to stroke  Na goal 150-160  titrate fluids as needed  Wean off Hypotonic fluid as able    Pulmonary  Acute respiratory failure with hypoxia  No vent weaning for now  Daily abg  Daily cxr  Vent Mode: A/C  Oxygen Concentration (%):  [] 40  Resp Rate Total:  [14 br/min-31 br/min] 30 br/min  Vt Set:  [420 mL-450 mL] 420 mL  PEEP/CPAP:  [5 cmH20] 5 cmH20  Mean Airway Pressure:  [7.8 ioO77-57 cmH20] 11 cmH20   Discussed with  may need trach and peg in future      Cardiac/Vascular  Ischemia of hand  Consulted vascular surgery  Per  hand was normal day before admit  Will follow up with VS for plan  Nitropaste for now    Essential hypertension  Goal < 160  hemicrani watch    Renal/  Stage 5 chronic kidney disease not on chronic dialysis  Consult nephrology  HD versus CRRT ( would need line)    Endocrine  Acquired hypothyroidism  Continue on home levothyroxine 25 mcg daily  TSH: 2.69    Type 2 diabetes mellitus with neurologic complication  Patient with Hx of DMTII, home med Metformin (hold it)  A1c: 11.4, K: 5.9, B, B-OH 6.4 on DKA (likely 2/2 insulin deficiency)  On insulin drip, IVF maintenance and bolus    BG Q/4hour  Hypoglycemia protocol ordered    Other  Limitation of activities due to disability  PT  OT          The patient is being Prophylaxed for:  Venous Thromboembolism with: Chemical  Stress Ulcer with: H2B  Ventilator Pneumonia with: chlorhexidine oral care    Activity Orders          Diet NPO: NPO starting at 04/07 1024        Full Code    Rob Oconnell MD  Neurocritical Care  [facility]    Cc time 54 minutes  Critical Care was time spent personally by me on the following activities: development of treatment plan with patient or surrogate and bedside caregivers, discussions with consultants, evaluation of patient's response to treatment, examination of patient, ordering and performing treatments and interventions, ordering and review of laboratory studies, ordering and review of radiographic studies, pulse oximetry, re-evaluation of patient's condition. This critical care time did not overlap with that of any other provider or involve time for any procedures.

## 2019-04-08 NOTE — ASSESSMENT & PLAN NOTE
Consulted vascular surgery  Per  hand was normal day before admit  Will follow up with VS for plan  Nitropaste for now

## 2019-04-08 NOTE — PLAN OF CARE
Problem: Adult Inpatient Plan of Care  Goal: Plan of Care Review  Outcome: Ongoing (interventions implemented as appropriate)  Nutrition assessment completed. Please see RD note for details.    Recommendation/Intervention:   1. If unable to extubate >72hrs, recommend starting TF.   Glucerna 1.5 @ goal rate 40mL/hr.   - Initiate @ 10mL/hr and increase by 10mL q4hrs, or as tolerated, until goal rate is reached.   - Hold for residuals >500mL.   - Provides 1440kcals, 79g protein and 729mL free water.     2. If able to extubate and advance diet, recommend Diabetic diet with texture per SLP recommendations.     RD to monitor.  Goals: Pt to receive nutrition by RD follow up  Nutrition Goal Status: new  Communication of RD Recs: reviewed with RN

## 2019-04-08 NOTE — PROGRESS NOTES
NCC JULIUS Garcia notified that pt's R hand now purple, mottled, and contracted. NP attempted to find pulses via doppler. Brachial pulse present. Radial and Ulnar pulses unable to be obtained. JULIUS Garcia to consult Vascular.     Ultrasound called r/t limb ischemia. US tech said someone will be up shortly.

## 2019-04-08 NOTE — SUBJECTIVE & OBJECTIVE
Medications Prior to Admission   Medication Sig Dispense Refill Last Dose    aspirin 81 MG Chew Take 1 tablet (81 mg total) by mouth once daily. 90 tablet 3 2019    benazepril-hydrochlorthiazide (LOTENSIN HCT) 20-25 mg Tab Take 1 tablet by mouth once daily.   2019    blood sugar diagnostic, drum (ACCU-CHEK COMPACT TEST) Strp  Strip In Vitro Twice a day    2019    donepezil (ARICEPT) 10 MG tablet TAKE 1 TABLET BY MOUTH EVERY DAY 90 tablet 6 2019    donepezil (ARICEPT) 10 MG tablet Take 1 tablet (10 mg total) by mouth once daily. 90 tablet 3 2019    glimepiride (AMARYL) 2 MG tablet Take 2 mg by mouth before breakfast.   2019    meloxicam (MOBIC) 15 MG tablet Take 1 tablet (15 mg total) by mouth once daily. 30 tablet 0 2019    MULTIVITAMIN ORAL Take by mouth. 1 Tablet Oral Every other day   2019    simvastatin (ZOCOR) 40 MG tablet Take 40 mg by mouth. 1 Tablet Oral Every day   2019    thiamine 50 MG tablet Take 1 tablet (50 mg total) by mouth once daily. 90 tablet 3 2019       Review of patient's allergies indicates:  No Known Allergies    Past Medical History:   Diagnosis Date    Breast cancer     Diabetes mellitus     Hyperlipidemia     Hypertension     Mild non proliferative diabetic retinopathy 10/13/2014    Nuclear sclerosis 10/13/2014    Open angle with borderline findings and low glaucoma risk in both eyes 10/13/2014    Osteoarthritis of right hip 2015     Past Surgical History:   Procedure Laterality Date    BREAST LUMPECTOMY       Family History     Problem Relation (Age of Onset)    Cancer Maternal Aunt    No Known Problems Mother, Father, Sister, Brother, Maternal Uncle, Paternal Aunt, Paternal Uncle, Maternal Grandmother, Maternal Grandfather, Paternal Grandmother, Paternal Grandfather        Tobacco Use    Smoking status: Former Smoker     Types: Cigarettes     Last attempt to quit: 1962     Years since quittin.7     Smokeless tobacco: Never Used   Substance and Sexual Activity    Alcohol use: Yes     Alcohol/week: 0.0 oz     Comment: former heavy use of wine? difficult to assess    Drug use: No    Sexual activity: Yes     Partners: Male     Birth control/protection: Post-menopausal     Review of Systems   All other systems reviewed and are negative.    Objective:     Vital Signs (Most Recent):  Temp: 97.5 °F (36.4 °C) (04/07/19 1905)  Pulse: 86 (04/08/19 0005)  Resp: 15 (04/08/19 0005)  BP: 117/75 (04/07/19 1840)  SpO2: 100 % (04/08/19 0005) Vital Signs (24h Range):  Temp:  [97.5 °F (36.4 °C)-97.7 °F (36.5 °C)] 97.5 °F (36.4 °C)  Pulse:  [] 86  Resp:  [15-31] 15  SpO2:  [97 %-100 %] 100 %  BP: ()/(48-91) 117/75  Arterial Line BP: ()/(57-93) 91/80     Weight: 64.5 kg (142 lb 3.2 oz)  Body mass index is 23.66 kg/m².    Physical Exam   Constitutional: She is sedated and intubated.   Cardiovascular: Normal rate and regular rhythm.   Pulmonary/Chest: She is intubated.   Musculoskeletal:   R hand cold and mottled  Fingers contracted  Absent R brachial, radial, and ulnar signals       Significant Labs:  CBC:   Recent Labs   Lab 04/07/19  0945   WBC 17.82*   RBC 6.34*   HGB 14.6   HCT 51.6*      MCV 81*   MCH 23.0*   MCHC 28.3*     CMP:   Recent Labs   Lab 04/07/19  1846   *   CALCIUM 9.6   ALBUMIN 3.2*   PROT 7.0   *   K 4.1   CO2 19*   *   BUN 82*   CREATININE 2.9*   ALKPHOS 100   ALT 16   AST 20   BILITOT 0.4       Significant Diagnostics:  I have reviewed all pertinent imaging results/findings within the past 24 hours.   CTA shows occlusion of R distal axillary artery  RUE US shows occlusion of R distal axillary, brachial, radial, and ulnar arteries with large collaterals at the level of the brachial

## 2019-04-08 NOTE — HPI
79 y F with severe dementia, HTN, and DM presents with L hemiplegia, found to have severe R hemispheric stroke, NIH stroke scale 32. Her  found her unresponsive at 0730 this morning. She was taken to the ED, and intubated at 1030. The ED attempted R radial art line placement at 1430. They were reportedly able to place the catheter into the artery but had no back bleeding. It was then removed and placed in the L radial instead. When she was transferred to the neuro ICU at 1930, she was noted to have absent R radial and ulnar pulses. Her fingers were also dusky appearing. At 2230 she was noted to have a cold, mottled R hand with muscle contractures of her fingers, as well as absent brachial, radial, and ulnar doppler signals. At this point vascular surgery was consulted    Upon review of her CTA done for her stroke, it is noted that her R axillary artery occludes distally. She is noted on RUE US to have an abrupt occlusion of the R distal axillary with collateral flow at the level of the brachial, but no reconstitution to the radial or ulnar arteries, which are occluded.

## 2019-04-08 NOTE — ASSESSMENT & PLAN NOTE
Right MCA CVA   updated and MRI reviewed with him at bedside  hemicrani watch  PT  OT  VN following

## 2019-04-08 NOTE — PLAN OF CARE
Problem: Adult Inpatient Plan of Care  Goal: Plan of Care Review  Outcome: Ongoing (interventions implemented as appropriate)  POC reviewed with pt at 0500. Family not at bedside. No acute events overnight. Pt progressing toward goals. Will continue to monitor. See flowsheets for full assessment and VS info. Pt intubated. Vent assist. Tolerating well. MRI completed. Cardene titrated to maintain SBP < 160. Malloy inserted. Insulin drip titrated q1h. Electrolytes checked. No replaced r/t kidney function.

## 2019-04-09 PROBLEM — E87.20 LACTIC ACIDOSIS: Status: ACTIVE | Noted: 2019-01-01

## 2019-04-09 PROBLEM — N17.0 ATN (ACUTE TUBULAR NECROSIS): Status: ACTIVE | Noted: 2019-01-01

## 2019-04-09 PROBLEM — E11.00 TYPE 2 DIABETES MELLITUS WITH HYPEROSMOLARITY, WITH LONG-TERM CURRENT USE OF INSULIN: Status: ACTIVE | Noted: 2019-01-01

## 2019-04-09 PROBLEM — E87.0 HYPERNATREMIA: Status: ACTIVE | Noted: 2019-01-01

## 2019-04-09 PROBLEM — R65.10 SIRS (SYSTEMIC INFLAMMATORY RESPONSE SYNDROME): Status: ACTIVE | Noted: 2019-01-01

## 2019-04-09 PROBLEM — R73.09 ELEVATED HEMOGLOBIN A1C: Status: ACTIVE | Noted: 2019-01-01

## 2019-04-09 PROBLEM — Z79.4 TYPE 2 DIABETES MELLITUS WITH HYPEROSMOLARITY, WITH LONG-TERM CURRENT USE OF INSULIN: Status: ACTIVE | Noted: 2019-01-01

## 2019-04-09 PROBLEM — I10 MALIGNANT HYPERTENSION REQUIRING ACUTE INTENSIVE MANAGEMENT: Status: ACTIVE | Noted: 2019-01-01

## 2019-04-09 PROBLEM — E86.1 HYPOVOLEMIA: Status: ACTIVE | Noted: 2019-01-01

## 2019-04-09 NOTE — PROGRESS NOTES
0730: Transported pt to CT with RNs x2, RT x1, pt on cont. cardiac monitor and portable vent. Vitals remain stable. Will continue to monitor.    0800: Returned to floor from CT with RNs x2, RT x1, pt on cont. cardiac monitor and portable vent. Vitals remain stable. Will continue to monitor.

## 2019-04-09 NOTE — PROGRESS NOTES
Noticed a difference in pupil size and reactivity during shift-change neuro exam. Per pupilometer R 3mm sluggish and L 2mm fixed. Notified Dr. Anderson and Dr. Helton, at bedside. Per orders Mannitol administered and STAT CT ordered.

## 2019-04-09 NOTE — NURSING
Homa Becerra NP; MD Leroy and MD Celestine from neurosurgery were notified about pt's mobility change in Right upper extremity. Pt no longer withdrawing on RUE. Reported pt has ongoing decreased perfusion and no pulses on right side. All aware of decreased perfusion and all assessed RUE at bedside.

## 2019-04-09 NOTE — ASSESSMENT & PLAN NOTE
Patient is a 78 year old female with Large R MCA /PCA stroke with hemorrhagic conversion and increasing edema and midline shift   Etiology ESUS.    Antithrombotics for secondary stroke prevention: Antiplatelets: Aspirin: 81 mg daily (may be held if on narinder crani watch)    Statins for secondary stroke prevention and hyperlipidemia, if present:   Statins: Atorvastatin- 40 mg daily    Aggressive risk factor modification: HTN, DM, HLD, Diet     Rehab efforts: PT/OT/SLP to evaluate and treat - pending stability     Diagnostics ordered/pending: CT head as needed     VTE prophylaxis: Heparin 5000 units SQ every 8 hours (can be held for narinder crani watch)    BP parameters: stroke with hemorrhagic conversion 160-180 systolic

## 2019-04-09 NOTE — PLAN OF CARE
Problem: Occupational Therapy Goal  Goal: Occupational Therapy Goal  Goals set 4/8 to be addressed for 14 days with expiration date, 4/22:  Patient will increase functional independence with ADLs by performing:    Patient will demonstrate rolling to the right with max assist.  Not met   Patient will demonstrate rolling to the left with max assist.   Not met  Patient will demonstrate supine -sit with max  assist.   Not met  Patient will demonstrate squat pivot transfers with max assist.   Not met  Patient will demonstrate grooming while seated with max assist.   Not met  Patient will demonstrate upper body dressing with max assist while seated EOB.   Not met  Patient will demonstrate lower body dressing with max assist while seated EOB.   Not met  Patient will demonstrate toileting with max assist.   Not met  Patient will demonstrate bathing while seated EOB with max assist.   Not met  Patient will demonstrate ability to follow 2/5 commands.   Not met  Patient's family / caregiver will demonstrate independence and safety with assisting patient with self-care skills and functional mobility.     Not met  Patient's family / caregiver will demonstrate independence with providing ROM and changes in bed positioning.   Not met       Goals remain appropriate.  LUCIANA Sharp  4/9/2019

## 2019-04-09 NOTE — SUBJECTIVE & OBJECTIVE
Intubated. MRI with R MCA and PCA stroke.       Objective:     Weight: 64.5 kg (142 lb 3.2 oz)  Body mass index is 23.66 kg/m².  Vital Signs (Most Recent):  Temp: 98.7 °F (37.1 °C) (04/08/19 1505)  Pulse: 92 (04/08/19 1921)  Resp: (!) 28 (04/08/19 1921)  BP: (!) 162/90 (04/08/19 1705)  SpO2: 100 % (04/08/19 1921) Vital Signs (24h Range):  Temp:  [97.8 °F (36.6 °C)-98.7 °F (37.1 °C)] 98.7 °F (37.1 °C)  Pulse:  [] 92  Resp:  [14-35] 28  SpO2:  [93 %-100 %] 100 %  BP: (121-162)/(51-90) 162/90  Arterial Line BP: ()/() 130/79     Date 04/08/19 0700 - 04/09/19 0659   Shift 7595-3789 4145-3636 0195-5660 24 Hour Total   INTAKE   I.V.(mL/kg) 862.2(13.4) 230.6(3.6)  1092.8(16.9)   Shift Total(mL/kg) 862.2(13.4) 230.6(3.6)  1092.8(16.9)   OUTPUT   Urine(mL/kg/hr) 680(1.3) 275  955   Shift Total(mL/kg) 680(10.5) 275(4.3)  955(14.8)   Weight (kg) 64.5 64.5 64.5 64.5              Vent Mode: A/C  Oxygen Concentration (%):  [] 41  Resp Rate Total:  [14 br/min-31 br/min] 18 br/min  Vt Set:  [420 mL-450 mL] 420 mL  PEEP/CPAP:  [5 cmH20] 5 cmH20  Mean Airway Pressure:  [7.8 dwO62-69 cmH20] 9.2 cmH20         NG/OG Tube 04/07/19 1024 orogastric 18 Fr. Center mouth (Active)            Urethral Catheter 04/07/19 2030 (Active)   Output (mL) 17 mL 4/8/2019  1:05 AM       Neurosurgery Physical Exam  [id number]  Intubated  PERRL, resists eye opening.  +cough, gag  Withdraws on R   No to minimal movement of L    Significant Labs:  Recent Labs   Lab 04/08/19  0106 04/08/19  0610  04/08/19  1211 04/08/19  1400 04/08/19  1610 04/08/19  1833   * 249*  --  150*  --   --  368*   * 161*   < > 159* 159* 158* 158*  158*   K 3.4* 3.2*  --  3.0*  --   --  3.9   * 126*  --  127*  --   --  124*   CO2 23 24  --  22*  --   --  21*   BUN 70* 61*  --  50*  --   --  43*   CREATININE 2.1* 1.8*  --  1.5*  --   --  1.4   CALCIUM 9.7 9.4  --  9.3  --   --  9.0   MG 3.1*  --   --   --   --   --   --     < > = values in this  interval not displayed.     Recent Labs   Lab 04/07/19  0945 04/07/19  0953 04/08/19  0106   WBC 17.82*  --  21.40*   HGB 14.6  --  12.3   HCT 51.6* 48 40.5     --  224     Recent Labs   Lab 04/07/19  0945   INR 1.1     Microbiology Results (last 7 days)     Procedure Component Value Units Date/Time    Urine culture [[id number]] Collected:  04/07/19 2303    Order Status:  No result Specimen:  Urine Updated:  04/07/19 2339        All pertinent labs from the last 24 hours have been reviewed.    Significant Diagnostics:  I have reviewed and interpreted all pertinent imaging results/findings within the past 24 hours.

## 2019-04-09 NOTE — ASSESSMENT & PLAN NOTE
79F PMH HTN, HLD, dementia, DM presented with R MCA and PCA infarct now with hemorrhagic conversion.     -- MRI with R MCA and PCA stroke  -- No acute neurosurgical intervention at this point   -- SBP <160  -- Hold all anticoagulation  -- HOB >30  -- Na goal 145-155  -- Recommend maximum medical management  -- Medical management per NCC  -- Please call NSGY if there any change in exam

## 2019-04-09 NOTE — SUBJECTIVE & OBJECTIVE
Neurologic Chief Complaint: R MCA/PCA stroke     Subjective:     Interval History: Patient is seen for follow-up neurological assessment and treatment recommendations:   pupillary change noted - scan overnight showing worsening midline shift, mannitol given today. Discussed prognosis with family.     HPI, Past Medical, Family, and Social History remains the same as documented in the initial encounter.     Review of Systems   Constitutional: Negative for fever.   Respiratory:        (+) Intubated    Skin: Positive for color change.   Neurological: Positive for weakness.   Psychiatric/Behavioral: Negative for agitation and behavioral problems.     Scheduled Meds:   atorvastatin  40 mg Per OG tube Daily    chlorhexidine  15 mL Mouth/Throat BID    famotidine  20 mg Per OG tube Daily    levothyroxine  25 mcg Per OG tube Before breakfast    nitroGLYCERIN 2% TD oint  0.5 inch Topical (Top) Q6H     Continuous Infusions:   sodium chloride 0.9% 75 mL/hr at 04/09/19 1705    insulin (HUMAN R) infusion (adults) 1.08 Units/hr (04/09/19 1705)    nicardipine Stopped (04/09/19 1000)     PRN Meds:dextrose 50%, dextrose 50%, sodium chloride 0.9%    Objective:     Vital Signs (Most Recent):  Temp: (!) 92.4 °F (33.6 °C) (04/09/19 1505)  Pulse: 79 (04/09/19 1752)  Resp: 14 (04/09/19 1752)  BP: (!) 173/86 (04/09/19 1705)  SpO2: 100 % (04/09/19 1752)  BP Location: Left leg    Vital Signs Range (Last 24H):  Temp:  [92.4 °F (33.6 °C)-97.9 °F (36.6 °C)]   Pulse:  []   Resp:  [0-28]   BP: (122-216)/(64-93)   SpO2:  [97 %-100 %]   Arterial Line BP: ()/()   BP Location: Left leg    Physical Exam   Constitutional: She appears well-developed and well-nourished.   HENT:   Head: Normocephalic and atraumatic.   Eyes:   Fixed gaze right  Left pupil 2 mm, right pupil 3 mm -fixed   Cardiovascular: Normal rate.   Pulmonary/Chest:   Intubated    Skin:   mottling of right hand. Pulseless at wrist    Nursing note and vitals  reviewed.      Neurological Exam:   LOC: comatose  Attention Span: poor  Language: Intubated  Orientation: Untestable due to intubation  Visual Fields: does not blink to threat   EOM (CN III, IV, VI): gaze pref right   Pupils (CN II, III): PERRL  Facial Movement (CN VII): Unable to test   Motor: Arm left  Plegia 0/5  Leg left  Plegia 0/5  Arm right  Plegia 0/5  Leg right plegia 0/5  Cebellar: unable to test   Sensation: BLE wd to pain, posturing left upper, no movement right upper   Tone: Flaccid  LUE , LLE and RUE    Laboratory:  CMP:   Recent Labs   Lab 04/09/19  1119   CALCIUM 9.0   ALBUMIN 2.3*   PROT 6.2   *   K 3.6   CO2 23   *   BUN 33*   CREATININE 1.1   ALKPHOS 92   ALT 25   *   BILITOT 0.8     CBC:   Recent Labs   Lab 04/09/19  0036   WBC 20.39*   RBC 5.06   HGB 11.5*   HCT 38.7      MCV 77*   MCH 22.7*   MCHC 29.7*     Lipid Panel:   Recent Labs   Lab 04/07/19  0945   CHOL 290*   LDLCALC 192.2*   HDL 49   TRIG 244*     Coagulation:   Recent Labs   Lab 04/07/19  0945   INR 1.1     Platelet Aggregation Study: No results for input(s): PLTAGG, PLTAGINTERP, PLTAGREGLACO, ADPPLTAGGREG in the last 168 hours.  Hgb A1C:   Recent Labs   Lab 04/07/19  0945   HGBA1C 11.4*     TSH:   Recent Labs   Lab 04/07/19  0945   TSH 2.690       Diagnostic Results     Brain Imaging   4/7/19 - CTA mp  1. Large right MCA distribution acute infarct.  No intracranial hemorrhage.  2. Occluded right MCA at its origin.  3. Short segment of approximately 70-80% stenosis involving the distal common carotid artery.  4. 50% stenosis at the origin of the right ICA.  5. Focal area of suspected high-grade stenosis at the distal right PICA.  Preliminary findings discussed with Dr. Bauman at the time of image interpretation.    4/8/19 - MRI brain   1. Large acute right MCA and PCA distribution infarct with hemorrhagic conversion.  Significant edema and mass effect on the right cerebral hemisphere with compression of  the right lateral ventricle and 6 mm leftward midline shift.  2. Several additional scattered punctate areas of acute infarction throughout the left cerebral hemisphere consistent with embolic infarcts.  3. Enlarged temporal horn of the left lateral ventricle worrisome for hydrocephalus.  4. Loss of right MCA flow void consistent with occlusion identified on prior CTA.    Echo - 4/8/19  · Technically difficult study on a mechanically ventilated patient  · Normal left ventricular systolic function. The estimated ejection fraction is 65%  · Normal right ventricular systolic function.  · Normal LV diastolic function.  · No wall motion abnormalities.  · The IVC is smaller than would typically be seen with positive pressure ventilation, suggesting possible central hypovolemia.  · Normal LA     4/9/19 - CT head   Evolving recent large right MCA and PCA distribution infarct with superimposed petechial type hemorrhage.  Significant interval increase in the extent of intracranial mass effect as above, noting approximately 1.4 cm of leftward midline shift, developing unilateral downward transtentorial herniation, and left lateral ventricular entrapment.    Punctate areas of cortical infarction the left cerebral hemisphere are better delineated on prior MRI.    Chronic microvascular ischemic change in supratentorial white matter.    No new territorial infarct identified.

## 2019-04-09 NOTE — PROGRESS NOTES
ICU Progress Note  Neurocritical Care    Admit Date: 4/7/2019  LOS: 2    CC: Embolic stroke involving right middle cerebral artery    Code Status: Full Code     SUBJECTIVE:     Interval History/Significant Events:     Occluded- right MCA -poa  Coma gcs- 6 poa  Lactic acidosis-poa  hypovolemia  Hypernatremia-poa  Type 2 dm with insulin use hyperosmolar  ATN-poa  Brain compression-poa  Cytotoxic cerebral edema-poa  Malignant hypertension recurring acute intensive management-poa  Chronic right axillary artery stenosis-poa  SIRS - poa        Medications:  Continuous Infusions:   insulin (HUMAN R) infusion (adults) 4.34 Units/hr (04/09/19 0805)    nicardipine       Scheduled Meds:   atorvastatin  40 mg Per OG tube Daily    chlorhexidine  15 mL Mouth/Throat BID    famotidine  20 mg Per OG tube Daily    mannitol 20%        mannitol 20%  65 g Intravenous Once    nitroGLYCERIN 2% TD oint  0.5 inch Topical (Top) Q6H     PRN Meds:.dextrose 50%, dextrose 50%, sodium chloride 0.9%    OBJECTIVE:   Vital Signs (Most Recent):   Temp: 97.6 °F (36.4 °C) (04/09/19 0302)  Pulse: 83 (04/09/19 0905)  Resp: 14 (04/09/19 0905)  BP: 133/78 (04/09/19 0905)  SpO2: 100 % (04/09/19 0905)    Vital Signs (24h Range):   Temp:  [97.6 °F (36.4 °C)-98.7 °F (37.1 °C)] 97.6 °F (36.4 °C)  Pulse:  [] 83  Resp:  [0-35] 14  SpO2:  [93 %-100 %] 100 %  BP: (121-216)/(51-92) 133/78  Arterial Line BP: ()/() 60/60    ICP/CPP (Last 24h):        I & O (Last 24h):     Intake/Output Summary (Last 24 hours) at 4/9/2019 0926  Last data filed at 4/9/2019 0805  Gross per 24 hour   Intake 1372.44 ml   Output 2310 ml   Net -937.56 ml     Physical Exam:  GA: Alert, comfortable, no acute distress.   HEENT: No scleral icterus or JVD.   Pulmonary: Clear to auscultation A/P/L. No wheezing, crackles, or rhonchi.  Cardiac: RRR S1 & S2 w/o rubs/murmurs/gallops.   Abdominal: Bowel sounds present x 4. No appreciable hepatosplenomegaly.  Skin: No jaundice,  rashes, or visible lesions.  Neuro:      Comatose  GCS 6 T  Pupils right 3 mm sluggish  Left pupils unreactive  Extensor posturing left side        Vent Data:   Vent Mode: A/C  Oxygen Concentration (%):  [40-41] 40  Resp Rate Total:  [14 br/min-31 br/min] 14 br/min  Vt Set:  [420 mL] 420 mL  PEEP/CPAP:  [5 cmH20] 5 cmH20  Mean Airway Pressure:  [8.1 [id number] cmH20] 8.3 cmH20    Lines/Drains/Airway:   Left scl-1       Airway - Non-Surgical 04/07/19 1023 Endotracheal Tube (Active)   Secured at 23 cm 4/9/2019  8:26 AM   Measured At Lips 4/9/2019  8:26 AM   Secured Location Left 4/9/2019  8:26 AM   Secured by Commercial tube arevalo 4/9/2019  8:26 AM   Bite Block none 4/9/2019  8:26 AM   Site Condition Cool;Dry 4/9/2019  8:26 AM   Status Intact;Secured;Patent 4/9/2019  8:26 AM   Site Assessment Clean;Dry;No bleeding;No drainage 4/9/2019  8:26 AM   Cuff Pressure 30 cm H2O 4/9/2019  8:26 AM           Percutaneous Central Line Insertion/Assessment - triple lumen  04/08/19 1747 left subclavian (Active)   Dressing biopatch in place;dressing dry and intact 4/8/2019 11:30 PM   Securement secured w/ sutures 4/8/2019 11:30 PM   Additional Site Signs no warmth;no edema;no pain;no drainage 4/8/2019 11:30 PM   Distal Patency/Care flushed w/o difficulty 4/8/2019 11:30 PM   Medial Patency/Care flushed w/o difficulty 4/8/2019 11:30 PM   Proximal Patency/Care flushed w/o difficulty 4/8/2019 11:30 PM   Dressing Change Due 04/15/19 4/8/2019 11:30 PM   Daily Line Review Performed 4/8/2019 11:30 PM             NG/OG Tube 04/07/19 1024 orogastric 18 Fr. Center mouth (Active)   Placement Check placement verified by aspirate characteristics;placement verified by distal tube length measurement 4/9/2019  3:02 AM   Tolerance no signs/symptoms of discomfort 4/9/2019  3:02 AM   Securement secured to commercial device 4/9/2019  3:02 AM   Clamp Status/Tolerance clamped 4/9/2019  3:02 AM   Suction Setting/Drainage Method dependent drainage 4/8/2019   "3:05 AM   Insertion Site Appearance no redness, warmth, tenderness, skin breakdown, drainage 4/9/2019  3:02 AM   Drainage Brown 4/8/2019  3:05 AM   Flush/Irrigation flushed w/;water 4/9/2019  3:02 AM   Intake (mL) 20 mL 4/7/2019  7:05 PM   Residual Amount (ml) 10 ml 4/8/2019  7:05 AM            Urethral Catheter 04/07/19 2030 (Active)   Site Assessment Clean;Intact 4/9/2019  3:02 AM   Collection Container Urimeter 4/9/2019  3:02 AM   Securement Method secured to top of thigh w/ adhesive device 4/9/2019  3:02 AM   Catheter Care Performed yes 4/9/2019  3:02 AM   Reason for Continuing Urinary Catheterization Critically ill in ICU requiring intensive monitoring 4/9/2019  3:02 AM   CAUTI Prevention Bundle StatLock in place w 1" slack;Intact seal between catheter & drainage tubing;Drainage bag off the floor;Green sheeting clip in use;No dependent loops or kinks;Drainage bag not overfilled (<2/3 full);Drainage bag below bladder 4/8/2019  7:02 PM   Output (mL) 315 mL 4/9/2019  8:05 AM     Nutrition/Tube Feeds (if NPO state why):  npo    Labs:  ABG: No results for input(s): PH, PO2, PCO2, HCO3, POCSATURATED, BE in the last 24 hours.  BMP:  Recent Labs   Lab 04/09/19  0036  04/09/19  0614   *  156*   < > 161*  161*   K 4.4  --  3.8   *  --  126*   CO2 22*  --  26   BUN 41*  --  37*   CREATININE 1.6*  --  1.4   *  --  298*   MG 2.2  --   --    PHOS 3.3  --   --     < > = values in this interval not displayed.     LFT:   Lab Results   Component Value Date    AST 89 (H) 04/09/2019    ALT 23 04/09/2019    ALKPHOS 95 04/09/2019    BILITOT 0.8 04/09/2019    ALBUMIN 2.5 (L) 04/09/2019    PROT 6.5 04/09/2019     CBC:   Lab Results   Component Value Date    WBC 20.39 (H) 04/09/2019    HGB 11.5 (L) 04/09/2019    HCT 38.7 04/09/2019    MCV 77 (L) 04/09/2019     04/09/2019     Microbiology x 7d:   Microbiology Results (last 7 days)     Procedure Component Value Units Date/Time    Urine culture [[id number]] " Collected:  04/07/19 2300    Order Status:  Completed Specimen:  Urine Updated:  04/09/19 0756     Urine Culture, Routine No significant growth    Narrative:       Preferred Collection Type->Urine, Clean Catch  YELLOW AND GRAY TUBE        Imaging:  cxr clear    CT scan- Evolving recent large right MCA and PCA distribution infarct with superimposed petechial type hemorrhage.  Significant interval increase in the extent of intracranial mass effect as above, noting approximately 1.4 cm of leftward midline shift, developing unilateral downward transtentorial herniation, and left lateral ventricular entrapment.    Punctate areas of cortical infarction the left cerebral hemisphere are better delineated on prior MRI.    Chronic microvascular ischemic change in supratentorial white matter.     I personally reviewed the above image.    ASSESSMENT/PLAN:     Active Hospital Problems    Diagnosis    *Embolic stroke involving right middle cerebral artery    Hypernatremia    Hypovolemia    Elevated hemoglobin A1c    Type 2 diabetes mellitus with hyperosmolarity, with long-term current use of insulin    SIRS (systemic inflammatory response syndrome)    ATN (acute tubular necrosis)    Malignant hypertension requiring acute intensive management    Lactic acidosis    Ischemia of hand    Nontraumatic cortical hemorrhage of right cerebral hemisphere    Brain compression    Bairon coma scale total score 3-8    Limitation of activities due to disability    Acute respiratory failure with hypoxia    Electrolyte abnormality    Leukocytosis    HLD (hyperlipidemia)    Stage 5 chronic kidney disease not on chronic dialysis    Acquired hypothyroidism    Cytotoxic cerebral edema    Goals of care, counseling/discussion    Essential hypertension    Type 2 diabetes mellitus with neurologic complication            Plan:  Follow CVP  Follow exam  Discussed prognosis with   Follow Na  Dc cardene  Fluid bolus  Mannitol  given  Follow abg  Follow R UE exam and L ue exam    Critical secondary to Patient has a condition that poses threat to life and bodily function: brain herniation/follow cvp/exam/na/discvussion with   Insulin gtt    Cc time 124 mins      Critical care was time spent personally by me on the following activities: development of treatment plan with patient or surrogate and bedside caregivers, discussions with consultants, evaluation of patient's response to treatment, examination of patient, ordering and performing treatments and interventions, ordering and review of laboratory studies, ordering and review of radiographic studies, pulse oximetry, re-evaluation of patient's condition. This critical care time did not overlap with that of any other provider or involve time for any procedures.

## 2019-04-09 NOTE — PLAN OF CARE
Problem: Adult Inpatient Plan of Care  Goal: Plan of Care Review  Outcome: Revised  POC reviewed with pt at 0500. Pt neurologically unable to verbalized understanding. No family at bedside. Pt has no movement on TONY since shift change. HR 80's-90's. ETT tolerated well, Sat's 95%. No BM and UO of >60ml/hr. CBGs labile throughout the shift. Insulin and D5 1/2NS adjusted per orders. Will continue to monitor. See flowsheets for full assessment and VS info

## 2019-04-09 NOTE — PROGRESS NOTES
[facility]  Vascular Neurology  Comprehensive Stroke Center  Progress Note    Assessment/Plan:     * Embolic stroke involving right middle cerebral artery  Patient is a 78 year old female with Large R MCA /PCA stroke with hemorrhagic conversion and increasing edema and midline shift   Etiology ESUS.    Antithrombotics for secondary stroke prevention: Antiplatelets: Aspirin: 81 mg daily (may be held if on narinder crani watch)    Statins for secondary stroke prevention and hyperlipidemia, if present:   Statins: Atorvastatin- 40 mg daily    Aggressive risk factor modification: HTN, DM, HLD, Diet     Rehab efforts: PT/OT/SLP to evaluate and treat - pending stability     Diagnostics ordered/pending: CT head as needed     VTE prophylaxis: Heparin 5000 units SQ every 8 hours (can be held for narinder crani watch)    BP parameters: stroke with hemorrhagic conversion 160-180 systolic         HLD (hyperlipidemia)   - atorvastatin 40 mg     Leukocytosis  Management per NCC   Today - 20    Source - urine? (although cultues showing ngtd)  Not currently on abx    Electrolyte abnormality  Hypernatremia   Management per primary     Acute respiratory failure with hypoxia  Intubated     Ischemia of hand  Vascular surgery evaluated patient and will not operate at this time   Nitro and warming on hand     Goals of care, counseling/discussion  At baseline patient is elderly with severe dementia (per ).  Now with a large acute ischemic stroke having a meaningful recovery is not impossible but less likely.  As glucose abnormalities are corrected by the NCC team the next 48-72 hours will be critical to see the extent of cerebral edema as a result of brain tissue damage.  Physical examination and serial CT scans will help diagnose compression of the brain structure and brain stem.  Patient is at high risk for mortality due to stroke and if patient is able to survive this periods we will be able to assess  disabilities and quality of life as a result of stroke.     Shared prognosis with  today, unclear via our conversation if he is comprehending the extent of her illness.               Cytotoxic cerebral edema  Large area of cytotoxic cerebral edema identified when reviewing brain imaging in the territory of the right middle cerebral artery. There is not mass effect associated with it. We will continue to monitor the patients clinical exam for any worsening of symptoms which may indicate expansion of the stroke or the area of the edema resulting in the clinical change. The pattern is suggestive of embolic etiology.      Acquired hypothyroidism  On levothyroxine at home     Stage 5 chronic kidney disease not on chronic dialysis  Risk factor for stroke   Can contact nephrology as needed   Improving but BUN /Cre still 33 /1.1    Type 2 diabetes mellitus with neurologic complication  Risk factor for stroke  Insulin gtt,NCC managing   A1c 11.4     Essential hypertension  Risk factor for stroke  SBP goal 160-180         4/8/19 - currently on hypertonics. Large R MCA/PCA with hemorrhagic conversion. Also with ischemic right hand without plans for vascular surgery intervention   4/9/19 - pupillary change noted - scan overnight showing worsening midline shift, mannitol given today. Discussed prognosis with family.     STROKE DOCUMENTATION        NIH Scale:  1a. Level of Consciousness: 3-->Responds only with reflex motor or autonomic effects or totally unresponsive, flaccid, and areflexic  1b. LOC Questions: 2-->Answers neither question correctly  1c. LOC Commands: 2-->Performs neither task correctly  2. Best Gaze: 2-->Forced deviation, or total gaze paresis not overcome by the oculocephalic maneuver  3. Visual: 3-->Bilateral hemianopia (blind including cortical blindness)  4. Facial Palsy: 0-->Normal symmetrical movements  5a. Motor Arm, Left: 3-->No effort against gravity, limb falls  5b. Motor Arm, Right: 4-->No  movement  6a. Motor Leg, Left: 3-->No effort against gravity, leg falls to bed immediately  6b. Motor Leg, Right: 4-->No movement  7. Limb Ataxia: 0-->Absent  8. Sensory: 0-->Normal, no sensory loss  9. Best Language: 0-->No aphasia, normal  10. Dysarthria: (UN) Intubated or other physical barrier  11. Extinction and Inattention (formerly Neglect): 0-->No abnormality  Total (NIH Stroke Scale): 26       Modified Brazos Score: (P) 3  Raymond Coma Scale:    ABCD2 Score:    [id number] Score:   HAS -BLED Score:   ICH Score:   Hunt & Bray Classification:      Hemorrhagic change of an Ischemic Stroke: Does this patient have an ischemic stroke with hemorrhagic changes? Yes, Grading Scale: PH Type 1 (PH-1) = hematoma in < 30% of the infarcted area with some slight space-occupying effect. Is this a symptomatic change?  No - Hemorrhage is not clinically significant     Neurologic Chief Complaint: R MCA/PCA stroke     Subjective:     Interval History: Patient is seen for follow-up neurological assessment and treatment recommendations:   pupillary change noted - scan overnight showing worsening midline shift, mannitol given today. Discussed prognosis with family.     HPI, Past Medical, Family, and Social History remains the same as documented in the initial encounter.     Review of Systems   Constitutional: Negative for fever.   Respiratory:        (+) Intubated    Skin: Positive for color change.   Neurological: Positive for weakness.   Psychiatric/Behavioral: Negative for agitation and behavioral problems.     Scheduled Meds:   atorvastatin  40 mg Per OG tube Daily    chlorhexidine  15 mL Mouth/Throat BID    famotidine  20 mg Per OG tube Daily    levothyroxine  25 mcg Per OG tube Before breakfast    nitroGLYCERIN 2% TD oint  0.5 inch Topical (Top) Q6H     Continuous Infusions:   sodium chloride 0.9% 75 mL/hr at 04/09/19 1705    insulin (HUMAN R) infusion (adults) 1.08 Units/hr (04/09/19 1705)    nicardipine Stopped  (04/09/19 1000)     PRN Meds:dextrose 50%, dextrose 50%, sodium chloride 0.9%    Objective:     Vital Signs (Most Recent):  Temp: (!) 92.4 °F (33.6 °C) (04/09/19 1505)  Pulse: 79 (04/09/19 1752)  Resp: 14 (04/09/19 1752)  BP: (!) 173/86 (04/09/19 1705)  SpO2: 100 % (04/09/19 1752)  BP Location: Left leg    Vital Signs Range (Last 24H):  Temp:  [92.4 °F (33.6 °C)-97.9 °F (36.6 °C)]   Pulse:  []   Resp:  [0-28]   BP: (122-216)/(64-93)   SpO2:  [97 %-100 %]   Arterial Line BP: ()/()   BP Location: Left leg    Physical Exam   Constitutional: She appears well-developed and well-nourished.   HENT:   Head: Normocephalic and atraumatic.   Eyes:   Fixed gaze right  Left pupil 2 mm, right pupil 3 mm -fixed   Cardiovascular: Normal rate.   Pulmonary/Chest:   Intubated    Skin:   mottling of right hand. Pulseless at wrist    Nursing note and vitals reviewed.      Neurological Exam:   LOC: comatose  Attention Span: poor  Language: Intubated  Orientation: Untestable due to intubation  Visual Fields: does not blink to threat   EOM (CN III, IV, VI): gaze pref right   Pupils (CN II, III): PERRL  Facial Movement (CN VII): Unable to test   Motor: Arm left  Plegia 0/5  Leg left  Plegia 0/5  Arm right  Plegia 0/5  Leg right plegia 0/5  Cebellar: unable to test   Sensation: BLE wd to pain, posturing left upper, no movement right upper   Tone: Flaccid  LUE , LLE and RUE    Laboratory:  CMP:   Recent Labs   Lab 04/09/19  1119   CALCIUM 9.0   ALBUMIN 2.3*   PROT 6.2   *   K 3.6   CO2 23   *   BUN 33*   CREATININE 1.1   ALKPHOS 92   ALT 25   *   BILITOT 0.8     CBC:   Recent Labs   Lab 04/09/19  0036   WBC 20.39*   RBC 5.06   HGB 11.5*   HCT 38.7      MCV 77*   MCH 22.7*   MCHC 29.7*     Lipid Panel:   Recent Labs   Lab 04/07/19  0945   CHOL 290*   LDLCALC 192.2*   HDL 49   TRIG 244*     Coagulation:   Recent Labs   Lab 04/07/19 0945   INR 1.1     Platelet Aggregation Study: No results for  input(s): PLTAGG, PLTAGINTERP, PLTAGREGLACO, ADPPLTAGGREG in the last 168 hours.  Hgb A1C:   Recent Labs   Lab 04/07/19  0945   HGBA1C 11.4*     TSH:   Recent Labs   Lab 04/07/19  0945   TSH 2.690       Diagnostic Results     Brain Imaging   4/7/19 - CTA mp  1. Large right MCA distribution acute infarct.  No intracranial hemorrhage.  2. Occluded right MCA at its origin.  3. Short segment of approximately 70-80% stenosis involving the distal common carotid artery.  4. 50% stenosis at the origin of the right ICA.  5. Focal area of suspected high-grade stenosis at the distal right PICA.  Preliminary findings discussed with Dr. Bauman at the time of image interpretation.    4/8/19 - MRI brain   1. Large acute right MCA and PCA distribution infarct with hemorrhagic conversion.  Significant edema and mass effect on the right cerebral hemisphere with compression of the right lateral ventricle and 6 mm leftward midline shift.  2. Several additional scattered punctate areas of acute infarction throughout the left cerebral hemisphere consistent with embolic infarcts.  3. Enlarged temporal horn of the left lateral ventricle worrisome for hydrocephalus.  4. Loss of right MCA flow void consistent with occlusion identified on prior CTA.    Echo - 4/8/19  · Technically difficult study on a mechanically ventilated patient  · Normal left ventricular systolic function. The estimated ejection fraction is 65%  · Normal right ventricular systolic function.  · Normal LV diastolic function.  · No wall motion abnormalities.  · The IVC is smaller than would typically be seen with positive pressure ventilation, suggesting possible central hypovolemia.  · Normal LA     4/9/19 - CT head   Evolving recent large right MCA and PCA distribution infarct with superimposed petechial type hemorrhage.  Significant interval increase in the extent of intracranial mass effect as above, noting approximately 1.4 cm of leftward midline shift, developing  unilateral downward transtentorial herniation, and left lateral ventricular entrapment.    Punctate areas of cortical infarction the left cerebral hemisphere are better delineated on prior MRI.    Chronic microvascular ischemic change in supratentorial white matter.    No new territorial infarct identified.      FARA Nolan  [facility]  Department of Vascular Neurology   [facility]

## 2019-04-09 NOTE — HOSPITAL COURSE
"4/8: Intubated. MRI with R MCA and PCA stroke.   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 Family knowledgable of worsening prognosis, is not interested in surgery at this point and would like to continue medical management.  feels they should "let nature take it's course".  "

## 2019-04-09 NOTE — PT/OT/SLP PROGRESS
Occupational Therapy   Treatment    Name: Sherita Mahmood  MRN: [id number]  Admitting Diagnosis:  Embolic stroke involving right middle cerebral artery       Recommendations:     Discharge Recommendations: (pending extubation)  Discharge Equipment Recommendations:  (pending extubation)  Barriers to discharge:  Inaccessible home environment, Decreased caregiver support    Assessment:     Sherita Mahmood is a 79 y.o. female with a medical diagnosis of Embolic stroke involving right middle cerebral artery.  She presents with performance deficits affecting function are weakness, impaired self care skills, impaired balance, decreased coordination, impaired endurance, impaired functional mobilty, impaired sensation, impaired cognition, decreased lower extremity function, impaired fine motor, impaired cardiopulmonary response to activity, impaired coordination, decreased upper extremity function, decreased safety awareness.     Rehab Prognosis:  Fair; patient would benefit from acute skilled OT services to address these deficits and reach maximum level of function.       Plan:     Patient to be seen 3 x/week to address the above listed problems via self-care/home management, neuromuscular re-education, therapeutic activities, therapeutic exercises, sensory integration, cognitive retraining  · Plan of Care Expires: 05/06/19  · Plan of Care Reviewed with: patient    Subjective     Pain/Comfort:  · Pain Rating 1: 0/10  · Pain Rating Post-Intervention 1: 0/10    Objective:     Communicated with: Nurse prior to session.  Patient found supine with arterial line, bed alarm, blood pressure cuff, central line, telemetry, SCD, pulse ox (continuous), restraints, peripheral IV, pressure relief boots, levy catheter upon OT entry to room.    General Precautions: Standard, aspiration, NPO, fall(do not touch patient's hands 2* nitro paste)   Orthopedic Precautions:N/A   Braces: N/A     Occupational Performance:     Bed Mobility:     · Patient completed Rolling/Turning to Left with  total assistance  · Patient completed Rolling/Turning to Right with total assistance     Functional Mobility/Transfers:  · Dependent drawsheet transfers    Activities of Daily Living:  · Feeding:  NPO    · Grooming: total assistance while supine    [facility] 6 Click ADL: 6    Treatment & Education:  Patient education provided on role of OT and need for continued OT upon discharge.   Continued education, patient/ family training recommended. Daily orientation provided.  PROM performed bilateral UE/LEs one set x 10 rep in all planes of motion with stretches provided at end range; assistance and facilitation provided for upward rotation of the scapula during shoulder flexion and abduction.  Patient kept eyes closed 100% of the session.  Patient unable to follow commands.  Positioning provided for midline orientation with bilateral UEs elevated and heels lifted off mattress.  Family present during the session.  White board updated in patient's room    Patient left supine with all lines intact and call button in reachEducation:      GOALS:   Multidisciplinary Problems     Occupational Therapy Goals        Problem: Occupational Therapy Goal    Goal Priority Disciplines Outcome Interventions   Occupational Therapy Goal     OT, PT/OT     Description:  Goals set 4/8 to be addressed for 14 days with expiration date, 4/22:  Patient will increase functional independence with ADLs by performing:    Patient will demonstrate rolling to the right with max assist.  Not met   Patient will demonstrate rolling to the left with max assist.   Not met  Patient will demonstrate supine -sit with max  assist.   Not met  Patient will demonstrate squat pivot transfers with max assist.   Not met  Patient will demonstrate grooming while seated with max assist.   Not met  Patient will demonstrate upper body dressing with max assist while seated EOB.   Not met  Patient will demonstrate lower body  dressing with max assist while seated EOB.   Not met  Patient will demonstrate toileting with max assist.   Not met  Patient will demonstrate bathing while seated EOB with max assist.   Not met  Patient will demonstrate ability to follow 2/5 commands.   Not met  Patient's family / caregiver will demonstrate independence and safety with assisting patient with self-care skills and functional mobility.     Not met  Patient's family / caregiver will demonstrate independence with providing ROM and changes in bed positioning.   Not met                        Time Tracking:     OT Date of Treatment: 04/09/19  OT Start Time: 0430  OT Stop Time: 0440  OT Total Time (min): 10 min    Billable Minutes:Therapeutic Exercise 10    LUCIANA Sharp  4/9/2019

## 2019-04-09 NOTE — ASSESSMENT & PLAN NOTE
Management per NCC   Today - 20    Source - urine? (although cultues showing ngtd)  Not currently on abx

## 2019-04-09 NOTE — NURSING
0210: MD Monica notified of pt's glucose increasing trend. Reported last 5 values trending up per orders will come to bedside w/ Dr Harper shortly.    0215: Monica BHAGAT and Leroy BHAGAT at bedside to assess pt. MDs notified of pt Na trends and increasing glucose trends. Background giving regarding last 5 CBGs. Current  and insulin gtt rate at 6.34. Also reported central line distal and medial lumens flushing but not drawing back blood. MD assessed central line lumens and per orders please decrease D5 1/2NS to 25ml/hr, and notify w/ next CBG and Na results. Will continue to monitor...

## 2019-04-09 NOTE — PROGRESS NOTES
[facility]  Neurosurgery  Progress Note    Subjective:     History of Present Illness: Patient is a 79 year old female with a history of DM2, HTN, HLD, dementia who presented 4/7 with R MCA and PCA stroke after being found unresponsive at home. No tPA or thrombectomy. Today on MRI discovered to have hemorrhagic conversion. No neurostatus changes. Also with ischemic R hand, vascular is consulted.     Post-Op Info:  * No surgery found *          Intubated. MRI with R MCA and PCA stroke.       Objective:     Weight: 64.5 kg (142 lb 3.2 oz)  Body mass index is 23.66 kg/m².  Vital Signs (Most Recent):  Temp: 98.7 °F (37.1 °C) (04/08/19 1505)  Pulse: 92 (04/08/19 1921)  Resp: (!) 28 (04/08/19 1921)  BP: (!) 162/90 (04/08/19 1705)  SpO2: 100 % (04/08/19 1921) Vital Signs (24h Range):  Temp:  [97.8 °F (36.6 °C)-98.7 °F (37.1 °C)] 98.7 °F (37.1 °C)  Pulse:  [] 92  Resp:  [14-35] 28  SpO2:  [93 %-100 %] 100 %  BP: (121-162)/(51-90) 162/90  Arterial Line BP: ()/() 130/79     Date 04/08/19 0700 - 04/09/19 0659   Shift 6989-9799 1901-8919 8571-0018 24 Hour Total   INTAKE   I.V.(mL/kg) 862.2(13.4) 230.6(3.6)  1092.8(16.9)   Shift Total(mL/kg) 862.2(13.4) 230.6(3.6)  1092.8(16.9)   OUTPUT   Urine(mL/kg/hr) 680(1.3) 275  955   Shift Total(mL/kg) 680(10.5) 275(4.3)  955(14.8)   Weight (kg) 64.5 64.5 64.5 64.5              Vent Mode: A/C  Oxygen Concentration (%):  [] 41  Resp Rate Total:  [14 br/min-31 br/min] 18 br/min  Vt Set:  [420 mL-450 mL] 420 mL  PEEP/CPAP:  [5 cmH20] 5 cmH20  Mean Airway Pressure:  [7.8 pgL34-98 cmH20] 9.2 cmH20         NG/OG Tube 04/07/19 1024 orogastric 18 Fr. Center mouth (Active)            Urethral Catheter 04/07/19 2030 (Active)   Output (mL) 17 mL 4/8/2019  1:05 AM       Neurosurgery Physical Exam  [id number]  Intubated  PERRL, resists eye opening.  +cough, gag  Withdraws on R   No to minimal movement of L    Significant Labs:  Recent Labs   Lab 04/08/19  0106  04/08/19  0610  04/08/19  1211 04/08/19  1400 04/08/19  1610 04/08/19  1833   * 249*  --  150*  --   --  368*   * 161*   < > 159* 159* 158* 158*  158*   K 3.4* 3.2*  --  3.0*  --   --  3.9   * 126*  --  127*  --   --  124*   CO2 23 24  --  22*  --   --  21*   BUN 70* 61*  --  50*  --   --  43*   CREATININE 2.1* 1.8*  --  1.5*  --   --  1.4   CALCIUM 9.7 9.4  --  9.3  --   --  9.0   MG 3.1*  --   --   --   --   --   --     < > = values in this interval not displayed.     Recent Labs   Lab 04/07/19  0945 04/07/19  0953 04/08/19  0106   WBC 17.82*  --  21.40*   HGB 14.6  --  12.3   HCT 51.6* 48 40.5     --  224     Recent Labs   Lab 04/07/19  0945   INR 1.1     Microbiology Results (last 7 days)     Procedure Component Value Units Date/Time    Urine culture [[id number]] Collected:  04/07/19 2303    Order Status:  No result Specimen:  Urine Updated:  04/07/19 9432        All pertinent labs from the last 24 hours have been reviewed.    Significant Diagnostics:  I have reviewed and interpreted all pertinent imaging results/findings within the past 24 hours.    Assessment/Plan:     Nontraumatic cortical hemorrhage of right cerebral hemisphere  79F PMH HTN, HLD, dementia, DM presented with R MCA and PCA infarct now with hemorrhagic conversion.     -- MRI with R MCA and PCA stroke  -- No acute neurosurgical intervention at this point   -- SBP <160  -- Hold all anticoagulation  -- HOB >30  -- Na goal 145-155  -- Recommend maximum medical management  -- Medical management per NCC  -- Please call NSGY if there any change in exam         Red Bo MD  Neurosurgery  [facility]

## 2019-04-09 NOTE — PROCEDURES
"Sherita Mahmood is a 79 y.o. female patient.    Temp: 98.7 °F (37.1 °C) (04/08/19 1505)  Pulse: 103 (04/08/19 1705)  Resp: (!) 35 (04/08/19 1705)  BP: (!) 162/90 (04/08/19 1705)  SpO2: (!) 93 % (04/08/19 1705)  Weight: 64.5 kg (142 lb 3.2 oz) (04/08/19 0305)  Height: 5' 5" (165.1 cm) (04/07/19 0940)       Central Line  Date/Time: 4/8/2019 7:07 PM  Location procedure was performed: [facility] NEURO CRITICAL CARE  Performed by: Dat Park MD  Supervising provider: Kelly  Assisting provider: Rob Oconnell MD  Pre-operative Diagnosis: Ischemic stroke  Post-operative diagnosis: Ischemic stroke  Consent Done: Yes  Time out: Immediately prior to procedure a "time out" was called to verify the correct patient, procedure, equipment, support staff and site/side marked as required.  Indications: vascular access and med administration  Preparation: skin prepped with ChloraPrep  Skin prep agent dried: skin prep agent completely dried prior to procedure  Sterile barriers: all five maximum sterile barriers used - cap, mask, sterile gown, sterile gloves, and large sterile sheet  Hand hygiene: hand hygiene performed prior to central venous catheter insertion  Location details: left subclavian  Catheter type: triple lumen  Catheter Length: 16cm    Ultrasound guidance: no  Manometry: Yes  Number of attempts: 1  Estimated blood loss (mL): 0  Post-procedure: line sutured  Complications: No          Dat Park  4/8/2019  "

## 2019-04-10 PROBLEM — Z51.5 PALLIATIVE CARE ENCOUNTER: Status: ACTIVE | Noted: 2019-01-01

## 2019-04-10 NOTE — PROGRESS NOTES
Lab called and stated plt count would not be ran due to multiple clots in blood that was sent off @ 0000. Blue tube needs to be collected. Reported to night charge Morgan and Mariusz Garcia, JULIUS.      0255 - Morgan, charge nurse @ bedside and stated spoke w/ laboratory and to send blue tube as soon as possible and lab guaranteed they would run it as soon as they get it.

## 2019-04-10 NOTE — PROGRESS NOTES
Relayed to Night charge Dat Mora NP and Mariusz Garcia NP regarding gap in frequency of pt's NA and CMP. Reported labs collected at 1920 and call was received from chemistry around 2100 that Potassium was >8. Reported day nurses had same issue w/ potassium resulting falsely high. Nurse stated lab was able to keep all results except Potassium previously during the day. Potassium redrew @ 2111. Resulted around 2200 and nurse requesting other lab values. Was told by  that once potassium result was thrown out, all other values were thrown out as well. Night nurses reported how all values were previously able to be kept except result that was falsely high.  stated protocol regarding false readings. Nurse requesting to add on CMP for last blood draw @ 2111.  reported that pt's blood is being stored but unable to run. Nurse requesting to speak w/ supervisor. Supervisor Mariusz explained protocol again, but stated would be able to run blood from 2111 being that pt's blood is stored for 2 days. Nurse placed add on orders and supervisor stated he would make sure blood was ran as soon as possible. Per team call as soon as possible w/ results.

## 2019-04-10 NOTE — PROGRESS NOTES
[facility]  Neurocritical Care  Progress Note    Admit Date: 4/7/2019  Service Date: 04/10/2019  Length of Stay: 3    Subjective:     Chief Complaint: Embolic stroke involving right middle cerebral artery    History of Present Illness: Ms. Mahmood is 78 y.o AA female with PMHx of HTN (ran out medications), DMTII (on metformin 500 mg BID), HLD (on Atorvastatin 20 mg), Hypothyroidisms(on Levothyroxine 25 mg daily) and Dementia (started 112 years ago), who was BIB EMS due to L side weakness and Gaze deviation to R. Patient was LKN yesterday around 10 pm. Her Her  reports that he found her unresponsive around 7:30 AM this morning (although he is not exactly sure that he correctly remembers the time). She had urinated on herself during the night. EMS arrived shortly after 9 AM. Per  also, patient has been very tired and she rather wants to be at her bed for the last 2 weeks.  denies fevers, chills, cough, chest pain, sob, rhinorrhea, headache, abdominal pain or changes in BM/urinary habits, other than constipation. Patient ran out of BP medications for unknown period of time. She was only taking Atorvastatin, Metformin and Levothyroxine at home. Patient does not have history of smoker or stroke in the past. She was diagnosed with dementia 1 1/2 years ago, but she was oriented to herself, walks and talks at baseline and able to bathe, dress and feed herself.    Upon arrival to ED patient was found with R gaze deviation, LUE/LLE flaccid paralysis, and RUE/RLE able to slowly do some spontaneous movements. Patient was not verbal and no able to follow commands. Patient was stroke activated and CTH reported R MCA stroke and occlusion. Patient was out of tPA window and non-candidate for thrombectomy.   History was taken by chart review and 's interview      Hospital Course: 4/8: Mri with right MCA CVA,  updated at bedside, trend lactate, hemicrani watch  04/10/2019: loss of  pupillary reaction, given mannitol     Past Medical History:   Diagnosis Date    Breast cancer 2003/1995    Diabetes mellitus     Hyperlipidemia     Hypertension     Mild non proliferative diabetic retinopathy 10/13/2014    Nuclear sclerosis 10/13/2014    Open angle with borderline findings and low glaucoma risk in both eyes 10/13/2014    Osteoarthritis of right hip 1/7/2015     Past Surgical History:   Procedure Laterality Date    BREAST LUMPECTOMY  1995      No current facility-administered medications on file prior to encounter.      Current Outpatient Medications on File Prior to Encounter   Medication Sig Dispense Refill    aspirin 81 MG Chew Take 1 tablet (81 mg total) by mouth once daily. 90 tablet 3    atorvastatin (LIPITOR) 20 MG tablet Take 20 mg by mouth once daily.      blood sugar diagnostic, drum (ACCU-CHEK COMPACT TEST) Strp  Strip In Vitro Twice a day       levothyroxine (SYNTHROID) 25 MCG tablet Take 25 mcg by mouth once daily.      metFORMIN (GLUCOPHAGE) 500 MG tablet Take 500 mg by mouth 2 (two) times daily with meals.      MULTIVITAMIN ORAL Take by mouth. 1 Tablet Oral Every other day      [DISCONTINUED] glimepiride (AMARYL) 2 MG tablet Take 2 mg by mouth before breakfast.      [DISCONTINUED] meloxicam (MOBIC) 15 MG tablet Take 1 tablet (15 mg total) by mouth once daily. 30 tablet 0    [DISCONTINUED] simvastatin (ZOCOR) 40 MG tablet Take 40 mg by mouth. 1 Tablet Oral Every day      [DISCONTINUED] thiamine 50 MG tablet Take 1 tablet (50 mg total) by mouth once daily. 90 tablet 3    benazepril-hydrochlorthiazide (LOTENSIN HCT) 20-25 mg Tab Take 1 tablet by mouth once daily.      donepezil (ARICEPT) 10 MG tablet TAKE 1 TABLET BY MOUTH EVERY DAY 90 tablet 6    [DISCONTINUED] donepezil (ARICEPT) 10 MG tablet Take 1 tablet (10 mg total) by mouth once daily. 90 tablet 3      Allergies: Patient has no known allergies.    Family History   Problem Relation Age of Onset    Cancer  Maternal Aunt     No Known Problems Mother     No Known Problems Father     No Known Problems Sister     No Known Problems Brother     No Known Problems Maternal Uncle     No Known Problems Paternal Aunt     No Known Problems Paternal Uncle     No Known Problems Maternal Grandmother     No Known Problems Maternal Grandfather     No Known Problems Paternal Grandmother     No Known Problems Paternal Grandfather     Amblyopia Neg Hx     Blindness Neg Hx     Cataracts Neg Hx     Diabetes Neg Hx     Glaucoma Neg Hx     Hypertension Neg Hx     Macular degeneration Neg Hx     Retinal detachment Neg Hx     Strabismus Neg Hx     Stroke Neg Hx     Thyroid disease Neg Hx      Social History     Tobacco Use    Smoking status: Former Smoker     Types: Cigarettes     Last attempt to quit: 1962     Years since quittin.7    Smokeless tobacco: Never Used   Substance Use Topics    Alcohol use: Yes     Alcohol/week: 0.0 oz     Comment: former heavy use of wine? difficult to assess    Drug use: No     Review of Systems   Unable to perform ROS: Intubated     Objective:     Vitals:    Temp: 98.7 °F (37.1 °C)  Pulse: 97  Rhythm: normal sinus rhythm  BP: 139/64  MAP (mmHg): 92  Resp: (!) 31  SpO2: 100 %  Oxygen Concentration (%): 40  O2 Device (Oxygen Therapy): ventilator  Vent Mode: A/C  Set Rate: 14 bmp  Vt Set: 420 mL  PEEP/CPAP: 5 cmH20  Peak Airway Pressure: 20 cmH2O  Mean Airway Pressure: 11 cmH20  Plateau Pressure: 0 cmH20    Temp  Min: 97.5 °F (36.4 °C)  Max: 98.7 °F (37.1 °C)  Pulse  Min: 86  Max: 100  BP  Min: 117/75  Max: 149/64  MAP (mmHg)  Min: 88  Max: 110  Resp  Min: 14  Max: 31  SpO2  Min: 97 %  Max: 100 %  Oxygen Concentration (%)  Min: 40  Max: 102    04/07 0701 - /08 0700  In: 3982.9 [I.V.:2462.9]  Out: 954 [Urine:954]   Unmeasured Output  Stool Occurrence: 0       Physical Exam   Constitutional: She appears well-developed.   HENT:   Head: Normocephalic and atraumatic.   Neck: Neck  supple.   Cardiovascular: Normal rate.   Pulmonary/Chest:   Mechanical BS throughout   Abdominal: Soft.   Neurological: She is unresponsive. GCS eye subscore is 1. GCS verbal subscore is 1. GCS motor subscore is 2.   Pupils fixed BL, L 3mm, R 7mm  L corneal intact, R corneal absent  Absent OC to doll's eye  Cough/gag intact  Not breathing over vent at time of exam   Skin:   Cold RUE with ischemic change to distal digits         Assessment/Plan:     Neuro  * Embolic stroke involving right middle cerebral artery  Right MCA CVA   updated and MRI reviewed with him at bedside  hemicrani consult - not a surgical candidate as per NSGY  PT  OT  VN following  Palliative care consulted for goals of care discussion      [place] coma scale total score 3-8  Due to stroke  Palliative care consulted for goals of care discussion    Brain compression  Due to stroke  See Stroke  Monitor Na  NSGY aware  hemicrani consult - not a surgical candidate    Cytotoxic cerebral edema  Due to stroke  Na goal 150-160  titrate fluids as needed    Pulmonary  Acute respiratory failure with hypoxia  No vent weaning for now  Daily abg  Daily cxr  Vent Mode: A/C  Oxygen Concentration (%):  [40-41] 41  Resp Rate Total:  [14 br/min-16 br/min] 14 br/min  Vt Set:  [420 mL] 420 mL  PEEP/CPAP:  [5 cmH20] 5 cmH20  Mean Airway Pressure:  [8.1 cmH20-8.6 cmH20] 8.2 cmH20       Cardiac/Vascular  Ischemia of hand  Consulted vascular surgery - no surgical intervention at this time.  Continue neuropaste and hot packs  Arterial line attempted while MAPs in 40's in emergent situation due to significant hypOtension. See previous documentation.     Essential hypertension  Goal 100-160      Renal/  Stage 5 chronic kidney disease not on chronic dialysis  Much improved     Endocrine  Acquired hypothyroidism  Continue on home levothyroxine 25 mcg daily      Type 2 diabetes mellitus with neurologic complication  Patient with Hx of DMTII, home med Metformin (hold  it)  A1c: 11.4, K: 5.9, B, B-OH 6.4 on DKA (likely 2/2 insulin deficiency)  On insulin drip, IVF maintenance and bolus   BG Q/4hour  Hypoglycemia protocol ordered    Other  Limitation of activities due to disability  PT  OT          The patient is being Prophylaxed for:  Venous Thromboembolism with: Mechanical  Stress Ulcer with: H2B  Ventilator Pneumonia with: chlorhexidine oral care    Activity Orders          Diet NPO: NPO starting at  1024        Full Code    Jerry Harper MD  Neurocritical Care  [facility]

## 2019-04-10 NOTE — NURSING
Pt's temperature 91.9 degrees per Mariusz Garcia NP if temp drops to 91.8 degrees place pt on bear hugger to a goal of 94 degrees.

## 2019-04-10 NOTE — ASSESSMENT & PLAN NOTE
79F PMH HTN, HLD, dementia, DM presented with R MCA and PCA infarct now with hemorrhagic conversion.     -- MRI with worsening edema  -- No acute neurosurgical intervention at this point   -- Maximum medical therapy per ICU; exam worsening  -- Family expressed that they have no desire for surgery today (from the ). If medical therapy alone does not improve symptoms  -- Will sign off a this time, please call with questions.

## 2019-04-10 NOTE — ASSESSMENT & PLAN NOTE
Consulted vascular surgery - no surgical intervention at this time.  Continue neuropaste and hot packs  Arterial line attempted while MAPs in 40's in emergent situation due to significant hypOtension. See previous documentation.

## 2019-04-10 NOTE — HPI
Ms. Mahmood is 78 y.o lady with PMHx of HTN (ran out medications), DMTII (on metformin 500 mg BID), HLD (on Atorvastatin 20 mg), Hypothyroidisms(on Levothyroxine 25 mg daily) and Dementia (started 1.5  years ago). Presented to [facility] via EMS with c/o  L side weakness and Gaze deviation to R.     As reported by  was last known in normal state on 4/6/19 around 10 pm.   states  he found her unresponsive around 7:30 AM this morning (although he is not exactly sure that he correctly remembers the time). She had urinated on herself during the night. EMS arrived shortly after 9 AM. Per  also, patient has been very tired and she rather wants to be at her bed for the last 2 weeks.      denies fevers, chills, cough, chest pain, sob, rhinorrhea, headache, abdominal pain or changes in BM/urinary habits, other than constipation. Patient ran out of BP medications for unknown period of time. She was only taking Atorvastatin, Metformin and Levothyroxine at home. Patient does not have history of smoker or stroke in the past. She was diagnosed with dementia 1.5 years ago, but she was oriented to herself, walks and talks at baseline and able to bathe, dress and feed herself.      Upon arrival to ED patient was found with R gaze deviation, LUE/LLE flaccid paralysis, and RUE/RLE able to slowly do some spontaneous movements. Patient was not verbal and no able to follow commands. Patient was stroke activated and CTH reported R MCA stroke and occlusion. Patient was out of tPA window and non-candidate for thrombectomy.

## 2019-04-10 NOTE — SUBJECTIVE & OBJECTIVE
"Interval History: 4/9: exam remains very poor; known cold right upper extremity due to vascular malformation. Family knowledgable of worsening prognosis, is not interested in surgery at this point and would like to continue medical management.  feels they should "let nature take it's course".    Medications:  Continuous Infusions:   sodium chloride 0.9% 75 mL/hr at 04/09/19 1805    insulin (HUMAN R) infusion (adults) 1.48 Units/hr (04/09/19 1805)    nicardipine Stopped (04/09/19 1000)     Scheduled Meds:   atorvastatin  40 mg Per OG tube Daily    chlorhexidine  15 mL Mouth/Throat BID    famotidine  20 mg Per OG tube Daily    levothyroxine  25 mcg Per OG tube Before breakfast    nitroGLYCERIN 2% TD oint  0.5 inch Topical (Top) Q6H     PRN Meds:dextrose 50%, dextrose 50%, sodium chloride 0.9%     Review of Systems  Objective:     Weight: 64.5 kg (142 lb 3.2 oz)  Body mass index is 23.66 kg/m².  Vital Signs (Most Recent):  Temp: (!) 92.4 °F (33.6 °C) (04/09/19 1505)  Pulse: 79 (04/09/19 1805)  Resp: 14 (04/09/19 1805)  BP: (!) 173/86 (04/09/19 1705)  SpO2: 100 % (04/09/19 1805) Vital Signs (24h Range):  Temp:  [92.4 °F (33.6 °C)-97.9 °F (36.6 °C)] 92.4 °F (33.6 °C)  Pulse:  [] 79  Resp:  [0-28] 14  SpO2:  [97 %-100 %] 100 %  BP: (122-186)/(64-93) 173/86  Arterial Line BP: ()/() 60/60     Date 04/09/19 0700 - 04/10/19 0659   Shift 3275-0240 8132-1026 0187-3281 24 Hour Total   INTAKE   I.V.(mL/kg) 417.5(6.5) 306.1(4.7)  723.6(11.2)   IV Piggyback 1000   1000   Shift Total(mL/kg) 1417.5(22) 306.1(4.7)  1723.6(26.7)   OUTPUT   Urine(mL/kg/hr) 1565(3) 250  1815   Shift Total(mL/kg) 1565(24.3) 250(3.9)  1815(28.1)   Weight (kg) 64.5 64.5 64.5 64.5              Vent Mode: A/C  Oxygen Concentration (%):  [40-41] 41  Resp Rate Total:  [14 br/min-25 br/min] 14 br/min  Vt Set:  [420 mL] 420 mL  PEEP/CPAP:  [5 cmH20] 5 cmH20  Mean Airway Pressure:  [8.1 cmH20-9.2 cmH20] 8.2 cmH20         NG/OG Tube " "04/07/19 1024 orogastric 18 Fr. Center mouth (Active)   Placement Check placement verified by x-ray 4/9/2019  3:05 PM   Tolerance no signs/symptoms of discomfort 4/9/2019  3:05 PM   Securement secured to commercial device 4/9/2019  3:05 PM   Clamp Status/Tolerance clamped 4/9/2019  3:05 PM   Suction Setting/Drainage Method dependent drainage 4/8/2019  3:05 AM   Insertion Site Appearance no redness, warmth, tenderness, skin breakdown, drainage 4/9/2019  3:05 PM   Drainage Brown 4/9/2019  3:05 PM   Flush/Irrigation flushed w/;water;no resistance met 4/9/2019  3:05 PM   Intake (mL) 20 mL 4/7/2019  7:05 PM   Residual Amount (ml) 10 ml 4/9/2019  7:05 AM            Urethral Catheter 04/07/19 2030 (Active)   Site Assessment Intact;Clean 4/9/2019  3:05 PM   Collection Container Urimeter 4/9/2019  3:05 PM   Securement Method secured to top of thigh w/ adhesive device 4/9/2019  3:05 PM   Catheter Care Performed yes 4/9/2019  3:05 PM   Reason for Continuing Urinary Catheterization Critically ill in ICU requiring intensive monitoring 4/9/2019  3:05 PM   CAUTI Prevention Bundle StatLock in place w 1" slack;Intact seal between catheter & drainage tubing;No dependent loops or kinks;Drainage bag not overfilled (<2/3 full);Drainage bag below bladder;Drainage bag off the floor;Green sheeting clip in use 4/9/2019  7:05 AM   Output (mL) 60 mL 4/9/2019  6:05 PM       Neurosurgery Physical Exam  E1VtM4; minimal w/d of RUE; tflexes lower ext b/l, no movement of LUE.   Cold R hand  Pupils fixed toda with R3mm, L5mm.     Significant Labs:  Recent Labs   Lab 04/08/19  0106  04/09/19  0036  04/09/19  0409 04/09/19  0614 04/09/19  1119   *   < > 536*  --   --  298* 140*   *   < > 156*  156*   < > 161* 161*  161* 160*   K 3.4*   < > 4.4  --   --  3.8 3.6   *   < > 123*  --   --  126* 128*   CO2 23   < > 22*  --   --  26 23   BUN 70*   < > 41*  --   --  37* 33*   CREATININE 2.1*   < > 1.6*  --   --  1.4 1.1   CALCIUM 9.7   < " > 8.9  --   --  9.5 9.0   MG 3.1*  --  2.2  --   --   --   --     < > = values in this interval not displayed.     Recent Labs   Lab 04/08/19  0106 04/09/19  0036   WBC 21.40* 20.39*   HGB 12.3 11.5*   HCT 40.5 38.7    154     No results for input(s): LABPT, INR, APTT in the last 48 hours.  Microbiology Results (last 7 days)     Procedure Component Value Units Date/Time    Urine culture [[id number]] Collected:  04/07/19 2303    Order Status:  Completed Specimen:  Urine Updated:  04/09/19 0756     Urine Culture, Routine No significant growth    Narrative:       Preferred Collection Type->Urine, Clean Catch  YELLOW AND GRAY TUBE        Recent Lab Results  (Last 25 results in the past 24 hours)      04/09/19  1832   04/09/19  1704   04/09/19  1624   04/09/19  1529   04/09/19  1411        Albumin               Alkaline Phosphatase               ALT               Anion Gap               AST               Baso #               Basophil%               Total Bilirubin               BUN, Bld               Calcium               Chloride               CO2               Creatinine               Differential Method               eGFR if                eGFR if non                Eos #               Eosinophil%               Glucose               Gran # (ANC)               Gran%               Hematocrit               Hemoglobin               Immature Grans (Abs)               Immature Granulocytes               Lymph #               Lymph%               Magnesium               MCH               MCHC               MCV               Mono #               Mono%               MPV               nRBC               Phosphorus               Platelets               POCT Glucose 234 173 221 194 153     Potassium               Total Protein               RBC               RDW               Sodium               WBC                                04/09/19  1311   04/09/19  1206   04/09/19  1129    04/09/19  1119   04/09/19  1008        Albumin       2.3       Alkaline Phosphatase       92       ALT       25       Anion Gap       9       AST       102       Baso #               Basophil%               Total Bilirubin       0.8  Comment:  For infants and newborns, interpretation of results should be based  on gestational age, weight and in agreement with clinical  observations.  Premature Infant recommended reference ranges:  Up to 24 hours.............<8.0 mg/dL  Up to 48 hours............<12.0 mg/dL  3-5 days..................<15.0 mg/dL  6-29 days.................<15.0 mg/dL         BUN, Bld       33       Calcium       9.0       Chloride       128  Comment:  *Critical value -   Results called to and read back by:CORIN PATTERSON,   JON         CO2       23       Creatinine       1.1       Differential Method               eGFR if        56.0       eGFR if non        48.5  Comment:  Calculation used to obtain the estimated glomerular filtration  rate (eGFR) is the CKD-EPI equation.          Eos #               Eosinophil%               Glucose       140       Gran # (ANC)               Gran%               Hematocrit               Hemoglobin               Immature Grans (Abs)               Immature Granulocytes               Lymph #               Lymph%               Magnesium               MCH               MCHC               MCV               Mono #               Mono%               MPV               nRBC               Phosphorus               Platelets               POCT Glucose 168 164 154   184     Potassium       3.6       Total Protein       6.2       RBC               RDW               Sodium       160       WBC                                04/09/19  0825   04/09/19  0715   04/09/19  0614   04/09/19  0610   04/09/19  0520        Albumin     2.5         Alkaline Phosphatase     95         ALT     23         Anion Gap     9         AST     89         Baso #                Basophil%               Total Bilirubin     0.8  Comment:  For infants and newborns, interpretation of results should be based  on gestational age, weight and in agreement with clinical  observations.  Premature Infant recommended reference ranges:  Up to 24 hours.............<8.0 mg/dL  Up to 48 hours............<12.0 mg/dL  3-5 days..................<15.0 mg/dL  6-29 days.................<15.0 mg/dL           BUN, Bld     37         Calcium     9.5         Chloride     126         CO2     26         Creatinine     1.4         Differential Method               eGFR if      41.8         eGFR if non      36.3  Comment:  Calculation used to obtain the estimated glomerular filtration  rate (eGFR) is the CKD-EPI equation.            Eos #               Eosinophil%               Glucose     298         Gran # (ANC)               Gran%               Hematocrit               Hemoglobin               Immature Grans (Abs)               Immature Granulocytes               Lymph #               Lymph%               Magnesium               MCH               MCHC               MCV               Mono #               Mono%               MPV               nRBC               Phosphorus               Platelets               POCT Glucose 245 286   276 367     Potassium     3.8         Total Protein     6.5         RBC               RDW               Sodium     161  Comment:  *Critical value -   Results called to and read back by:nayan rocha rn                161  Comment:  *Critical value -   Results called to and read back by:nayan rocha rn           WBC                                04/09/19  0409   04/09/19  0405   04/09/19  0308   04/09/19  0233   04/09/19  0203        Albumin               Alkaline Phosphatase               ALT               Anion Gap               AST               Baso #               Basophil%               Total Bilirubin               BUN, Bld               Calcium                Chloride               CO2               Creatinine               Differential Method               eGFR if                eGFR if non                Eos #               Eosinophil%               Glucose               Gran # (ANC)               Gran%               Hematocrit               Hemoglobin               Immature Grans (Abs)               Immature Granulocytes               Lymph #               Lymph%               Magnesium               MCH               MCHC               MCV               Mono #               Mono%               MPV               nRBC               Phosphorus               Platelets               POCT Glucose   353 364   472     Potassium               Total Protein               RBC               RDW               Sodium 161  Comment:  *Critical value -   Results called to and read back by:LYNDSEY GÓMEZ RN       147       WBC                                04/09/19  0101   04/09/19  0058   04/09/19  0036   04/09/19  0008   04/08/19  2259        Albumin     2.5         Alkaline Phosphatase     92         ALT     23         Anion Gap     11         AST     87         Baso #     0.02         Basophil%     0.1         Total Bilirubin     1.0  Comment:  For infants and newborns, interpretation of results should be based  on gestational age, weight and in agreement with clinical  observations.  Premature Infant recommended reference ranges:  Up to 24 hours.............<8.0 mg/dL  Up to 48 hours............<12.0 mg/dL  3-5 days..................<15.0 mg/dL  6-29 days.................<15.0 mg/dL           BUN, Bld     41         Calcium     8.9         Chloride     123         CO2     22         Creatinine     1.6         Differential Method     Automated         eGFR if      35.6         eGFR if non      30.9  Comment:  Calculation used to obtain the estimated glomerular filtration  rate (eGFR) is the CKD-EPI  equation.            Eos #     0.0         Eosinophil%     0.0         Glucose     536  Comment:  *Critical value -   Results called to and read back by:WENDY LOPEZ   RN           Gran # (ANC)     18.0         Gran%     88.2         Hematocrit     38.7         Hemoglobin     11.5         Immature Grans (Abs)     0.17  Comment:  Mild elevation in immature granulocytes is non specific and   can be seen in a variety of conditions including stress response,   acute inflammation, trauma and pregnancy. Correlation with other   laboratory and clinical findings is essential.           Immature Granulocytes     0.8         Lymph #     1.2         Lymph%     5.8         Magnesium     2.2         MCH     22.7         MCHC     29.7         MCV     77         Mono #     1.1         Mono%     5.1         MPV     12.1         nRBC     0         Phosphorus     3.3         Platelets     154         POCT Glucose 478 >500   347 348     Potassium     4.4         Total Protein     6.2         RBC     5.06         RDW     13.4         Sodium     156              156         WBC     20.39                              Significant Diagnostics:  CT: Ct Head Without Contrast    Result Date: 4/9/2019  Evolving recent large right MCA and PCA distribution infarct with superimposed petechial type hemorrhage.  Significant interval increase in the extent of intracranial mass effect as above, noting approximately 1.4 cm of leftward midline shift, developing unilateral downward transtentorial herniation, and left lateral ventricular entrapment. Punctate areas of cortical infarction the left cerebral hemisphere are better delineated on prior MRI. Chronic microvascular ischemic change in supratentorial white matter. No new territorial infarct identified. This report was flagged in Epic as abnormal. Electronically signed by: Arthur Guerra MD Date:    04/09/2019 Time:    08:15  Echoencephalography: No results found in the last 24 hours.  MRI: No  results found in the last 24 hours.

## 2019-04-10 NOTE — CONSULTS
[facility]  Palliative Medicine  Consult Note    Patient Name: Sherita Mahmood  MRN: [id number]  Admission Date: 4/7/2019  Hospital Length of Stay: 3 days  Code Status: Full Code   Attending Provider: Rob Oconnell MD  Consulting Provider: VINEET Mckeon  Primary Care Physician: Primary Doctor No  Principal Problem:Embolic stroke involving right middle cerebral artery          Inpatient consult to Palliative Care  Consult performed by: VINEET Kirby  Consult ordered by: Jerry Harper MD  Reason for consult: emotional support   Assessment/Recommendations: Palliative medicine consult received. Chart reviewed and patient discussed with Dr. Harper.  Order clarified at this time to include goals of care.     Advanced Care Planning:   No advanced directives received.   Next of kin for medical decision making:  Kurt Silverman [phone]   Full code per primary team     Goals of Care   - Palliative Medicine APRN met with  at bedside.  Lengthy discussion about goals of care  -   - Mr. Silverman is beginning to understand  More about Miss Magallon   current clinical condition.    - Patient's , Kurt Silverman, in agreement to continue current plan of care. Patient is currently intubated.   has made decision of no  CPR - chest, compression, shocks  in the event her heart stops.    - 's Layne and Chapo have confirmed with family.  DNR order to be written per primary team.  - As per , the patient's sister will be arriving on Friday, April 12  In the morning to have further discussion about goals of care.  Exact time not known.  Palliative Med. Contact information provided and Mr. Silverman will notify palliative medicine when the family arrives.  Primary team is aware.      Full consult to follow.   Thank you for consult and opportunity to participate in Ms. Mahmood's care.   HU Mccallum, ACNS-BC  Palliative Medicine   Ext  [phone]

## 2019-04-10 NOTE — PLAN OF CARE
Problem: Adult Inpatient Plan of Care  Goal: Plan of Care Review  Outcome: Revised  POC reviewed with pt at 0500. Pt cannot verbalized understanding. No family at bedside. Pt neurologically unchanged. HR 60's-90's. Vent well tolerated. UO >60, no BM. Bear hugger on pt. Will continue to monitor. See flowsheets for full assessment and VS info

## 2019-04-10 NOTE — PROGRESS NOTES
Unable to retrieve oral or axillary temp on pt. Reported by day shift nurses that pt's temp has been 92 degrees and per Dr. Malone please do not place pt on bear hugger for neuro protection. Rectal probe place with continuous temp monitoring. Current temp 93.4.

## 2019-04-10 NOTE — ASSESSMENT & PLAN NOTE
No vent weaning for now  Daily abg  Daily cxr  Vent Mode: A/C  Oxygen Concentration (%):  [40-41] 41  Resp Rate Total:  [14 br/min-16 br/min] 14 br/min  Vt Set:  [420 mL] 420 mL  PEEP/CPAP:  [5 cmH20] 5 cmH20  Mean Airway Pressure:  [8.1 cmH20-8.6 cmH20] 8.2 cmH20

## 2019-04-10 NOTE — PROGRESS NOTES
"[facility]  Neurosurgery  Progress Note    Subjective:     History of Present Illness: Patient is a 79 year old female with a history of DM2, HTN, HLD, dementia who presented 4/7 with R MCA and PCA stroke after being found unresponsive at home. No tPA or thrombectomy. Today on MRI discovered to have hemorrhagic conversion. No neurostatus changes. Also with ischemic R hand, vascular is consulted.     Post-Op Info:  * No surgery found *         Interval History: 4/9: exam remains very poor; known cold right upper extremity due to vascular malformation. Family knowledgable of worsening prognosis, is not interested in surgery at this point and would like to continue medical management.  feels they should "let nature take it's course".    Medications:  Continuous Infusions:   sodium chloride 0.9% 75 mL/hr at 04/09/19 1805    insulin (HUMAN R) infusion (adults) 1.48 Units/hr (04/09/19 1805)    nicardipine Stopped (04/09/19 1000)     Scheduled Meds:   atorvastatin  40 mg Per OG tube Daily    chlorhexidine  15 mL Mouth/Throat BID    famotidine  20 mg Per OG tube Daily    levothyroxine  25 mcg Per OG tube Before breakfast    nitroGLYCERIN 2% TD oint  0.5 inch Topical (Top) Q6H     PRN Meds:dextrose 50%, dextrose 50%, sodium chloride 0.9%     Review of Systems  Objective:     Weight: 64.5 kg (142 lb 3.2 oz)  Body mass index is 23.66 kg/m².  Vital Signs (Most Recent):  Temp: (!) 92.4 °F (33.6 °C) (04/09/19 1505)  Pulse: 79 (04/09/19 1805)  Resp: 14 (04/09/19 1805)  BP: (!) 173/86 (04/09/19 1705)  SpO2: 100 % (04/09/19 1805) Vital Signs (24h Range):  Temp:  [92.4 °F (33.6 °C)-97.9 °F (36.6 °C)] 92.4 °F (33.6 °C)  Pulse:  [] 79  Resp:  [0-28] 14  SpO2:  [97 %-100 %] 100 %  BP: (122-186)/(64-93) 173/86  Arterial Line BP: ()/() 60/60     Date 04/09/19 0700 - 04/10/19 0659   Shift 1193-5103 3629-4975 9308-9789 24 Hour Total   INTAKE   I.V.(mL/kg) 417.5(6.5) 306.1(4.7)  " "723.6(11.2)   IV Piggyback 1000   1000   Shift Total(mL/kg) 1417.5(22) 306.1(4.7)  1723.6(26.7)   OUTPUT   Urine(mL/kg/hr) 1565(3) 250  1815   Shift Total(mL/kg) 1565(24.3) 250(3.9)  1815(28.1)   Weight (kg) 64.5 64.5 64.5 64.5              Vent Mode: A/C  Oxygen Concentration (%):  [40-41] 41  Resp Rate Total:  [14 br/min-25 br/min] 14 br/min  Vt Set:  [420 mL] 420 mL  PEEP/CPAP:  [5 cmH20] 5 cmH20  Mean Airway Pressure:  [8.1 cmH20-9.2 cmH20] 8.2 cmH20         NG/OG Tube 04/07/19 1024 orogastric 18 Fr. Center mouth (Active)   Placement Check placement verified by x-ray 4/9/2019  3:05 PM   Tolerance no signs/symptoms of discomfort 4/9/2019  3:05 PM   Securement secured to commercial device 4/9/2019  3:05 PM   Clamp Status/Tolerance clamped 4/9/2019  3:05 PM   Suction Setting/Drainage Method dependent drainage 4/8/2019  3:05 AM   Insertion Site Appearance no redness, warmth, tenderness, skin breakdown, drainage 4/9/2019  3:05 PM   Drainage Brown 4/9/2019  3:05 PM   Flush/Irrigation flushed w/;water;no resistance met 4/9/2019  3:05 PM   Intake (mL) 20 mL 4/7/2019  7:05 PM   Residual Amount (ml) 10 ml 4/9/2019  7:05 AM            Urethral Catheter 04/07/19 2030 (Active)   Site Assessment Intact;Clean 4/9/2019  3:05 PM   Collection Container Urimeter 4/9/2019  3:05 PM   Securement Method secured to top of thigh w/ adhesive device 4/9/2019  3:05 PM   Catheter Care Performed yes 4/9/2019  3:05 PM   Reason for Continuing Urinary Catheterization Critically ill in ICU requiring intensive monitoring 4/9/2019  3:05 PM   CAUTI Prevention Bundle StatLock in place w 1" slack;Intact seal between catheter & drainage tubing;No dependent loops or kinks;Drainage bag not overfilled (<2/3 full);Drainage bag below bladder;Drainage bag off the floor;Green sheeting clip in use 4/9/2019  7:05 AM   Output (mL) 60 mL 4/9/2019  6:05 PM       Neurosurgery Physical Exam  [id number]; minimal w/d of RUE; tflexes lower ext b/l, no movement of LUE. "   Cold R hand  Pupils fixed toda with R3mm, L5mm.     Significant Labs:  Recent Labs   Lab 04/08/19  0106  04/09/19  0036  04/09/19  0409 04/09/19  0614 04/09/19  1119   *   < > 536*  --   --  298* 140*   *   < > 156*  156*   < > 161* 161*  161* 160*   K 3.4*   < > 4.4  --   --  3.8 3.6   *   < > 123*  --   --  126* 128*   CO2 23   < > 22*  --   --  26 23   BUN 70*   < > 41*  --   --  37* 33*   CREATININE 2.1*   < > 1.6*  --   --  1.4 1.1   CALCIUM 9.7   < > 8.9  --   --  9.5 9.0   MG 3.1*  --  2.2  --   --   --   --     < > = values in this interval not displayed.     Recent Labs   Lab 04/08/19 0106 04/09/19  0036   WBC 21.40* 20.39*   HGB 12.3 11.5*   HCT 40.5 38.7    154     No results for input(s): LABPT, INR, APTT in the last 48 hours.  Microbiology Results (last 7 days)     Procedure Component Value Units Date/Time    Urine culture [[id number]] Collected:  04/07/19 2303    Order Status:  Completed Specimen:  Urine Updated:  04/09/19 0756     Urine Culture, Routine No significant growth    Narrative:       Preferred Collection Type->Urine, Clean Catch  YELLOW AND GRAY TUBE        Recent Lab Results  (Last 25 results in the past 24 hours)      04/09/19  1832   04/09/19  1704   04/09/19  1624   04/09/19  1529   04/09/19  1411        Albumin               Alkaline Phosphatase               ALT               Anion Gap               AST               Baso #               Basophil%               Total Bilirubin               BUN, Bld               Calcium               Chloride               CO2               Creatinine               Differential Method               eGFR if                eGFR if non                Eos #               Eosinophil%               Glucose               Gran # (ANC)               Gran%               Hematocrit               Hemoglobin               Immature Grans (Abs)               Immature Granulocytes               Lymph #                Lymph%               Magnesium               MCH               MCHC               MCV               Mono #               Mono%               MPV               nRBC               Phosphorus               Platelets               POCT Glucose 234 173 221 194 153     Potassium               Total Protein               RBC               RDW               Sodium               WBC                                04/09/19  1311   04/09/19  1206   04/09/19  1129   04/09/19  1119   04/09/19  1008        Albumin       2.3       Alkaline Phosphatase       92       ALT       25       Anion Gap       9       AST       102       Baso #               Basophil%               Total Bilirubin       0.8  Comment:  For infants and newborns, interpretation of results should be based  on gestational age, weight and in agreement with clinical  observations.  Premature Infant recommended reference ranges:  Up to 24 hours.............<8.0 mg/dL  Up to 48 hours............<12.0 mg/dL  3-5 days..................<15.0 mg/dL  6-29 days.................<15.0 mg/dL         BUN, Bld       33       Calcium       9.0       Chloride       128  Comment:  *Critical value -   Results called to and read back by:CORIN PATTERSON,   RN         CO2       23       Creatinine       1.1       Differential Method               eGFR if        56.0       eGFR if non        48.5  Comment:  Calculation used to obtain the estimated glomerular filtration  rate (eGFR) is the CKD-EPI equation.          Eos #               Eosinophil%               Glucose       140       Gran # (ANC)               Gran%               Hematocrit               Hemoglobin               Immature Grans (Abs)               Immature Granulocytes               Lymph #               Lymph%               Magnesium               MCH               MCHC               MCV               Mono #               Mono%               MPV               nRBC                Phosphorus               Platelets               POCT Glucose 168 164 154   184     Potassium       3.6       Total Protein       6.2       RBC               RDW               Sodium       160       WBC                                04/09/19  0825   04/09/19  0715   04/09/19  0614   04/09/19  0610   04/09/19  0520        Albumin     2.5         Alkaline Phosphatase     95         ALT     23         Anion Gap     9         AST     89         Baso #               Basophil%               Total Bilirubin     0.8  Comment:  For infants and newborns, interpretation of results should be based  on gestational age, weight and in agreement with clinical  observations.  Premature Infant recommended reference ranges:  Up to 24 hours.............<8.0 mg/dL  Up to 48 hours............<12.0 mg/dL  3-5 days..................<15.0 mg/dL  6-29 days.................<15.0 mg/dL           BUN, Bld     37         Calcium     9.5         Chloride     126         CO2     26         Creatinine     1.4         Differential Method               eGFR if      41.8         eGFR if non      36.3  Comment:  Calculation used to obtain the estimated glomerular filtration  rate (eGFR) is the CKD-EPI equation.            Eos #               Eosinophil%               Glucose     298         Gran # (ANC)               Gran%               Hematocrit               Hemoglobin               Immature Grans (Abs)               Immature Granulocytes               Lymph #               Lymph%               Magnesium               MCH               MCHC               MCV               Mono #               Mono%               MPV               nRBC               Phosphorus               Platelets               POCT Glucose 245 286   276 367     Potassium     3.8         Total Protein     6.5         RBC               RDW               Sodium     161  Comment:  *Critical value -   Results called to and read back by:nayan rocha    rn                161  Comment:  *Critical value -   Results called to and read back by:nayan rocha   rn           WBC                                04/09/19  0409   04/09/19  0405   04/09/19  0308   04/09/19  0233   04/09/19  0203        Albumin               Alkaline Phosphatase               ALT               Anion Gap               AST               Baso #               Basophil%               Total Bilirubin               BUN, Bld               Calcium               Chloride               CO2               Creatinine               Differential Method               eGFR if                eGFR if non                Eos #               Eosinophil%               Glucose               Gran # (ANC)               Gran%               Hematocrit               Hemoglobin               Immature Grans (Abs)               Immature Granulocytes               Lymph #               Lymph%               Magnesium               MCH               MCHC               MCV               Mono #               Mono%               MPV               nRBC               Phosphorus               Platelets               POCT Glucose   353 364   472     Potassium               Total Protein               RBC               RDW               Sodium 161  Comment:  *Critical value -   Results called to and read back by:LYNDSEY GÓMEZ RN       147       WBC                                04/09/19  0101   04/09/19  0058   04/09/19  0036   04/09/19  0008   04/08/19  2259        Albumin     2.5         Alkaline Phosphatase     92         ALT     23         Anion Gap     11         AST     87         Baso #     0.02         Basophil%     0.1         Total Bilirubin     1.0  Comment:  For infants and newborns, interpretation of results should be based  on gestational age, weight and in agreement with clinical  observations.  Premature Infant recommended reference ranges:  Up to 24 hours.............<8.0  mg/dL  Up to 48 hours............<12.0 mg/dL  3-5 days..................<15.0 mg/dL  6-29 days.................<15.0 mg/dL           BUN, Bld     41         Calcium     8.9         Chloride     123         CO2     22         Creatinine     1.6         Differential Method     Automated         eGFR if      35.6         eGFR if non      30.9  Comment:  Calculation used to obtain the estimated glomerular filtration  rate (eGFR) is the CKD-EPI equation.            Eos #     0.0         Eosinophil%     0.0         Glucose     536  Comment:  *Critical value -   Results called to and read back by:WENDY LOPEZ RN           Gran # (ANC)     18.0         Gran%     88.2         Hematocrit     38.7         Hemoglobin     11.5         Immature Grans (Abs)     0.17  Comment:  Mild elevation in immature granulocytes is non specific and   can be seen in a variety of conditions including stress response,   acute inflammation, trauma and pregnancy. Correlation with other   laboratory and clinical findings is essential.           Immature Granulocytes     0.8         Lymph #     1.2         Lymph%     5.8         Magnesium     2.2         MCH     22.7         MCHC     29.7         MCV     77         Mono #     1.1         Mono%     5.1         MPV     12.1         nRBC     0         Phosphorus     3.3         Platelets     154         POCT Glucose 478 >500   347 348     Potassium     4.4         Total Protein     6.2         RBC     5.06         RDW     13.4         Sodium     156              156         WBC     20.39                              Significant Diagnostics:  CT: Ct Head Without Contrast    Result Date: 4/9/2019  Evolving recent large right MCA and PCA distribution infarct with superimposed petechial type hemorrhage.  Significant interval increase in the extent of intracranial mass effect as above, noting approximately 1.4 cm of leftward midline shift, developing unilateral downward  transtentorial herniation, and left lateral ventricular entrapment. Punctate areas of cortical infarction the left cerebral hemisphere are better delineated on prior MRI. Chronic microvascular ischemic change in supratentorial white matter. No new territorial infarct identified. This report was flagged in Epic as abnormal. Electronically signed by: Arthur Guerra MD Date:    04/09/2019 Time:    08:15  Echoencephalography: No results found in the last 24 hours.  MRI: No results found in the last 24 hours.    Assessment/Plan:     Nontraumatic cortical hemorrhage of right cerebral hemisphere  79F PMH HTN, HLD, dementia, DM presented with R MCA and PCA infarct now with hemorrhagic conversion.     -- MRI with worsening edema  -- No acute neurosurgical intervention at this point   -- Maximum medical therapy per ICU; exam worsening  -- Family expressed that they have no desire for surgery today (from the ). If medical therapy alone does not improve symptoms  -- Will sign off a this time, please call with questions.         Gregorio Luis MD  Neurosurgery  [facility]

## 2019-04-10 NOTE — SUBJECTIVE & OBJECTIVE
Interval History:     Past Medical History:   Diagnosis Date    Breast cancer     Diabetes mellitus     Hyperlipidemia     Hypertension     Mild non proliferative diabetic retinopathy 10/13/2014    Nuclear sclerosis 10/13/2014    Open angle with borderline findings and low glaucoma risk in both eyes 10/13/2014    Osteoarthritis of right hip 2015       Past Surgical History:   Procedure Laterality Date    BREAST LUMPECTOMY         Review of patient's allergies indicates:  No Known Allergies    Medications:  Continuous Infusions:   insulin (HUMAN R) infusion (adults) 0.97 Units/hr (04/10/19 1505)    lactated ringers 50 mL/hr at 04/10/19 1505    nicardipine Stopped (19 1000)     Scheduled Meds:   atorvastatin  40 mg Per OG tube Daily    chlorhexidine  15 mL Mouth/Throat BID    famotidine  20 mg Per OG tube Daily    levothyroxine  25 mcg Per OG tube Before breakfast    nitroGLYCERIN 2% TD oint  0.5 inch Topical (Top) Q6H     PRN Meds:dextrose 50%, dextrose 50%, sodium chloride 0.9%    Family History     Problem Relation (Age of Onset)    Cancer Maternal Aunt    No Known Problems Mother, Father, Sister, Brother, Maternal Uncle, Paternal Aunt, Paternal Uncle, Maternal Grandmother, Maternal Grandfather, Paternal Grandmother, Paternal Grandfather        Tobacco Use    Smoking status: Former Smoker     Types: Cigarettes     Last attempt to quit: 1962     Years since quittin.7    Smokeless tobacco: Never Used   Substance and Sexual Activity    Alcohol use: Yes     Alcohol/week: 0.0 oz     Comment: former heavy use of wine? difficult to assess    Drug use: No    Sexual activity: Yes     Partners: Male     Birth control/protection: Post-menopausal       Review of Systems   Unable to perform ROS: Intubated     Objective:     Vital Signs (Most Recent):  Temp: (!) 93.6 °F (34.2 °C) (04/10/19 1305)  Pulse: 80 (04/10/19 1305)  Resp: 14 (04/10/19 1305)  BP: (!) 169/74 (04/10/19  1305)  SpO2: 100 % (04/10/19 1305) Vital Signs (24h Range):  Temp:  [91.8 °F (33.2 °C)-93.6 °F (34.2 °C)] 93.6 °F (34.2 °C)  Pulse:  [66-81] 80  Resp:  [14-15] 14  SpO2:  [99 %-100 %] 100 %  BP: (147-192)/(71-92) 169/74     Weight: 64.5 kg (142 lb 3.2 oz)  Body mass index is 23.66 kg/m².    Review of Symptoms  Symptom Assessment (ESAS 0-10 scale)   ESAS 0 1 2 3 4 5 6 7 8 9 10   Pain              Dyspnea              Anxiety              Nausea              Depression               Anorexia              Fatigue              Insomnia              Restlessness               Agitation              CAM / Delirium __ --  ___+   Constipation     __ --  ___+   Diarrhea           __ --  ___+  Bowel Management Plan (BMP): No    Comments: intubated, unable to complete ESAS    Pain Assessment:     OME in 24 hours: 0    Performance Status: 10    ECOG Performance Status Grade: 4 - Completely disabled    Physical Exam   Constitutional: No distress.   Cardiovascular: Normal rate, regular rhythm and normal heart sounds.   Pulmonary/Chest:   Intubated    Abdominal: Soft. Bowel sounds are normal.   Musculoskeletal: She exhibits edema.   Neurological:   Unresponsive    Skin:   Right arm cool to touch,  Ischemic    Nursing note and vitals reviewed.      Significant Labs: All pertinent labs within the past 24 hours have been reviewed.  CBC:   Recent Labs   Lab 04/10/19  0002   WBC 19.87*   HGB 10.5*   HCT 35.9*   MCV 78*   PLT SEE COMMENT     BMP:  Recent Labs   Lab 04/10/19  0002  04/10/19  1231   GLU  --    < > 159*   NA  --    < > 161*  161*   K  --    < > 3.3*   CL  --    < > >130*   CO2  --    < > 22*   BUN  --    < > 30*   CREATININE  --    < > 0.9   CALCIUM  --    < > 8.2*   MG 2.2  --   --     < > = values in this interval not displayed.     LFT:  Lab Results   Component Value Date     (H) 04/10/2019    ALKPHOS 81 04/10/2019    BILITOT 0.8 04/10/2019     Albumin:   Albumin   Date Value Ref Range Status   04/10/2019 1.7  (L) 3.5 - 5.2 g/dL Final     Protein:   Total Protein   Date Value Ref Range Status   04/10/2019 4.8 (L) 6.0 - 8.4 g/dL Final     Lactic acid:   Lab Results   Component Value Date    LACTATE 2.7 (H) 2019    LACTATE 3.7 (HH) 2019       Significant Imaging: I have reviewed all pertinent imaging results/findings within the past 24 hours.    Advance Care Planning   Advanced Directives::  Living Will: No  LaPOST: No  Do Not Resuscitate Status: No  Medical Power of : No    Decision-Making Capacity: Family answered questions       Living Arrangements: Lives with spouse    Psychosocial/Cultural:  to Kurt Silverman 14 yrs,  Second marriage.  First  ,  No children.  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     Spiritual:     F- Martha and Belief:  Yazidi     I - Importance:  Went to [employer] every week until she was dx with dementia 1.5 yrs ago  .  C - Community:     A - Address in Care: amenable to  services

## 2019-04-10 NOTE — ASSESSMENT & PLAN NOTE
Consult performed by: Charlene Santos, CNS  Consult ordered by: Jerry Harper MD  Reason for consult: emotional support   Assessment/Recommendations: Palliative medicine consult received. Chart reviewed and patient discussed with Dr. Harper.  Order clarified at this time to include goals of care.     Impression:  Ms. Olivier is a 80 yo lady admitted to neuro critical care  4/7 with R MCA and PCA stroke after being found unresponsive at home. No tPA or thrombectomy. Found to have hemorrhagic conversion. She is intubated, unresponsive. Right arm cool to touch and ischemic.  Appears to be comfortable, no facial grimacing or moaning.  No acute distress.       Advanced Care Planning:   - No advanced directives received.   - Next of kin for medical decision making:  Kurt Silverman [phone]   -  Kurt Silverman appears to have poor health literacy and insight regarding current clinical condition   - Full code per primary team      Goals of Care   - Palliative Medicine APRN met with  at bedside.  Lengthy discussion about goals of care  -   - Mr. Silverman is beginning to understand  More about Miss Magallon   current clinical condition.    - Patient's , Kurt Silverman, in agreement to continue current plan of care. Patient is currently intubated.   has made decision of no  CPR - chest, compression, shocks  in the event her heart stops.    - Dr.'s Tillman and Chapo have confirmed with family.  DNR order to be written per primary team.  - As per , the patient's sister will be arriving on Friday, April 12  In the morning to have further discussion about goals of care.  Exact time not known.  Palliative Med. Contact information provided and Mr. Silverman will notify palliative medicine when the family arrives.  Primary team is aware.       Plan/Recommendations   -  states being amenable to DNR order   - Palliative care building rapport with family and will continue to  assist family in making realistic decisions  - Additional family is expected to arrive on Friday, April 12 - palliative care will continue goals of care conversation.   - intense emotional support. - will contact  for additional family support     Primary team aware of the above goals of care conversation.

## 2019-04-10 NOTE — ASSESSMENT & PLAN NOTE
Right MCA CVA   updated and MRI reviewed with him at bedside  hemicrani consult - not a surgical candidate as per NSGY  PT  OT  VN following  Palliative care consulted for goals of care discussion

## 2019-04-10 NOTE — PROGRESS NOTES
Notified Dr. Malone and team regarding pt's temp of 92.4 F. I recommended a warming blanket. Not ordered at this time S/T neuro protection. Applied two thick blankets. Will continue to monitor.

## 2019-04-10 NOTE — PROGRESS NOTES
Notified Denita Dave NP regarding pt's new pupillary change of the R pupil being fixed. No new orders at this time. Will continue to monitor.

## 2019-04-10 NOTE — NURSING
Mariusz Garcia NP notified of pt's Phos of 2.3 and and K of 3.1. Reported K+ being replaced now and will recheck at 0500. No new orders at this time.

## 2019-04-10 NOTE — PLAN OF CARE
Problem: Adult Inpatient Plan of Care  Goal: Plan of Care Review  Outcome: Ongoing (interventions implemented as appropriate)  POC reviewed with pt's  at 1600. Pt's  verbalized understanding, but needs reinforcement. Questions and concerns addressed. Pt began having pupillary changes at 0715, Mannitol was pushed, and pt taken to STAT CT. See notes for details. Dr. Malone spoke to pt's  regarding prognosis. Pt cont on insulin drip. Will continue to monitor. See flowsheets for full assessment and VS info

## 2019-04-10 NOTE — CONSULTS
[facility]  Palliative Medicine  Consult Note    Patient Name: Sherita Mamhood  MRN: [id number]  Admission Date: 4/7/2019  Hospital Length of Stay: 3 days  Code Status: Full Code   Attending Provider: Rob Oconnell MD  Consulting Provider: VINEET Mckeon  Primary Care Physician: Primary Doctor No  Principal Problem:Embolic stroke involving right middle cerebral artery    Patient information was obtained from relative(s), past medical records and ER records.      Consults  Assessment/Plan:     Palliative care encounter  Consult performed by: VINEET Kirby  Consult ordered by: Jerry Harper MD  Reason for consult: emotional support   Assessment/Recommendations: Palliative medicine consult received. Chart reviewed and patient discussed with Dr. Harper.  Order clarified at this time to include goals of care.     Impression:  Ms. Olivier is a 78 yo lady admitted to neuro critical care  4/7 with R MCA and PCA stroke after being found unresponsive at home. No tPA or thrombectomy. Found to have hemorrhagic conversion. She is intubated, unresponsive. Right arm cool to touch and ischemic.  Appears to be comfortable, no facial grimacing or moaning.  No acute distress.       Advanced Care Planning:   - No advanced directives received.   - Next of kin for medical decision making:  Kurt Silverman [phone]   -  Kurt Silverman appears to have poor health literacy and insight regarding current clinical condition   - Full code per primary team      Goals of Care   - Palliative Medicine APRN met with  at bedside.  Lengthy discussion about goals of care  -   - Mr. Silverman is beginning to understand  More about Miss Magallon   current clinical condition.    - Patient's , Kurt Silverman, in agreement to continue current plan of care. Patient is currently intubated.   has made decision of no  CPR - chest, compression, shocks  in the event her heart stops.     - Dr.'s Tillman and Chapo have confirmed with family.  DNR order to be written per primary team.  - As per , the patient's sister will be arriving on Friday, April 12  In the morning to have further discussion about goals of care.  Exact time not known.  Palliative Med. Contact information provided and Mr. Silverman will notify palliative medicine when the family arrives.  Primary team is aware.       Plan/Recommendations   -  states being amenable to DNR order   - Palliative care building rapport with family and will continue to assist family in making realistic decisions  - Additional family is expected to arrive on Friday, April 12 - palliative care will continue goals of care conversation.   - intense emotional support. - will contact  for additional family support     Primary team aware of the above goals of care conversation.             Thank you for your consult. I will follow-up with patient. Please contact us if you have any additional questions.    Subjective:     HPI:   Ms. Mahmood is 78 y.o lady with PMHx of HTN (ran out medications), DMTII (on metformin 500 mg BID), HLD (on Atorvastatin 20 mg), Hypothyroidisms(on Levothyroxine 25 mg daily) and Dementia (started 1.5  years ago). Presented [facility] via EMS with c/o  L side weakness and Gaze deviation to R.     As reported by  was last known in normal state on 4/6/19 around 10 pm.   states  he found her unresponsive around 7:30 AM this morning (although he is not exactly sure that he correctly remembers the time). She had urinated on herself during the night. EMS arrived shortly after 9 AM. Per  also, patient has been very tired and she rather wants to be at her bed for the last 2 weeks.      denies fevers, chills, cough, chest pain, sob, rhinorrhea, headache, abdominal pain or changes in BM/urinary habits, other than constipation. Patient ran out of BP medications for unknown period of time. She  was only taking Atorvastatin, Metformin and Levothyroxine at home. Patient does not have history of smoker or stroke in the past. She was diagnosed with dementia 1.5 years ago, but she was oriented to herself, walks and talks at baseline and able to bathe, dress and feed herself.      Upon arrival to ED patient was found with R gaze deviation, LUE/LLE flaccid paralysis, and RUE/RLE able to slowly do some spontaneous movements. Patient was not verbal and no able to follow commands. Patient was stroke activated and CTH reported R MCA stroke and occlusion. Patient was out of tPA window and non-candidate for thrombectomy.           Hospital Course:  No notes on file    Interval History:     Past Medical History:   Diagnosis Date    Breast cancer 2003/1995    Diabetes mellitus     Hyperlipidemia     Hypertension     Mild non proliferative diabetic retinopathy 10/13/2014    Nuclear sclerosis 10/13/2014    Open angle with borderline findings and low glaucoma risk in both eyes 10/13/2014    Osteoarthritis of right hip 1/7/2015       Past Surgical History:   Procedure Laterality Date    BREAST LUMPECTOMY  1995       Review of patient's allergies indicates:  No Known Allergies    Medications:  Continuous Infusions:   insulin (HUMAN R) infusion (adults) 0.97 Units/hr (04/10/19 1505)    lactated ringers 50 mL/hr at 04/10/19 1505    nicardipine Stopped (04/09/19 1000)     Scheduled Meds:   atorvastatin  40 mg Per OG tube Daily    chlorhexidine  15 mL Mouth/Throat BID    famotidine  20 mg Per OG tube Daily    levothyroxine  25 mcg Per OG tube Before breakfast    nitroGLYCERIN 2% TD oint  0.5 inch Topical (Top) Q6H     PRN Meds:dextrose 50%, dextrose 50%, sodium chloride 0.9%    Family History     Problem Relation (Age of Onset)    Cancer Maternal Aunt    No Known Problems Mother, Father, Sister, Brother, Maternal Uncle, Paternal Aunt, Paternal Uncle, Maternal Grandmother, Maternal Grandfather, Paternal  Grandmother, Paternal Grandfather        Tobacco Use    Smoking status: Former Smoker     Types: Cigarettes     Last attempt to quit: 1962     Years since quittin.7    Smokeless tobacco: Never Used   Substance and Sexual Activity    Alcohol use: Yes     Alcohol/week: 0.0 oz     Comment: former heavy use of wine? difficult to assess    Drug use: No    Sexual activity: Yes     Partners: Male     Birth control/protection: Post-menopausal       Review of Systems   Unable to perform ROS: Intubated     Objective:     Vital Signs (Most Recent):  Temp: (!) 93.6 °F (34.2 °C) (04/10/19 1305)  Pulse: 80 (04/10/19 1305)  Resp: 14 (04/10/19 1305)  BP: (!) 169/74 (04/10/19 1305)  SpO2: 100 % (04/10/19 1305) Vital Signs (24h Range):  Temp:  [91.8 °F (33.2 °C)-93.6 °F (34.2 °C)] 93.6 °F (34.2 °C)  Pulse:  [66-81] 80  Resp:  [14-15] 14  SpO2:  [99 %-100 %] 100 %  BP: (147-192)/(71-92) 169/74     Weight: 64.5 kg (142 lb 3.2 oz)  Body mass index is 23.66 kg/m².    Review of Symptoms  Symptom Assessment (ESAS 0-10 scale)   ESAS 0 1 2 3 4 5 6 7 8 9 10   Pain              Dyspnea              Anxiety              Nausea              Depression               Anorexia              Fatigue              Insomnia              Restlessness               Agitation              CAM / Delirium __ --  ___+   Constipation     __ --  ___+   Diarrhea           __ --  ___+  Bowel Management Plan (BMP): No    Comments: intubated, unable to complete ESAS    Pain Assessment:     OME in 24 hours: 0    Performance Status: 10    ECOG Performance Status Grade: 4 - Completely disabled    Physical Exam   Constitutional: No distress.   Cardiovascular: Normal rate, regular rhythm and normal heart sounds.   Pulmonary/Chest:   Intubated    Abdominal: Soft. Bowel sounds are normal.   Musculoskeletal: She exhibits edema.   Neurological:   Unresponsive    Skin:   Right arm cool to touch,  Ischemic    Nursing note and vitals reviewed.      Significant  Labs: All pertinent labs within the past 24 hours have been reviewed.  CBC:   Recent Labs   Lab 04/10/19  0002   WBC 19.87*   HGB 10.5*   HCT 35.9*   MCV 78*   PLT SEE COMMENT     BMP:  Recent Labs   Lab 04/10/19  0002  04/10/19  1231   GLU  --    < > 159*   NA  --    < > 161*  161*   K  --    < > 3.3*   CL  --    < > >130*   CO2  --    < > 22*   BUN  --    < > 30*   CREATININE  --    < > 0.9   CALCIUM  --    < > 8.2*   MG 2.2  --   --     < > = values in this interval not displayed.     LFT:  Lab Results   Component Value Date     (H) 04/10/2019    ALKPHOS 81 04/10/2019    BILITOT 0.8 04/10/2019     Albumin:   Albumin   Date Value Ref Range Status   04/10/2019 1.7 (L) 3.5 - 5.2 g/dL Final     Protein:   Total Protein   Date Value Ref Range Status   04/10/2019 4.8 (L) 6.0 - 8.4 g/dL Final     Lactic acid:   Lab Results   Component Value Date    LACTATE 2.7 (H) 2019    LACTATE 3.7 (HH) 2019       Significant Imaging: I have reviewed all pertinent imaging results/findings within the past 24 hours.    Advance Care Planning   Advanced Directives::  Living Will: No  LaPOST: No  Do Not Resuscitate Status: No  Medical Power of : No    Decision-Making Capacity: Family answered questions       Living Arrangements: Lives with spouse    Psychosocial/Cultural:  to Kurt Silverman 14 yrs,  Second marriage.  First  ,  No children.  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     Spiritual:     F- Martha and Belief:  Uatsdin     I - Importance:  Went to [employer] services every week until she was dx with dementia 1.5 yrs ago  .  C - Community:     A - Address in Care: amenable to  services       > 50% of 70  min visit spent in chart review, face to face discussion of goals of care,  symptom assessment, coordination of care and emotional support.    HU Mccallum, ACNS-BC  Palliative Medicine  [facility]  Ext [phone]

## 2019-04-11 NOTE — ASSESSMENT & PLAN NOTE
No vent weaning for now  Daily abg  Daily cxr  Vent Mode: A/C  Oxygen Concentration (%):  [40-99] 40  Resp Rate Total:  [14 br/min-17 br/min] 14 br/min  Vt Set:  [420 mL] 420 mL  PEEP/CPAP:  [5 cmH20] 5 cmH20  Mean Airway Pressure:  [8.2 cmH20-8.7 cmH20] 8.2 cmH20

## 2019-04-11 NOTE — PLAN OF CARE
Problem: Occupational Therapy Goal  Goal: Occupational Therapy Goal  Goals set 4/8 to be addressed for 14 days with expiration date, 4/22:  Patient will increase functional independence with ADLs by performing:    Patient will demonstrate rolling to the right with max assist.  Not met   Patient will demonstrate rolling to the left with max assist.   Not met  Patient will demonstrate supine -sit with max  assist.   Not met  Patient will demonstrate squat pivot transfers with max assist.   Not met  Patient will demonstrate grooming while seated with max assist.   Not met  Patient will demonstrate upper body dressing with max assist while seated EOB.   Not met  Patient will demonstrate lower body dressing with max assist while seated EOB.   Not met  Patient will demonstrate toileting with max assist.   Not met  Patient will demonstrate bathing while seated EOB with max assist.   Not met  Patient will demonstrate ability to follow 2/5 commands.   Not met  Patient's family / caregiver will demonstrate independence and safety with assisting patient with self-care skills and functional mobility.     Not met  Patient's family / caregiver will demonstrate independence with providing ROM and changes in bed positioning.   Not met       Goals remain appropriate.  LUCIANA Sharp  4/11/2019

## 2019-04-11 NOTE — PROGRESS NOTES
"[facility]  Adult Nutrition  Progress Note    SUMMARY       Recommendations    Recommendation/Intervention:   As medically able, recommend starting TF.   Glucerna 1.5 @ goal rate 40mL/hr.   - Initiate @ 10mL/hr and increase by 10mL q4hrs, or as tolerated, until goal rate is reached.   - Hold for residuals >500mL.   - Provides 1440kcals, 79g protein and 729mL free water.     RD to monitor.    Goals: Pt to receive nutrition by RD follow up  Nutrition Goal Status: goal not met  Communication of RD Recs: reviewed with RN    Reason for Assessment    Reason For Assessment: RD follow-up  Diagnosis: stroke/CVA  Relevant Medical History: HTN, T2DM, HLD, dementia  Interdisciplinary Rounds: attended  General Information Comments: Pt remains intubated. No TF started at this time. GOC discussions ongoing with family. Pt's family reports adequate po intake, no weight loss and excellent appetite PTA. Pt's  reports pt does not follow diabetic diet restriction. Education inappropriate at this time 2/2 intubation (HgbA1c 11.4). NFPE not indicated at this time as pt does not meet critieria for malnutrition.  Nutrition Discharge Planning: unable to determine at this time    Nutrition Risk Screen    Nutrition Risk Screen: no indicators present    Nutrition/Diet History    Spiritual, Cultural Beliefs, Scientology Practices, Values that Affect Care: no  Factors Affecting Nutritional Intake: NPO, on mechanical ventilation    Anthropometrics    Temp: (!) 93.2 °F (34 °C)  Height: 5' 5" (165.1 cm)  Height (inches): 65 in  Weight Method: Bed Scale  Weight: 64.5 kg (142 lb 3.2 oz)  Weight (lb): 142.2 lb  Ideal Body Weight (IBW), Female: 125 lb  % Ideal Body Weight, Female (lb): 128 lb  BMI (Calculated): 26.7  BMI Grade: 25 - 29.9 - overweight       Lab/Procedures/Meds    Pertinent Labs Reviewed: reviewed  Pertinent Labs Comments: Na 168, Ph 2.4, POCT Glu 174-185  Pertinent Medications Reviewed: reviewed  Pertinent " Medications Comments: insulin, cardene    Estimated/Assessed Needs    Weight Used For Calorie Calculations: 64.5 kg (142 lb 3.2 oz)  Energy Calorie Requirements (kcal): 1443  Energy Need Method: [place]  Protein Requirements: 78-97g(1.2-1.5g/kg)  Weight Used For Protein Calculations: 64.5 kg (142 lb 3.2 oz)  Fluid Requirements (mL): 1mL/kcal or per MD     RDA Method (mL): 1443  CHO Requirement: 50% of total kcals      Nutrition Prescription Ordered    Current Diet Order: NPO    Evaluation of Received Nutrient/Fluid Intake    IV Fluid (mL): 0  I/O: +I/O, good UOP, LBM 4/7  % Intake of Estimated Energy Needs: 0 - 25 %  % Meal Intake: NPO    Nutrition Risk    Level of Risk/Frequency of Follow-up: (f/u 2x/week)     Assessment and Plan     Nutrition Problem  Inadequate energy intake     Related to (etiology):   NPO     Signs and Symptoms (as evidenced by):   Pt receiving <85% EEN and EPN.      Intervention:  Collaboration of nutrition care     Nutrition Diagnosis Status:   New        Monitor and Evaluation    Food and Nutrient Intake: enteral nutrition intake  Food and Nutrient Adminstration: enteral and parenteral nutrition administration  Knowledge/Beliefs/Attitudes: food and nutrition knowledge/skill  Anthropometric Measurements: weight, weight change, body mass index  Biochemical Data, Medical Tests and Procedures: electrolyte and renal panel, gastrointestinal profile, glucose/endocrine profile, inflammatory profile, lipid profile  Nutrition-Focused Physical Findings: overall appearance       Nutrition Follow-Up    RD Follow-up?: Yes

## 2019-04-11 NOTE — ASSESSMENT & PLAN NOTE
Right MCA CVA   updated and MRI reviewed with him at bedside  hemicrani consult - not a surgical candidate as per NSGY  PT  OT  VN following  Palliative care consulted for goals of care discussion - plan for family meeting for possible comfort care tomorrow  [id number]  [email or web address] CUMMINS mannitol  Serum osm

## 2019-04-11 NOTE — PLAN OF CARE
Problem: Adult Inpatient Plan of Care  Goal: Plan of Care Review  Outcome: Revised  POC reviewed with pt at 0500. Pt unable to verbalized understanding. No family at bedside. Pt's neurological status with intermintent changes throughout the night. HR 60s-70s. Tolerated ETT well. LR off due to increased Na. Mannitol administered at 0130. UO of >50 to a max of 125. No BM. Will continue to monitor. See flowsheets for full assessment and VS info

## 2019-04-11 NOTE — PROGRESS NOTES
Spoke to FARA Parra in rounds regarding pt's current neuro exam, NA levels and BP goals.  Per PA keep SBP < 180, will speak with Mariusz Garcia NP about adding free water boluses for increasing NA levels. Reported current neuro exam consist of Left pupil 3mm and right 4mm both fixed with a + right corneal and - left corneal. No mm to RUE and posturing on LUE. BLE triple flexion.  Also assessed ischemic RUE. Reported doppler used for pulses and only able to obtain brachial pulse. Has been an ongoing issue.. PA at bedside to assess and confirm neuro exam. Will continue to monitor..

## 2019-04-11 NOTE — PROGRESS NOTES
Mariusz Garcia, NP notified of pt's glucose of 185. Per NP please maintain current rate and recheck in an hour.

## 2019-04-11 NOTE — PROGRESS NOTES
[facility]  Neurocritical Care  Progress Note    Admit Date: 4/7/2019  Service Date: 04/11/2019  Length of Stay: 4    Subjective:     Chief Complaint: Embolic stroke involving right middle cerebral artery    History of Present Illness: Ms. Mahmood is 78 y.o AA female with PMHx of HTN (ran out medications), DMTII (on metformin 500 mg BID), HLD (on Atorvastatin 20 mg), Hypothyroidisms(on Levothyroxine 25 mg daily) and Dementia (started 112 years ago), who was BIB EMS due to L side weakness and Gaze deviation to R. Patient was LKN yesterday around 10 pm. Her Her  reports that he found her unresponsive around 7:30 AM this morning (although he is not exactly sure that he correctly remembers the time). She had urinated on herself during the night. EMS arrived shortly after 9 AM. Per  also, patient has been very tired and she rather wants to be at her bed for the last 2 weeks.  denies fevers, chills, cough, chest pain, sob, rhinorrhea, headache, abdominal pain or changes in BM/urinary habits, other than constipation. Patient ran out of BP medications for unknown period of time. She was only taking Atorvastatin, Metformin and Levothyroxine at home. Patient does not have history of smoker or stroke in the past. She was diagnosed with dementia 1 1/2 years ago, but she was oriented to herself, walks and talks at baseline and able to bathe, dress and feed herself.    Upon arrival to ED patient was found with R gaze deviation, LUE/LLE flaccid paralysis, and RUE/RLE able to slowly do some spontaneous movements. Patient was not verbal and no able to follow commands. Patient was stroke activated and CTH reported R MCA stroke and occlusion. Patient was out of tPA window and non-candidate for thrombectomy.   History was taken by chart review and 's interview      Hospital Course: 4/8: Mri with right MCA CVA,  updated at bedside, trend lactate, hemicrani watch  04/10/2019: loss of  pupillary reaction, given mannitol     Past Medical History:   Diagnosis Date    Breast cancer 2003/1995    Diabetes mellitus     Hyperlipidemia     Hypertension     Mild non proliferative diabetic retinopathy 10/13/2014    Nuclear sclerosis 10/13/2014    Open angle with borderline findings and low glaucoma risk in both eyes 10/13/2014    Osteoarthritis of right hip 1/7/2015     Past Surgical History:   Procedure Laterality Date    BREAST LUMPECTOMY  1995      No current facility-administered medications on file prior to encounter.      Current Outpatient Medications on File Prior to Encounter   Medication Sig Dispense Refill    aspirin 81 MG Chew Take 1 tablet (81 mg total) by mouth once daily. 90 tablet 3    atorvastatin (LIPITOR) 20 MG tablet Take 20 mg by mouth once daily.      blood sugar diagnostic, drum (ACCU-CHEK COMPACT TEST) Strp  Strip In Vitro Twice a day       levothyroxine (SYNTHROID) 25 MCG tablet Take 25 mcg by mouth once daily.      metFORMIN (GLUCOPHAGE) 500 MG tablet Take 500 mg by mouth 2 (two) times daily with meals.      MULTIVITAMIN ORAL Take by mouth. 1 Tablet Oral Every other day      benazepril-hydrochlorthiazide (LOTENSIN HCT) 20-25 mg Tab Take 1 tablet by mouth once daily.      donepezil (ARICEPT) 10 MG tablet TAKE 1 TABLET BY MOUTH EVERY DAY 90 tablet 6      Allergies: Patient has no known allergies.    Family History   Problem Relation Age of Onset    Cancer Maternal Aunt     No Known Problems Mother     No Known Problems Father     No Known Problems Sister     No Known Problems Brother     No Known Problems Maternal Uncle     No Known Problems Paternal Aunt     No Known Problems Paternal Uncle     No Known Problems Maternal Grandmother     No Known Problems Maternal Grandfather     No Known Problems Paternal Grandmother     No Known Problems Paternal Grandfather     Amblyopia Neg Hx     Blindness Neg Hx     Cataracts Neg Hx     Diabetes Neg Hx      Glaucoma Neg Hx     Hypertension Neg Hx     Macular degeneration Neg Hx     Retinal detachment Neg Hx     Strabismus Neg Hx     Stroke Neg Hx     Thyroid disease Neg Hx      Social History     Tobacco Use    Smoking status: Former Smoker     Types: Cigarettes     Last attempt to quit: 1962     Years since quittin.7    Smokeless tobacco: Never Used   Substance Use Topics    Alcohol use: Yes     Alcohol/week: 0.0 oz     Comment: former heavy use of wine? difficult to assess    Drug use: No     Review of Systems   Unable to perform ROS: Intubated     Objective:     Vitals:    Temp: (!) 93.2 °F (34 °C)  Pulse: 61  Rhythm: normal sinus rhythm  BP: (!) 160/70  MAP (mmHg): 100  CVP (mean): 5 mmHg  Resp: 14  SpO2: 99 %  Oxygen Concentration (%): 40  O2 Device (Oxygen Therapy): ventilator  Vent Mode: A/C  Set Rate: 14 bmp  Vt Set: 420 mL  PEEP/CPAP: 5 cmH20  Peak Airway Pressure: 21 cmH2O  Mean Airway Pressure: 8.2 cmH20  Plateau Pressure: 19 cmH20    Temp  Min: 91 °F (32.8 °C)  Max: 94.8 °F (34.9 °C)  Pulse  Min: 61  Max: 83  BP  Min: 139/61  Max: 191/74  MAP (mmHg)  Min: 88  Max: 110  CVP (mean)  Min: 1 mmHg  Max: 8 mmHg  Resp  Min: 0  Max: 33  SpO2  Min: 79 %  Max: 100 %  Oxygen Concentration (%)  Min: 40  Max: 99    /10 0701 -  0700  In: 1337.7 [I.V.:1337.7]  Out: 1540 [Urine:1540]   Unmeasured Output  Stool Occurrence: 0       Physical Exam   Constitutional: She appears well-developed.   HENT:   Head: Normocephalic and atraumatic.   Neck: Neck supple.   Cardiovascular: Normal rate.   Pulmonary/Chest:   Mechanical BS throughout   Abdominal: Soft.   Neurological: She is unresponsive. GCS eye subscore is 1. GCS verbal subscore is 1. GCS motor subscore is 2.   Pupils fixed BL, L 3mm, R 7mm  L corneal intact, R corneal absent  Absent OC to doll's eye  Cough/gag intact  Not breathing over vent at time of exam   Skin:   Cold RUE with ischemic change to distal digits         Assessment/Plan:      Neuro  * Embolic stroke involving right middle cerebral artery  Right MCA CVA   updated and MRI reviewed with him at bedside  hemicrani consult - not a surgical candidate as per NSGY  PT  OT  VN following  Palliative care consulted for goals of care discussion - plan for family meeting for possible comfort care tomorrow  [email or web address]  [email or web address]  TX mannitol  Serum osm      [place] coma scale total score 3-8  Due to stroke  Palliative care consulted for goals of care discussion    Brain compression  Due to stroke  See Stroke  Monitor Na  NSGY aware  hemicrani consult - not a surgical candidate    Cytotoxic cerebral edema  Due to stroke  Na goal 150-160  titrate fluids as needed    Pulmonary  Acute respiratory failure with hypoxia  No vent weaning for now  Daily abg  Daily cxr  Vent Mode: A/C  Oxygen Concentration (%):  [40-99] 40  Resp Rate Total:  [14 br/min-17 br/min] 14 br/min  Vt Set:  [420 mL] 420 mL  PEEP/CPAP:  [5 cmH20] 5 cmH20  Mean Airway Pressure:  [8.2 cmH20-8.7 cmH20] 8.2 cmH20       Cardiac/Vascular  Ischemia of hand  Consulted vascular surgery - no surgical intervention at this time.  Continue neuropaste and hot packs      Essential hypertension  Goal 100-160      Renal/  Stage 5 chronic kidney disease not on chronic dialysis  Much improved     Endocrine  Acquired hypothyroidism  Continue on home levothyroxine 25 mcg daily      Type 2 diabetes mellitus with neurologic complication  Patient with Hx of DMTII, home med Metformin (hold it)  A1c: 11.4, K: 5.9, B, B-OH 6.4 on DKA (likely 2/2 insulin deficiency)  On insulin drip, IVF maintenance  BG Q/4hour  Hypoglycemia protocol ordered    Other  Limitation of activities due to disability  PT  OT          The patient is being Prophylaxed for:  Venous Thromboembolism with: Mechanical  Stress Ulcer with: H2B  Ventilator Pneumonia with: chlorhexidine oral care    Activity Orders          Diet NPO: NPO starting at  1024        MICHAEL APODACA  MD Leroy  Neurocritical Care  [facility]

## 2019-04-11 NOTE — PLAN OF CARE
04/11/19 1145   Discharge Reassessment   Assessment Type Discharge Planning Reassessment   Provided patient/caregiver education on the expected discharge date and the discharge plan No   Do you have any problems affording any of your prescribed medications? No   Discharge Plan A Long-term acute care facility (LTAC)   Discharge Plan B Other  (tbd)   DME Needed Upon Discharge  other (see comments);none   Patient choice form signed by patient/caregiver N/A   Post-Acute Status   Post-Acute Authorization Placement   Post-Acute Placement Status Awaiting Internal Medical Clearance   Discharge Delays (!) Patient/Caregiver Education Not Complete  (Per MD, family meeting Friday (04/12/19))         Palliative Care Consult done  Per MD, patient is now a DNR/  Family meeting to take place on 04/12/19    Alicja Hussein RN, CCRN-K, [facility] [place]  Neuro-Critical Care   [phone]

## 2019-04-11 NOTE — PROGRESS NOTES
Patient's chart was reviewed by a stroke team provider.    Patient remains intubated. Mannitol given for loss of pupillary reaction. Made DNR yesterday. Plan for family discussion Friday 4/12. Palliative following.      There is no new imaging to review.  Pending diagnostics to follow up on include: NA.  For other recommendations please see our previous note completed on: 4/9/2019    There are no new recommendations at this time. Will continue to follow. Discussed patient with staff. Please contact stroke team for any questions or concerns.     Emily Wright NP  [facility]  Department of Vascular Neurology   [facility]  [phone]

## 2019-04-11 NOTE — SUBJECTIVE & OBJECTIVE
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      Current Outpatient Medications on File Prior to Encounter   Medication Sig Dispense Refill    aspirin 81 MG Chew Take 1 tablet (81 mg total) by mouth once daily. 90 tablet 3    atorvastatin (LIPITOR) 20 MG tablet Take 20 mg by mouth once daily.      blood sugar diagnostic, drum (ACCU-CHEK COMPACT TEST) Strp  Strip In Vitro Twice a day       levothyroxine (SYNTHROID) 25 MCG tablet Take 25 mcg by mouth once daily.      metFORMIN (GLUCOPHAGE) 500 MG tablet Take 500 mg by mouth 2 (two) times daily with meals.      MULTIVITAMIN ORAL Take by mouth. 1 Tablet Oral Every other day      benazepril-hydrochlorthiazide (LOTENSIN HCT) 20-25 mg Tab Take 1 tablet by mouth once daily.      donepezil (ARICEPT) 10 MG tablet TAKE 1 TABLET BY MOUTH EVERY DAY 90 tablet 6      Allergies: Patient has no known allergies.    Family History   Problem Relation Age of Onset    Cancer Maternal Aunt     No Known Problems Mother     No Known Problems Father     No Known Problems Sister     No Known Problems Brother     No Known Problems Maternal Uncle     No Known Problems Paternal Aunt     No Known Problems Paternal Uncle     No Known Problems Maternal Grandmother     No Known Problems Maternal Grandfather     No Known Problems Paternal Grandmother     No Known Problems Paternal Grandfather     Amblyopia Neg Hx     Blindness Neg Hx     Cataracts Neg Hx     Diabetes Neg Hx     Glaucoma Neg Hx     Hypertension Neg Hx      Macular degeneration Neg Hx     Retinal detachment Neg Hx     Strabismus Neg Hx     Stroke Neg Hx     Thyroid disease Neg Hx      Social History     Tobacco Use    Smoking status: Former Smoker     Types: Cigarettes     Last attempt to quit: 1962     Years since quittin.7    Smokeless tobacco: Never Used   Substance Use Topics    Alcohol use: Yes     Alcohol/week: 0.0 oz     Comment: former heavy use of wine? difficult to assess    Drug use: No     Review of Systems   Unable to perform ROS: Intubated     Objective:     Vitals:    Temp: (!) 93.2 °F (34 °C)  Pulse: 61  Rhythm: normal sinus rhythm  BP: (!) 160/70  MAP (mmHg): 100  CVP (mean): 5 mmHg  Resp: 14  SpO2: 99 %  Oxygen Concentration (%): 40  O2 Device (Oxygen Therapy): ventilator  Vent Mode: A/C  Set Rate: 14 bmp  Vt Set: 420 mL  PEEP/CPAP: 5 cmH20  Peak Airway Pressure: 21 cmH2O  Mean Airway Pressure: 8.2 cmH20  Plateau Pressure: 19 cmH20    Temp  Min: 91 °F (32.8 °C)  Max: 94.8 °F (34.9 °C)  Pulse  Min: 61  Max: 83  BP  Min: 139/61  Max: 191/74  MAP (mmHg)  Min: 88  Max: 110  CVP (mean)  Min: 1 mmHg  Max: 8 mmHg  Resp  Min: 0  Max: 33  SpO2  Min: 79 %  Max: 100 %  Oxygen Concentration (%)  Min: 40  Max: 99    04/10 0701 -  0700  In: 1337.7 [I.V.:1337.7]  Out: 1540 [Urine:1540]   Unmeasured Output  Stool Occurrence: 0       Physical Exam   Constitutional: She appears well-developed.   HENT:   Head: Normocephalic and atraumatic.   Neck: Neck supple.   Cardiovascular: Normal rate.   Pulmonary/Chest:   Mechanical BS throughout   Abdominal: Soft.   Neurological: She is unresponsive. GCS eye subscore is 1. GCS verbal subscore is 1. GCS motor subscore is 2.   Pupils fixed BL, L 3mm, R 7mm  L corneal intact, R corneal absent  Absent OC to doll's eye  Cough/gag intact  Not breathing over vent at time of exam   Skin:   Cold RUE with ischemic change to distal digits

## 2019-04-11 NOTE — NURSING
0730H- Patients hourly blood sugar was relayed to the team. Insulin therapy algorithm was not followed for now since last shift.  Rate is being ordered by the team. Will wait for the team to do their rounds and discuss if patients still need insulin algorithm. Will monitor patient.

## 2019-04-11 NOTE — ASSESSMENT & PLAN NOTE
Patient with Hx of DMTII, home med Metformin (hold it)  A1c: 11.4, K: 5.9, B, B-OH 6.4 on DKA (likely 2/2 insulin deficiency)  On insulin drip, IVF maintenance  BG Q/4hour  Hypoglycemia protocol ordered

## 2019-04-11 NOTE — PT/OT/SLP PROGRESS
Physical Therapy      Patient Name:  Sherita Mahmood   MRN:  [id number]    Pt remains intubated with overall poor prognosis per charting. OT continues to follow for treatment. Pt not appropriate for PT services at current time. Please place new orders when patient appropriate for 2 discipline therapy.     Megan Vergara, PT, DPT  4/11/2019

## 2019-04-11 NOTE — NURSING
1825H patients cyanotic arm was marked. Unable to appreciate pulse using the doppler. Patito BHAGAT was notified. No new order. Nitro paste and warm compress management done. Will monitor patient.

## 2019-04-11 NOTE — PLAN OF CARE
Problem: Adult Inpatient Plan of Care  Goal: Plan of Care Review  Outcome: Ongoing (interventions implemented as appropriate)  POC reviewed with pt and family at 1400. Pt unable to verbalized understanding. Education was given to the family. Questions and concerns addressed. No acute events today.Sodium level still high. Na check done every 4 hours and CMP every 6 hours. Patient is still on insulin drip. Will continue to monitor. See flowsheets for full assessment and VS info.

## 2019-04-11 NOTE — NURSING
1500H CMP, Serum osmolality and Sodium labs from 1230H results are not yet in the Epic. Followed up with lab, Krystina stated that they did not receive the blood specime. New sets of blood sample was sent to the lab. Will follow up.

## 2019-04-11 NOTE — ASSESSMENT & PLAN NOTE
Consulted vascular surgery - no surgical intervention at this time.  Continue neuropaste and hot packs

## 2019-04-11 NOTE — PLAN OF CARE
Problem: Adult Inpatient Plan of Care  Goal: Plan of Care Review  Outcome: Ongoing (interventions implemented as appropriate)  POC reviewed with pt's  at 1600. Pt's  verbalized understanding, but needs reinforcement. Questions and concerns addressed. Pt received Mannitol at 0930, however pupils still 4mm and fixed. Pt made DNR by  today. Will continue to monitor. See flowsheets for full assessment and VS info

## 2019-04-11 NOTE — PT/OT/SLP PROGRESS
Occupational Therapy   Treatment    Name: Sherita Mahmood  MRN: [id number]  Admitting Diagnosis:  Embolic stroke involving right middle cerebral artery       Recommendations:     Discharge Recommendations: (pending medical status)  Discharge Equipment Recommendations:  hospital bed  Barriers to discharge:  Inaccessible home environment, Decreased caregiver support    Assessment:     Sherita Mahmood is a 79 y.o. female with a medical diagnosis of Embolic stroke involving right middle cerebral artery.  She presents with performance deficits affecting function are weakness, impaired self care skills, impaired balance, decreased coordination, decreased safety awareness, decreased ROM, impaired endurance, impaired functional mobilty, impaired sensation, gait instability, impaired cognition, decreased lower extremity function, decreased upper extremity function, visual deficits, abnormal tone, impaired fine motor, impaired coordination.     Rehab Prognosis:  Good; patient would benefit from acute skilled OT services to address these deficits and reach maximum level of function.       Plan:     Patient to be seen 3 x/week to address the above listed problems via self-care/home management, neuromuscular re-education, cognitive retraining, therapeutic activities, therapeutic exercises, sensory integration  · Plan of Care Expires: 05/06/19  · Plan of Care Reviewed with: patient    Subjective     Pain/Comfort:  · Pain Rating 1: 0/10  · Pain Rating Post-Intervention 1: 0/10    Objective:     Communicated with: Nurse prior to session.  Patient found supine with arterial line, bed alarm, blood pressure cuff, central line, telemetry, SCD, pulse ox (continuous), peripheral IV, pressure relief boots, levy catheter(OG tube) upon OT entry to room.    General Precautions: Standard, aspiration, fall, NPO(do not touch patient's hands 2* nitro paste)   Orthopedic Precautions:N/A   Braces: N/A     Occupational Performance:     Bed Mobility:     · Patient completed Rolling/Turning to Left with  total assistance  · Patient completed Rolling/Turning to Right with total assistance     Functional Mobility/Transfers:  · Dependent drawsheet transfers    Activities of Daily Living:  · Feeding:  NPO      [facility] 6 Click ADL: 6    Treatment & Education:  Patient education provided on role of OT and need for continued OT upon discharge.   Continued education, patient/ family training recommended. Daily orientation provided.  PROM performed bilateral UE/LEs one set x 6 rep in all planes of motion with stretches provided at end range; assistance and facilitation provided for upward rotation of the scapula during shoulder flexion and abduction.  Patient kept eyes closed 100% of the session.  Patient unable to follow commands.  Positioning provided for midline orientation with bilateral UEs elevated and heels lifted off mattress.  Family present during the session.  White board updated in patient's room    Patient left supine with all lines intact and call button in reachEducation:      GOALS:   Multidisciplinary Problems     Occupational Therapy Goals        Problem: Occupational Therapy Goal    Goal Priority Disciplines Outcome Interventions   Occupational Therapy Goal     OT, PT/OT     Description:  Goals set 4/8 to be addressed for 14 days with expiration date, 4/22:  Patient will increase functional independence with ADLs by performing:    Patient will demonstrate rolling to the right with max assist.  Not met   Patient will demonstrate rolling to the left with max assist.   Not met  Patient will demonstrate supine -sit with max  assist.   Not met  Patient will demonstrate squat pivot transfers with max assist.   Not met  Patient will demonstrate grooming while seated with max assist.   Not met  Patient will demonstrate upper body dressing with max assist while seated EOB.   Not met  Patient will demonstrate lower body dressing with max assist while seated EOB.    Not met  Patient will demonstrate toileting with max assist.   Not met  Patient will demonstrate bathing while seated EOB with max assist.   Not met  Patient will demonstrate ability to follow 2/5 commands.   Not met  Patient's family / caregiver will demonstrate independence and safety with assisting patient with self-care skills and functional mobility.     Not met  Patient's family / caregiver will demonstrate independence with providing ROM and changes in bed positioning.   Not met                        Time Tracking:     OT Date of Treatment: 04/11/19  OT Start Time: 0425  OT Stop Time: 0435  OT Total Time (min): 10 min    Billable Minutes:Therapeutic Exercise 10    LUCIANA Sharp  4/11/2019

## 2019-04-11 NOTE — PROGRESS NOTES
Reported to Kanika Devi NP decreasing Plt count 91 and phos of 2.4. No new orders at this time. Also reported insulin gtt suspended at 0100 per algorithm BG 96. Rechecked at 0115,106 and 0200, 138. Per orders coming to bedside w/ Mariusz Garcia NP.    NP reviewed CBG trends and previous insulin gtt rates per order turn gtt on and start at 0.5 u/hr. If next CBG < 100 or > 160. Please call.

## 2019-04-12 NOTE — SUBJECTIVE & OBJECTIVE
Interval History:  D/c statin, started d5w @ 75 mL/hr rate, family discussion for POC pending     Review of Systems   Unable to perform ROS: Patient unresponsive       Objective:     Vitals:  Temp: 97.7 °F (36.5 °C)  Pulse: 72  Rhythm: normal sinus rhythm  BP: 135/63  MAP (mmHg): 90  CVP (mean): 2 mmHg  Resp: 14  SpO2: 98 %  Oxygen Concentration (%): 41  O2 Device (Oxygen Therapy): ventilator  Vent Mode: A/C  Set Rate: 14 bmp  Vt Set: 420 mL  PEEP/CPAP: 5 cmH20  Peak Airway Pressure: 24 cmH2O  Mean Airway Pressure: 8.5 cmH20  Plateau Pressure: 19 cmH20    Temp  Min: 93.2 °F (34 °C)  Max: 98.2 °F (36.8 °C)  Pulse  Min: 61  Max: 91  BP  Min: 133/63  Max: 184/73  MAP (mmHg)  Min: 89  Max: 110  CVP (mean)  Min: 2 mmHg  Max: 6 mmHg  Resp  Min: 11  Max: 31  SpO2  Min: 95 %  Max: 100 %  Oxygen Concentration (%)  Min: 40  Max: 41    04/11 0701 - 04/12 0700  In: 1066.5 [I.V.:266.5]  Out: 1140 [Urine:1140]   Unmeasured Output  Stool Occurrence: 1       Physical Exam   Constitutional: She appears well-developed and well-nourished. She is intubated.   HENT:   Head: Normocephalic and atraumatic.   Cardiovascular: Normal rate and regular rhythm.   Pulmonary/Chest: She is intubated.   Abdominal: Soft. Normal appearance.   Neurological:   Pt intubated, unresponsive, not following commands   E1V(T1)M2 GCS 3   Pupils fixed   L corneal intact  R corneal absent  Cough/gag intact  RUE - no movement   LUE - flexor posturing   RLE - flexor posturing   LLE - flexor posturing          Medications:  Continuous  dextrose 5 % Last Rate: 75 mL/hr at 04/12/19 1301   insulin (HUMAN R) infusion (adults) Last Rate: 0.9 Units/hr (04/12/19 1012)   nicardipine Last Rate: Stopped (04/09/19 1000)   Scheduled  chlorhexidine 15 mL BID   famotidine 20 mg Daily   levothyroxine 25 mcg Before breakfast   nitroGLYCERIN 2% TD oint 0.5 inch Q6H   polyethylene glycol 17 g Daily   senna-docusate 8.6-50 mg 1 tablet Daily   PRN  dextrose 50% 12.5 g PRN   dextrose  50% 25 g PRN   sodium chloride 0.9% 10 mL PRN     Today I personally reviewed pertinent medications, lines/drains/airways, imaging, cardiology results, laboratory results, microbiology results, notably:    Diet  Diet NPO  Diet NPO

## 2019-04-12 NOTE — CHAPLAIN
Facts (Spiritual Perception of Illness):  accepts this is the end of her life, does not want to do any further medical procedures.  She is not in pain now, let her go peacefully.    Feelings (Emotions/Experiences/Coping): He is grieving, feeling sad, but does not want her to suffer needlessly. Feeling grateful for the 20 years they had together and those memories.        Family/Friends: He's waiting for family to arrive, which they have begun to do, and wants to be sure they understand his decision and are comfortable with it, although he is clear it is his decision to make.    Martha (Belief/Meaning/Philosophy):  and pt are both Zoroastrian with strong martha.  Per , pt was a praying woman and a beautiful person. Simply wants his wife to return home to God.    Future (Spiritual Care Plan of Action): Will continue to offer support as needed.

## 2019-04-12 NOTE — PROGRESS NOTES
[facility]  Neurocritical Care  Progress Note    Admit Date: 4/7/2019  Service Date: 04/12/2019  Length of Stay: 5    Subjective:     Chief Complaint: Embolic stroke involving right middle cerebral artery    History of Present Illness: Ms. Mahmood is 78 y.o AA female with PMHx of HTN (ran out medications), DMTII (on metformin 500 mg BID), HLD (on Atorvastatin 20 mg), Hypothyroidisms(on Levothyroxine 25 mg daily) and Dementia (started 112 years ago), who was BIB EMS due to L side weakness and Gaze deviation to R. Patient was LKN yesterday around 10 pm. Her Her  reports that he found her unresponsive around 7:30 AM this morning (although he is not exactly sure that he correctly remembers the time). She had urinated on herself during the night. EMS arrived shortly after 9 AM. Per  also, patient has been very tired and she rather wants to be at her bed for the last 2 weeks.  denies fevers, chills, cough, chest pain, sob, rhinorrhea, headache, abdominal pain or changes in BM/urinary habits, other than constipation. Patient ran out of BP medications for unknown period of time. She was only taking Atorvastatin, Metformin and Levothyroxine at home. Patient does not have history of smoker or stroke in the past. She was diagnosed with dementia 1 1/2 years ago, but she was oriented to herself, walks and talks at baseline and able to bathe, dress and feed herself.    Upon arrival to ED patient was found with R gaze deviation, LUE/LLE flaccid paralysis, and RUE/RLE able to slowly do some spontaneous movements. Patient was not verbal and no able to follow commands. Patient was stroke activated and CTH reported R MCA stroke and occlusion. Patient was out of tPA window and non-candidate for thrombectomy.   History was taken by chart review and 's interview      Hospital Course: 4/8: Mri with right MCA CVA,  updated at bedside, trend lactate, hemicrani watch  04/10/2019: loss of  pupillary reaction, given mannitol   4/12: d/c statin, started d5W 75 ml/hr, family discussion POC pending     Interval History:  D/c statin, started d5w @ 75 mL/hr rate, family discussion for POC pending     Review of Systems   Unable to perform ROS: Patient unresponsive       Objective:     Vitals:  Temp: 97.7 °F (36.5 °C)  Pulse: 72  Rhythm: normal sinus rhythm  BP: 135/63  MAP (mmHg): 90  CVP (mean): 2 mmHg  Resp: 14  SpO2: 98 %  Oxygen Concentration (%): 41  O2 Device (Oxygen Therapy): ventilator  Vent Mode: A/C  Set Rate: 14 bmp  Vt Set: 420 mL  PEEP/CPAP: 5 cmH20  Peak Airway Pressure: 24 cmH2O  Mean Airway Pressure: 8.5 cmH20  Plateau Pressure: 19 cmH20    Temp  Min: 93.2 °F (34 °C)  Max: 98.2 °F (36.8 °C)  Pulse  Min: 61  Max: 91  BP  Min: 133/63  Max: 184/73  MAP (mmHg)  Min: 89  Max: 110  CVP (mean)  Min: 2 mmHg  Max: 6 mmHg  Resp  Min: 11  Max: 31  SpO2  Min: 95 %  Max: 100 %  Oxygen Concentration (%)  Min: 40  Max: 41    04/11 0701 - 04/12 0700  In: 1066.5 [I.V.:266.5]  Out: 1140 [Urine:1140]   Unmeasured Output  Stool Occurrence: 1       Physical Exam   Constitutional: She appears well-developed and well-nourished. She is intubated.   HENT:   Head: Normocephalic and atraumatic.   Cardiovascular: Normal rate and regular rhythm.   Pulmonary/Chest: She is intubated.   Abdominal: Soft. Normal appearance.   Neurological:   Pt intubated, unresponsive, not following commands   E1V(T1)M2 GCS 3   Pupils fixed   L corneal intact  R corneal absent  Cough/gag intact  RUE - no movement   LUE - flexor posturing   RLE - flexor posturing   LLE - flexor posturing          Medications:  Continuous  dextrose 5 % Last Rate: 75 mL/hr at 04/12/19 1301   insulin (HUMAN R) infusion (adults) Last Rate: 0.9 Units/hr (04/12/19 1012)   nicardipine Last Rate: Stopped (04/09/19 1000)   Scheduled  chlorhexidine 15 mL BID   famotidine 20 mg Daily   levothyroxine 25 mcg Before breakfast   nitroGLYCERIN 2% TD oint 0.5 inch Q6H    polyethylene glycol 17 g Daily   senna-docusate 8.6-50 mg 1 tablet Daily   PRN  dextrose 50% 12.5 g PRN   dextrose 50% 25 g PRN   sodium chloride 0.9% 10 mL PRN     Today I personally reviewed pertinent medications, lines/drains/airways, imaging, cardiology results, laboratory results, microbiology results, notably:    Diet  Diet NPO  Diet NPO          Assessment/Plan:     Neuro  * Embolic stroke involving right middle cerebral artery  Right MCA CVA   updated and MRI reviewed with him at bedside  hemicrani consult - not a surgical candidate as per NSGY  PT/ OT  VN following  Palliative care consulted for goals of care discussion - plan for family meeting for possible comfort care tomorrow  D5W @75 mL/hr  TF@[email or web address]    [place] coma scale total score 3-8  Due to stroke  Palliative care consulted for goals of care discussion    Brain compression  Due to stroke  See Stroke  Monitor Na  NSGY aware  hemicrani consult - not a surgical candidate    Cytotoxic cerebral edema  Due to stroke  Na goal 150-160  titrate fluids as needed    Pulmonary  Acute respiratory failure with hypoxia  No vent weaning for now  Daily abg  Daily cxr  Vent Mode: A/C  Oxygen Concentration (%):  [40-41] 41  Resp Rate Total:  [14 br/min-22 br/min] 14 br/min  Vt Set:  [420 mL] 420 mL  PEEP/CPAP:  [5 cmH20] 5 cmH20  Mean Airway Pressure:  [8.1 cmH20-10 cmH20] 8.5 cmH20     Cardiac/Vascular  Ischemia of hand  Consulted vascular surgery - no surgical intervention at this time.  Continue neuropaste and hot packs    Essential hypertension  Goal 100-160    Endocrine  Acquired hypothyroidism  Continue on home levothyroxine 25 mcg daily    Type 2 diabetes mellitus with neurologic complication  Patient with Hx of DMTII, home med Metformin (hold it)  A1c: 11.4, K: 5.9, B, B-OH 6.4 on DKA (likely 2/2 insulin deficiency)  On insulin drip, IVF maintenance  BG Q/4hour  Hypoglycemia protocol ordered    Other  Limitation of activities due to  disability  PT/OT    Hypernatremia present on admission     D5W @75 mL/hr     The patient is being Prophylaxed for:  Venous Thromboembolism with: Mechanical  Stress Ulcer with: None  Ventilator Pneumonia with: chlorhexidine oral care    Activity Orders          Diet NPO: NPO starting at 04/07 1024        DNR   Level 3  I have spent 35 min with this patient, with over 50% of this time spent coordinating care and speaking with the family      Romain Mackey PA-C  Neurocritical Care  [facility]

## 2019-04-12 NOTE — PHYSICIAN QUERY
PT Name: Sherita Mahmood  MR #: [id number]    Physician Query Form - Respiratory Condition Clarification      CDS/: Estiven Stallings               Contact information: [phone]    This form is a permanent document in the medical record.    Query Date: April 12, 2019    By submitting this query, we are merely seeking further clarification of documentation. Please utilize your independent clinical judgment when addressing the question(s) below.    The Medical record contains the following   Indicators   Supporting Clinical Findings Location in Medical Record      SOB, BRADSHAW, Wheezing, Productive Cough, Use of Accessory Muscles, etc.     x   Acute/Chronic Illness Embolic stroke involving right middle cerebral artery  Right MCA CVA   updated and MRI reviewed with him at bedside  hemicrani watch  PT  OT  VN following     Progress note Neuro CC 4/8 ([place])   x   Radiology Findings FINDINGS:  Indwelling devices remain in good location.    Mediastinal structures are midline. Cardiac silhouette and pulmonary vascular distribution are normal.  Calcific plaque is present at the level of the arch in this 79-year-old woman.  I detect no pulmonary edema.    Lung volumes are normal and symmetric. I detect no pulmonary disease, pleural fluid, lymph node enlargement, pneumothorax, pneumomediastinum, pneumoperitoneum or significant osseous abnormality.  Degenerative changes are present at the acromioclavicular joints, left worse than right.         Chest Xray 4/9   x   Respiratory Distress or Failure Pulmonary  Acute respiratory failure with hypoxia  No vent weaning for now  Daily abg  Daily cxr  Vent Mode: A/C  Oxygen Concentration (%):  [] 40  Resp Rate Total:  [14 br/min-31 br/min] 30 br/min  Vt Set:  [420 mL-450 mL] 420 mL  PEEP/CPAP:  [5 cmH20] 5 cmH20  Mean Airway Pressure:  [7.8 koV04-43 cmH20] 11 cmH20   Discussed with  may need trach and peg in future     Progress note Neuro CC 4/8 ([place])       Hypoxia or Hypercapnia        RR         ABGs         O2 sat     x   BiPAP/Intubation Intubation  Date/Time: 4/7/2019 10:26 AM    Indications: airway protection  Intubation method: video-assisted     ED 4/7      Supplemental O2        Home O2, Oxygen Dependence        Treatment        Other       Respiratory failure can be acute, chronic or both. It is generally further specified as hypoxic, hypercapnic or both. Lastly, it is important to identify an etiology, if known or suspected.   References::  https://www.acphospitalist.org/archives/2013/10/coding.htm http://[employer]/acute-respiratory-failure-know     The clinical guidelines noted below are only system guidelines, and do not replace the providers clinical judgment.    Provider, please specify diagnosis or diagnoses associated with above clinical findings.   [  x ] Acute Respiratory Failure with Hypoxia - ABG pO2 < 60 mmHg or O2 sat of 88% on RA and respiratory symptoms documented   [   ] Other Acute Respiratory Failure     [   ] Acute Respiratory Insufficiency - Generally describes less severe respiratory symptoms and measurements (pO2, SpO2, pH, and pCO2) NOT meeting criteria for respiratory failure     [   ] Acute Respiratory Distress - Generally describes less severe respiratory symptoms (tachypnea, in respiratory distress, increased work of breathing, unable to speak in complete sentences, labored breathing, use of accessory muscles, RR> 24, cyanosis, dyspnea, wheezing, stridor, lethargy) without sufficient measurements (pO2, SpO2, pH, and pCO2) to meet criteria for respiratory failure    [   ] Hypoxia Only   [   ] No Respiratory Failure, Maintained on Vent for Routine Care or Airway Protection -  purposely intubated for airway protection (e.g.: angioedema, stroke, trauma); without meeting the criteria for respiratory failure.    [   ] Other Respiratory Diagnosis (please specify): _________________________________   [   ]  Clinically  Undetermined       Please document in your progress notes daily for the duration of treatment until resolved and include in your discharge summary.

## 2019-04-12 NOTE — NURSING
1500H - patient with no corneal reflex, no gag and weak gag. Cindy BHAGAT was notified. Will monitor patient.

## 2019-04-12 NOTE — PLAN OF CARE
Patient's chart was reviewed by a stroke team provider.  Patient with possible withdraw of care pending family discussion. Now a DNR, family planning and meeting with palliative care  There is no new imaging to review.  Pending diagnostics to follow up on include: none   For other recommendations please see our previous note completed on: 4/9/19.      There are no new recommendations at this time. Will sign off. Discussed patient with staff. Please contact stroke team for any questions or concerns.       Nely Jeffers PA-C  Vascular Neurology  [phone]

## 2019-04-12 NOTE — PLAN OF CARE
Problem: Adult Inpatient Plan of Care  Goal: Plan of Care Review  POC reviewed with pt and family at 1400. Pt unable to verbalized understanding. Questions and concerns addressed. Patient has no corneal and gag reflex and only has weak cough. Patient had episode of bradycardia and hypotension. Atropine was given and levophed drip was started today. Patient with DNR status.  Will continue to monitor. See flowsheets for full assessment and VS info.

## 2019-04-12 NOTE — SUBJECTIVE & OBJECTIVE
Interval History:     Past Medical History:   Diagnosis Date    Breast cancer     Diabetes mellitus     Hyperlipidemia     Hypertension     Mild non proliferative diabetic retinopathy 10/13/2014    Nuclear sclerosis 10/13/2014    Open angle with borderline findings and low glaucoma risk in both eyes 10/13/2014    Osteoarthritis of right hip 2015       Past Surgical History:   Procedure Laterality Date    BREAST LUMPECTOMY         Review of patient's allergies indicates:  No Known Allergies    Medications:  Continuous Infusions:   dextrose 5 % 75 mL/hr at 19 1101    insulin (HUMAN R) infusion (adults) 0.9 Units/hr (19 1012)    nicardipine Stopped (19 1000)     Scheduled Meds:   chlorhexidine  15 mL Mouth/Throat BID    famotidine  20 mg Per OG tube Daily    levothyroxine  25 mcg Per OG tube Before breakfast    nitroGLYCERIN 2% TD oint  0.5 inch Topical (Top) Q6H    polyethylene glycol  17 g Per G Tube Daily    senna-docusate 8.6-50 mg  1 tablet Per G Tube Daily     PRN Meds:dextrose 50%, dextrose 50%, sodium chloride 0.9%    Family History     Problem Relation (Age of Onset)    Cancer Maternal Aunt    No Known Problems Mother, Father, Sister, Brother, Maternal Uncle, Paternal Aunt, Paternal Uncle, Maternal Grandmother, Maternal Grandfather, Paternal Grandmother, Paternal Grandfather        Tobacco Use    Smoking status: Former Smoker     Types: Cigarettes     Last attempt to quit: 1962     Years since quittin.7    Smokeless tobacco: Never Used   Substance and Sexual Activity    Alcohol use: Yes     Alcohol/week: 0.0 oz     Comment: former heavy use of wine? difficult to assess    Drug use: No    Sexual activity: Yes     Partners: Male     Birth control/protection: Post-menopausal       Review of Systems   Unable to perform ROS: Intubated     Objective:     Vital Signs (Most Recent):  Temp: 97.9 °F (36.6 °C) (19 1133)  Pulse: 81 (19  1133)  Resp: 16 (04/12/19 1133)  BP: (!) 142/63 (04/12/19 1133)  SpO2: 97 % (04/12/19 1133) Vital Signs (24h Range):  Temp:  [93.2 °F (34 °C)-98.2 °F (36.8 °C)] 97.9 °F (36.6 °C)  Pulse:  [61-91] 81  Resp:  [11-31] 16  SpO2:  [97 %-100 %] 97 %  BP: (133-184)/(62-77) 142/63     Weight: 64.5 kg (142 lb 3.2 oz)  Body mass index is 23.66 kg/m².    Review of Symptoms  Symptom Assessment (ESAS 0-10 scale)   ESAS 0 1 2 3 4 5 6 7 8 9 10   Pain              Dyspnea              Anxiety              Nausea              Depression               Anorexia              Fatigue              Insomnia              Restlessness               Agitation              CAM / Delirium __ --  ___+   Constipation     __ --  ___+   Diarrhea           __ --  ___+  Bowel Management Plan (BMP): No    Comments: intubated, unable to complete ESAS    Pain Assessment:     OME in 24 hours: 0    Performance Status: 10    ECOG Performance Status Grade: 4 - Completely disabled    Physical Exam   Constitutional: No distress.   Cardiovascular: Normal rate, regular rhythm and normal heart sounds.   Pulmonary/Chest:   Intubated    Abdominal: Soft. Bowel sounds are normal.   Musculoskeletal: She exhibits edema.   Neurological:   Unresponsive    Skin:   Right arm cool to touch,  Ischemic    Nursing note and vitals reviewed.      Significant Labs: All pertinent labs within the past 24 hours have been reviewed.  CBC:   Recent Labs   Lab 04/12/19  0356   WBC 19.01*   HGB 8.6*   HCT 30.1*   MCV 78*   PLT 88*     BMP:  Recent Labs   Lab 04/12/19  0356 04/12/19  0812   *  --    *  173* 174*   K 3.8  --    CL >130*  --    CO2 22*  --    BUN 37*  --    CREATININE 1.6*  --    CALCIUM 9.2  --    MG 2.6  --      LFT:  Lab Results   Component Value Date     (H) 04/12/2019    ALKPHOS 182 (H) 04/12/2019    BILITOT 1.0 04/12/2019     Albumin:   Albumin   Date Value Ref Range Status   04/12/2019 1.8 (L) 3.5 - 5.2 g/dL Final     Protein:   Total Protein    Date Value Ref Range Status   2019 5.9 (L) 6.0 - 8.4 g/dL Final     Lactic acid:   Lab Results   Component Value Date    LACTATE 2.7 (H) 2019    LACTATE 3.7 (HH) 2019       Significant Imaging: I have reviewed all pertinent imaging results/findings within the past 24 hours.    Advance Care Planning   Advanced Directives::  Living Will: No  LaPOST: No  Do Not Resuscitate Status: No  Medical Power of : No    Decision-Making Capacity: Family answered questions       Living Arrangements: Lives with spouse    Psychosocial/Cultural:  to uKrt Silverman 14 yrs,  Second marriage.  First  ,  No children.  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     Spiritual:     F- Martha and Belief:  Samaritan     I - Importance:  Went to Hindu services every week until she was dx with dementia 1.5 yrs ago  .  C - Community:     A - Address in Care: amenable to  services

## 2019-04-12 NOTE — ASSESSMENT & PLAN NOTE
Palliative Medicine follow up to goals of care.  Palliative medicine APRN met at bedside with , sister Saji, two nieces and two nephews.  Palliative care reintroduced to family.      Impression:  Ms. Olivier is a 78 yo lady admitted to neuro critical care  4/7 with R MCA and PCA stroke after being found unresponsive at home. No tPA or thrombectomy. Found to have hemorrhagic conversion. She is intubated, unresponsive. Right arm cool to touch and ischemic.  Appears to be comfortable, no facial grimacing or moaning.  No acute distress.      Symptom Management: Withdrawal of Life Support     Pain/Dyspnea:  Patient is opiate naive  Morphine non-titratable continuous infusion at 1 mg/hr.   Morphine 1 mg IV bolus dose just prior to extubation - may bolus through pump and   Morphine 1 mg IV  bolus every 15 minutes as needed for pain or dyspnea - may bolus through pump     If patient requires four or more bolus doses of morphine in one hour, consider increasing  Non titratable continuous morphine dose to 2 mg/hr and adjust morphine bolus dose to 2 mg every 15 mins as needed for pain/dyspnea   Anxiety/Agitation   Lorazepam 0.5 mg IVP just prior to extubation and   Lorazepam 0.5 mg IVP every 30 minutes as needed for anxiety/agitation  Secretion Control   Glycopyrrolate 0.2 mg IVP prior to extubation and   Glycopyrrolate 0.2 mg IVP four times per day as needed for secretion control         Advanced Care Planning:   - No advanced directives received.   - Next of kin for medical decision making:  Kurt Silverman [phone]   -  Kurt appears to have better understanding of current clinical condition   - DNR  per primary team      Goals of Care   -Family meeting this AM with the patient's , Kurt, sister Saji, nieces and nephews,  Dr. Oconnell and palliative medicine APRN   -  Dr. Oconnell provided family with review of clinical condition.  Gently explained to the family more aggressive treatments  would carry more risks than benefits. Explained Mrs. Mahmood has not responded to treatments and her condition continues to worsen.  - The patient's  has made a decision with withdraw life support.  At first it seemed the patient's family (sisters, nieces, nephews)  were not amenable to this plan.  After brief discussion with palliative care APRN, the family states they are in agreement.  They are just shocked and emotionally distressed by how suddenly she became ill.    - Palliative care APRN gently explained the withdrawal of life support process.  Mr. Hicks has made decision for withdrawal on Saturday, April 13.    - Intense emotional support.     Plan/Recommendation  - See above withdrawal of life support symptom management recommendations    - Please notify hospital  for emotional support  prior to withdrawal.   - If patient survives extubation and remains stable for transport - consider transition to general inpatient hospice bed   - Bereavement tray   - emotional support     Primary team notified of above goals of care and symptom management recommendations               -

## 2019-04-12 NOTE — PLAN OF CARE
Called to bedside concerning patient's code status. Patient's niece, Mery, states that she does not want the patient to have chest compressions and that the patient's wishes would be consistent with Do Not Resuscitate order that is currently active. She states that she was present for family meeting today and that everything is consistent with the patient's wishes at this time, but she is concerned about the marital status of the patient. By the end of the discussion, the conclusion found is that the patient's wishes are currently being respected and adhered to, but the only question is the marital status of the patient. Mery stated that her concerns and quarrels were with Kurt and not with the patient's care plan. I expressed that our main concern is that Sherita Mahmood's wishes are being adhered to and that we are available should this ever be in question.

## 2019-04-12 NOTE — ASSESSMENT & PLAN NOTE
No vent weaning for now  Daily abg  Daily cxr  Vent Mode: A/C  Oxygen Concentration (%):  [40-41] 41  Resp Rate Total:  [14 br/min-22 br/min] 14 br/min  Vt Set:  [420 mL] 420 mL  PEEP/CPAP:  [5 cmH20] 5 cmH20  Mean Airway Pressure:  [8.1 cmH20-10 cmH20] 8.5 cmH20

## 2019-04-12 NOTE — PLAN OF CARE
POC reviewed with pt at 0400. Pt unable to verbalized understanding. Questions and concerns addressed. No acute events today. Pt progressing toward goals. Will continue to monitor. See flowsheets for full assessment and VS info.

## 2019-04-12 NOTE — ASSESSMENT & PLAN NOTE
Right MCA CVA   updated and MRI reviewed with him at bedside  hemicrani consult - not a surgical candidate as per NSGY  PT  OT  VN following  Palliative care consulted for goals of care discussion - plan for family meeting for possible comfort care tomorrow  [id number]  [email or web address]

## 2019-04-12 NOTE — PROGRESS NOTES
[facility]  Palliative Medicine  Progress Note    Patient Name: Sherita Mahmood  MRN: [id number]  Admission Date: 4/7/2019  Hospital Length of Stay: 5 days  Code Status: DNR   Attending Provider: Rob Oconnell MD  Consulting Provider: VINEET Mckeon  Primary Care Physician: Primary Doctor No  Principal Problem:Embolic stroke involving right middle cerebral artery    Patient information was obtained from patient and relative(s).      Assessment/Plan:     Palliative care encounter  Palliative Medicine follow up to goals of care.  Palliative medicine APRN met at bedside with , sister Saji, two nieces and two nephews.  Palliative care reintroduced to family.      Impression:  Ms. Olivier is a 80 yo lady admitted to neuro critical care  4/7 with R MCA and PCA stroke after being found unresponsive at home. No tPA or thrombectomy. Found to have hemorrhagic conversion. She is intubated, unresponsive. Right arm cool to touch and ischemic.  Appears to be comfortable, no facial grimacing or moaning.  No acute distress.      Symptom Management: Withdrawal of Life Support     Pain/Dyspnea:  Patient is opiate naive  Morphine non-titratable continuous infusion at 1 mg/hr.   Morphine 1 mg IV bolus dose just prior to extubation - may bolus through pump and   Morphine 1 mg IV  bolus every 15 minutes as needed for pain or dyspnea - may bolus through pump     If patient requires four or more bolus doses of morphine in one hour, consider increasing  Non titratable continuous morphine dose to 2 mg/hr and adjust morphine bolus dose to 2 mg every 15 mins as needed for pain/dyspnea   Anxiety/Agitation   Lorazepam 0.5 mg IVP just prior to extubation and   Lorazepam 0.5 mg IVP every 30 minutes as needed for anxiety/agitation  Secretion Control   Glycopyrrolate 0.2 mg IVP prior to extubation and   Glycopyrrolate 0.2 mg IVP four times per day as needed for secretion control         Advanced Care  Planning:   - No advanced directives received.   - Next of kin for medical decision making:  Kurt Silverman [phone]   -  Kurt appears to have better understanding of current clinical condition   - DNR  per primary team      Goals of Care   -Family meeting this AM with the patient's , Kurt, sister Saji, nieces and nephews,  Dr. Oconnell and palliative medicine APRN   -  Dr. Oconnell provided family with review of clinical condition.  Gently explained to the family more aggressive treatments would carry more risks than benefits. Explained Mrs. Mahmood has not responded to treatments and her condition continues to worsen.  - The patient's  has made a decision with withdraw life support.  At first it seemed the patient's family (sisters, nieces, nephews)  were not amenable to this plan.  After brief discussion with palliative care APRN, the family states they are in agreement.  They are just shocked and emotionally distressed by how suddenly she became ill.    - Palliative care APRN gently explained the withdrawal of life support process.  Mr. Hicks has made decision for withdrawal on Saturday, April 13.    - Intense emotional support.     Plan/Recommendation  - See above withdrawal of life support symptom management recommendations    - Please notify hospital  for emotional support  prior to withdrawal.   - If patient survives extubation and remains stable for transport - consider transition to general inpatient hospice bed   - Bereavement tray   - emotional support     Primary team notified of above goals of care and symptom management recommendations               -        I will follow-up with patient. Please contact us if you have any additional questions.    Subjective:     Chief Complaint:   Chief Complaint   Patient presents with    Extremity Weakness     found this AM by family with left side weakness and right side gaze, LSN 1000pm yesterday, VAN +       HPI:   Ms.  Timoteo is 78 y.o lady with PMHx of HTN (ran out medications), DMTII (on metformin 500 mg BID), HLD (on Atorvastatin 20 mg), Hypothyroidisms(on Levothyroxine 25 mg daily) and Dementia (started 1.5  years ago). Presented to [facility] via EMS with c/o  L side weakness and Gaze deviation to R.     As reported by  was last known in normal state on 4/6/19 around 10 pm.   states  he found her unresponsive around 7:30 AM this morning (although he is not exactly sure that he correctly remembers the time). She had urinated on herself during the night. EMS arrived shortly after 9 AM. Per  also, patient has been very tired and she rather wants to be at her bed for the last 2 weeks.      denies fevers, chills, cough, chest pain, sob, rhinorrhea, headache, abdominal pain or changes in BM/urinary habits, other than constipation. Patient ran out of BP medications for unknown period of time. She was only taking Atorvastatin, Metformin and Levothyroxine at home. Patient does not have history of smoker or stroke in the past. She was diagnosed with dementia 1.5 years ago, but she was oriented to herself, walks and talks at baseline and able to bathe, dress and feed herself.      Upon arrival to ED patient was found with R gaze deviation, LUE/LLE flaccid paralysis, and RUE/RLE able to slowly do some spontaneous movements. Patient was not verbal and no able to follow commands. Patient was stroke activated and CTH reported R MCA stroke and occlusion. Patient was out of tPA window and non-candidate for thrombectomy.           Hospital Course:  No notes on file    Interval History:     Past Medical History:   Diagnosis Date    Breast cancer 2003/1995    Diabetes mellitus     Hyperlipidemia     Hypertension     Mild non proliferative diabetic retinopathy 10/13/2014    Nuclear sclerosis 10/13/2014    Open angle with borderline findings and low glaucoma risk in both eyes 10/13/2014    Osteoarthritis of  right hip 2015       Past Surgical History:   Procedure Laterality Date    BREAST LUMPECTOMY         Review of patient's allergies indicates:  No Known Allergies    Medications:  Continuous Infusions:   dextrose 5 % 75 mL/hr at 19 1101    insulin (HUMAN R) infusion (adults) 0.9 Units/hr (19 1012)    nicardipine Stopped (19 1000)     Scheduled Meds:   chlorhexidine  15 mL Mouth/Throat BID    famotidine  20 mg Per OG tube Daily    levothyroxine  25 mcg Per OG tube Before breakfast    nitroGLYCERIN 2% TD oint  0.5 inch Topical (Top) Q6H    polyethylene glycol  17 g Per G Tube Daily    senna-docusate 8.6-50 mg  1 tablet Per G Tube Daily     PRN Meds:dextrose 50%, dextrose 50%, sodium chloride 0.9%    Family History     Problem Relation (Age of Onset)    Cancer Maternal Aunt    No Known Problems Mother, Father, Sister, Brother, Maternal Uncle, Paternal Aunt, Paternal Uncle, Maternal Grandmother, Maternal Grandfather, Paternal Grandmother, Paternal Grandfather        Tobacco Use    Smoking status: Former Smoker     Types: Cigarettes     Last attempt to quit: 1962     Years since quittin.7    Smokeless tobacco: Never Used   Substance and Sexual Activity    Alcohol use: Yes     Alcohol/week: 0.0 oz     Comment: former heavy use of wine? difficult to assess    Drug use: No    Sexual activity: Yes     Partners: Male     Birth control/protection: Post-menopausal       Review of Systems   Unable to perform ROS: Intubated     Objective:     Vital Signs (Most Recent):  Temp: 97.9 °F (36.6 °C) (19 1133)  Pulse: 81 (19 1133)  Resp: 16 (19 1133)  BP: (!) 142/63 (19 1133)  SpO2: 97 % (19 1133) Vital Signs (24h Range):  Temp:  [93.2 °F (34 °C)-98.2 °F (36.8 °C)] 97.9 °F (36.6 °C)  Pulse:  [61-91] 81  Resp:  [11-31] 16  SpO2:  [97 %-100 %] 97 %  BP: (133-184)/(62-77) 142/63     Weight: 64.5 kg (142 lb 3.2 oz)  Body mass index is 23.66 kg/m².    Review of  Symptoms  Symptom Assessment (ESAS 0-10 scale)   ESAS 0 1 2 3 4 5 6 7 8 9 10   Pain              Dyspnea              Anxiety              Nausea              Depression               Anorexia              Fatigue              Insomnia              Restlessness               Agitation              CAM / Delirium __ --  ___+   Constipation     __ --  ___+   Diarrhea           __ --  ___+  Bowel Management Plan (BMP): No    Comments: intubated, unable to complete ESAS    Pain Assessment:     OME in 24 hours: 0    Performance Status: 10    ECOG Performance Status Grade: 4 - Completely disabled    Physical Exam   Constitutional: No distress.   Cardiovascular: Normal rate, regular rhythm and normal heart sounds.   Pulmonary/Chest:   Intubated    Abdominal: Soft. Bowel sounds are normal.   Musculoskeletal: She exhibits edema.   Neurological:   Unresponsive    Skin:   Right arm cool to touch,  Ischemic    Nursing note and vitals reviewed.      Significant Labs: All pertinent labs within the past 24 hours have been reviewed.  CBC:   Recent Labs   Lab 04/12/19 0356   WBC 19.01*   HGB 8.6*   HCT 30.1*   MCV 78*   PLT 88*     BMP:  Recent Labs   Lab 04/12/19 0356 04/12/19 0812   *  --    *  173* 174*   K 3.8  --    CL >130*  --    CO2 22*  --    BUN 37*  --    CREATININE 1.6*  --    CALCIUM 9.2  --    MG 2.6  --      LFT:  Lab Results   Component Value Date     (H) 04/12/2019    ALKPHOS 182 (H) 04/12/2019    BILITOT 1.0 04/12/2019     Albumin:   Albumin   Date Value Ref Range Status   04/12/2019 1.8 (L) 3.5 - 5.2 g/dL Final     Protein:   Total Protein   Date Value Ref Range Status   04/12/2019 5.9 (L) 6.0 - 8.4 g/dL Final     Lactic acid:   Lab Results   Component Value Date    LACTATE 2.7 (H) 04/08/2019    LACTATE 3.7 (HH) 04/07/2019       Significant Imaging: I have reviewed all pertinent imaging results/findings within the past 24 hours.    Advance Care Planning   Advanced Directives::  Living Will:  No  LaPOST: No  Do Not Resuscitate Status: No  Medical Power of : No    Decision-Making Capacity: Family answered questions       Living Arrangements: Lives with spouse    Psychosocial/Cultural:  to Kurt Silverman 14 yrs,  Second marriage.  First  ,  No children.  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     Spiritual:     F- Martha and Belief:  Baptism     I - Importance:  Went to [employer] services every week until she was dx with dementia 1.5 yrs ago  .  C - Community:     A - Address in Care: amenable to  services       > 50% of 35  min visit spent in chart review, face to face discussion of goals of care,  symptom assessment, coordination of care and emotional support.    HU Mccallum, ACNS-BC  Palliative Medicine  [facility]

## 2019-04-13 NOTE — PROGRESS NOTES
Given the okay by Anish BHAGAT and Romain CHAUDHARI of NCC team to start process to withdraw life support from pt. Family in agree with the decision to withdraw care. Mary Jo notified of plan to withdraw. Pastoral care came to bedside for family support. bereavement tray at bedside. Will continue to monitor

## 2019-04-13 NOTE — PROGRESS NOTES
Told by Mariusz MADRID to to titrate levo for systolic greater than 160. Will continue to monitor

## 2019-04-13 NOTE — PLAN OF CARE
Problem: Adult Inpatient Plan of Care  Goal: Plan of Care Review  Outcome: Ongoing (interventions implemented as appropriate)  POC reviewed with pt's fmaily at 0500. Pt's family verbalized understanding. Questions and concerns addressed. No acute events overnight. Levo titrated to maintain SBP <160. Currently infusing at 0.45 mcg/kg/min. D5 infusing at 75cc/hr. Insulin gtt being titrated, currently infusing at 1.6 units/hr. Nitro paste and warm compress applied to hands. Pt with no palpable radial pulse in right upper extremity. UO normal. Water boluses given q4. Na levels trending downward. Pt progressing toward goals. Will continue to monitor. See flowsheets for full assessment and VS info.

## 2019-04-13 NOTE — SIGNIFICANT EVENT
I was called by nurse about patient Mahmood asystole on monitor.   Patient was unresponsive, no spontaneous movements noted, no able to follow commands, eyes were closed and mouth slightly open  Neck: Unable to supple  Lungs: No breathing sounds heard. No rales or rhonchi. RR=0  CV: No heart sounds found, HR=0, asystole on monitor.  Neuro: Non-verbal, pupils fixed, no reactive, no brainstem reflexes found, no spontaneous movements.    Cause of death: R MCA CVA  Time of death: 1632 pm    Case was discussed with staff Dr. Anish Marlow Md  [employer] Neurology resident

## 2019-04-13 NOTE — PROGRESS NOTES
[facility]  Neurocritical Care  Progress Note    Admit Date: 4/7/2019  Service Date: 04/13/2019  Length of Stay: 6    Subjective:     Chief Complaint: Embolic stroke involving right middle cerebral artery    History of Present Illness: Ms. Mahmood is 78 y.o AA female with PMHx of HTN (ran out medications), DMTII (on metformin 500 mg BID), HLD (on Atorvastatin 20 mg), Hypothyroidisms(on Levothyroxine 25 mg daily) and Dementia (started 112 years ago), who was BIB EMS due to L side weakness and Gaze deviation to R. Patient was LKN yesterday around 10 pm. Her Her  reports that he found her unresponsive around 7:30 AM this morning (although he is not exactly sure that he correctly remembers the time). She had urinated on herself during the night. EMS arrived shortly after 9 AM. Per  also, patient has been very tired and she rather wants to be at her bed for the last 2 weeks.  denies fevers, chills, cough, chest pain, sob, rhinorrhea, headache, abdominal pain or changes in BM/urinary habits, other than constipation. Patient ran out of BP medications for unknown period of time. She was only taking Atorvastatin, Metformin and Levothyroxine at home. Patient does not have history of smoker or stroke in the past. She was diagnosed with dementia 1 1/2 years ago, but she was oriented to herself, walks and talks at baseline and able to bathe, dress and feed herself.    Upon arrival to ED patient was found with R gaze deviation, LUE/LLE flaccid paralysis, and RUE/RLE able to slowly do some spontaneous movements. Patient was not verbal and no able to follow commands. Patient was stroke activated and CTH reported R MCA stroke and occlusion. Patient was out of tPA window and non-candidate for thrombectomy.   History was taken by chart review and 's interview      Hospital Course: 4/8: Mri with right MCA CVA,  updated at bedside, trend lactate, hemicrani watch  04/10/2019: loss of  pupillary reaction, given mannitol   4/12: d/c statin, started d5W 75 ml/hr, family discussion POC pending   4/13: comfort measures    Review of Symptoms:   Unable to obtain, intubated, neuro-exam    Physical Exam   Constitutional: She appears well-developed.   HENT:   Head: Normocephalic and atraumatic.   Neck: Neck supple.   Cardiovascular: Normal rate.   Pulmonary/Chest:   Mechanical BS throughout   Abdominal: Soft.   Neurological: She is unresponsive. GCS eye subscore is 1. GCS verbal subscore is 1. GCS motor subscore is 2.   Pupils fixed BL, L 3mm, R 7mm  L corneal intact, R corneal absent  Absent OC to doll's eye  Cough/gag intact  Not breathing over vent at time of exam   Skin:   Cold RUE with ischemic change to distal digits     Recent Labs   Lab 04/13/19  0010   WBC 17.32*   RBC 3.40*   HGB 7.8*   HCT 26.2*   PLT 87*   MCV 77*   MCH 22.9*   MCHC 29.8*     Recent Labs   Lab 04/13/19  1314   CALCIUM 8.0*   PROT 4.9*   *  155*   K 3.3*   CO2 21*   *   BUN 32*   CREATININE 1.9*   ALKPHOS 132   ALT 63*   *   BILITOT 0.9     No results for input(s): PT, INR, APTT in the last 24 hours.  Vent Mode: A/C  Oxygen Concentration (%):  [40-41] 40  Resp Rate Total:  [14 br/min-19 br/min] 19 br/min  Vt Set:  [420 mL] 420 mL  PEEP/CPAP:  [5 cmH20] 5 cmH20  Mean Airway Pressure:  [8.5 cmH20-9.4 cmH20] 9.4 cmH20    Temp: 99 °F (37.2 °C)  Pulse: 104  Rhythm: normal sinus rhythm  BP: (!) 161/68  MAP (mmHg): 98  CVP (mean): 239 mmHg  Resp: 20  SpO2: 96 %  Oxygen Concentration (%): 40  O2 Device (Oxygen Therapy): ventilator  Vent Mode: A/C  Set Rate: 14 bmp  Vt Set: 420 mL  PEEP/CPAP: 5 cmH20  Peak Airway Pressure: 23 cmH2O  Mean Airway Pressure: 9.4 cmH20  Plateau Pressure: 19 cmH20    Temp  Min: 91.4 °F (33 °C)  Max: 99 °F (37.2 °C)  Pulse  Min: 56  Max: 104  BP  Min: 87/50  Max: 168/72  MAP (mmHg)  Min: 64  Max: 103  CVP (mean)  Min: 1 mmHg  Max: 239 mmHg  Resp  Min: 14  Max: 20  SpO2  Min: 95 %  Max: 99  %  Oxygen Concentration (%)  Min: 40  Max: 41     0701 -  0700  In: 4622.7 [I.V.:2670.7]  Out: 1785 [Urine:1785]   Unmeasured Output  Stool Occurrence: 0    Nutrition Prescription Ordered  Current Diet Order: NPO  Last Bowel Movement: 19    Body mass index is 23.66 kg/m².    I have personally reviewed all labs, imaging, and studies today    Assessment/Plan:     Neuro  * Embolic stroke involving right middle cerebral artery  Right MCA CVA   updated and MRI reviewed with him at bedside  hemicrani consult - not a surgical candidate as per NSGY  PT  OT  VN following  [id number]@[email or web address]  [email or web address]    Once comfort measures begun will dc tube feeds      [place] coma scale total score 3-8  Due to stroke  Palliative care consulted for goals of care discussion    Brain compression  Due to stroke  See Stroke  Monitor Na  NSGY aware  hemicrani consult - not a surgical candidate    Cytotoxic cerebral edema  Due to stroke  Na goal 150-160  titrate fluids as needed    Pulmonary  Acute respiratory failure with hypoxia  No vent weaning for now  Daily abg  Daily cxr  Vent Mode: A/C  Oxygen Concentration (%):  [40-41] 40  Resp Rate Total:  [14 br/min-19 br/min] 19 br/min  Vt Set:  [420 mL] 420 mL  PEEP/CPAP:  [5 cmH20] 5 cmH20  Mean Airway Pressure:  [8.5 cmH20-9.4 cmH20] 9.4 cmH20         Cardiac/Vascular  Ischemia of hand  Consulted vascular surgery - no surgical intervention at this time.  Continue neuropaste and hot packs      Essential hypertension  Goal 100-160      Renal/  Stage 5 chronic kidney disease not on chronic dialysis  Much improved     Endocrine  Acquired hypothyroidism  Continue on home levothyroxine 25 mcg daily      Type 2 diabetes mellitus with neurologic complication  Patient with Hx of DMTII, home med Metformin (hold it)  A1c: 11.4, K: 5.9, B, B-OH 6.4 on DKA (likely 2/2 insulin deficiency)  On insulin drip, IVF maintenance  BG Q/4hour  Hypoglycemia protocol ordered    Other  Limitation of  activities due to disability  PT  OT          The patient is being Prophylaxed for:  Venous Thromboembolism with: Mechanical  Stress Ulcer with: H2B  Ventilator Pneumonia with: chlorhexidine oral care    Activity Orders          Diet NPO: NPO starting at 04/07 1024        DNR    Rob Oconnell MD  Neurocritical Care  [facility]    Cc time 40 minutes  Critical Care was time spent personally by me on the following activities: development of treatment plan with patient or surrogate and bedside caregivers, discussions with consultants, evaluation of patient's response to treatment, examination of patient, ordering and performing treatments and interventions, ordering and review of laboratory studies, ordering and review of radiographic studies, pulse oximetry, re-evaluation of patient's condition. This critical care time did not overlap with that of any other provider or involve time for any procedures.

## 2019-04-13 NOTE — ASSESSMENT & PLAN NOTE
Right MCA CVA   updated and MRI reviewed with him at bedside  hemicrani consult - not a surgical candidate as per NSGY  PT  OT  VN following  [email or web address]@75  [email or web address]    Once comfort measures begun will dc tube feeds

## 2019-04-13 NOTE — PROGRESS NOTES
Pt declared dead at 1632. LUIS notified. Pt family at bedside at time of death. All belongings removed from room. Chaplian notified. Will continue to monitor

## 2019-04-15 NOTE — PT/OT/SLP DISCHARGE
Occupational Therapy Discharge Summary    Sherita Mahmood  MRN: [id number]   Principal Problem: Embolic stroke involving right middle cerebral artery      Patient Discharged from acute Occupational Therapy on .  Please refer to prior OT note for functional status.    Assessment:      Patient appropriate for care in another setting.    Objective:     GOALS:   Multidisciplinary Problems     Occupational Therapy Goals        Problem: Occupational Therapy Goal    Goal Priority Disciplines Outcome Interventions   Occupational Therapy Goal     OT, PT/OT     Description:  Goals set  to be addressed for 14 days with expiration date, :  Patient will increase functional independence with ADLs by performing:    Patient will demonstrate rolling to the right with max assist.  Not met   Patient will demonstrate rolling to the left with max assist.   Not met  Patient will demonstrate supine -sit with max  assist.   Not met  Patient will demonstrate squat pivot transfers with max assist.   Not met  Patient will demonstrate grooming while seated with max assist.   Not met  Patient will demonstrate upper body dressing with max assist while seated EOB.   Not met  Patient will demonstrate lower body dressing with max assist while seated EOB.   Not met  Patient will demonstrate toileting with max assist.   Not met  Patient will demonstrate bathing while seated EOB with max assist.   Not met  Patient will demonstrate ability to follow 2/5 commands.   Not met  Patient's family / caregiver will demonstrate independence and safety with assisting patient with self-care skills and functional mobility.     Not met  Patient's family / caregiver will demonstrate independence with providing ROM and changes in bed positioning.   Not met                        Reasons for Discontinuation of Therapy Services  Transfer to alternate level of care.      Plan:     Patient Discharged to: Patient .    LUCIANA Sharp  2019

## 2019-04-15 NOTE — PLAN OF CARE
04/15/19 0914   Final Note   Assessment Type Final Discharge Note   Anticipated Discharge Disposition

## 2019-04-16 NOTE — DISCHARGE SUMMARY
[facility]  Neurocritical Care  Discharge Summary    Admit Date: 4/7/2019    Service Date: 04/16/2019    Discharge Date: 4/13/2019    Length of Stay: 6    Final Active Diagnoses:    Diagnosis Date Noted POA    PRINCIPAL PROBLEM:  Embolic stroke involving right middle cerebral artery [I63.411] 04/07/2019 Yes    Advanced care planning/counseling discussion [Z71.89]  Not Applicable    Palliative care encounter [Z51.5] 04/10/2019 Not Applicable    Hypernatremia [E87.0] 04/09/2019 Yes    Hypovolemia [E86.1] 04/09/2019 Yes    Elevated hemoglobin A1c [R73.09] 04/09/2019 Yes    Type 2 diabetes mellitus with hyperosmolarity, with long-term current use of insulin [E11.00, Z79.4] 04/09/2019 Not Applicable    SIRS (systemic inflammatory response syndrome) [R65.10] 04/09/2019 Yes    ATN (acute tubular necrosis) [N17.0] 04/09/2019 Yes    Malignant hypertension requiring acute intensive management [I10] 04/09/2019 Yes    Lactic acidosis [E87.2] 04/09/2019 Yes    Acute ischemic left middle cerebral artery (MCA) stroke [I63.512]  Yes    Ischemia of hand [I99.8] 04/08/2019 No    Nontraumatic cortical hemorrhage of right cerebral hemisphere [I61.1] 04/08/2019 No    Brain compression [G93.5] 04/08/2019 Yes    Bairon coma scale total score 3-8 [R40.2430] 04/08/2019 Yes    Limitation of activities due to disability [Z73.6] 04/08/2019 Yes    Acute respiratory failure with hypoxia [J96.01] 04/08/2019 Yes    Electrolyte abnormality [E87.8] 04/08/2019 Yes    Leukocytosis [D72.829] 04/08/2019 Yes    HLD (hyperlipidemia) [E78.5] 04/08/2019 Yes    Stage 5 chronic kidney disease not on chronic dialysis [N18.5] 04/07/2019 Yes    Acquired hypothyroidism [E03.9] 04/07/2019 Yes    Cytotoxic cerebral edema [G93.6] 04/07/2019 Yes    Goals of care, counseling/discussion [Z71.89] 04/07/2019 Not Applicable    Essential hypertension [I10] 08/01/2012 Yes    Type 2 diabetes mellitus with neurologic  complication [E11.49] 08/01/2012 Yes      Problems Resolved During this Admission:      History of Present Illness: Ms. Mahmood is 78 y.o AA female with PMHx of HTN (ran out medications), DMTII (on metformin 500 mg BID), HLD (on Atorvastatin 20 mg), Hypothyroidisms(on Levothyroxine 25 mg daily) and Dementia (started 112 years ago), who was BIB EMS due to L side weakness and Gaze deviation to R. Patient was LKN yesterday around 10 pm. Her Her  reports that he found her unresponsive around 7:30 AM this morning (although he is not exactly sure that he correctly remembers the time). She had urinated on herself during the night. EMS arrived shortly after 9 AM. Per  also, patient has been very tired and she rather wants to be at her bed for the last 2 weeks.  denies fevers, chills, cough, chest pain, sob, rhinorrhea, headache, abdominal pain or changes in BM/urinary habits, other than constipation. Patient ran out of BP medications for unknown period of time. She was only taking Atorvastatin, Metformin and Levothyroxine at home. Patient does not have history of smoker or stroke in the past. She was diagnosed with dementia 1 1/2 years ago, but she was oriented to herself, walks and talks at baseline and able to bathe, dress and feed herself.    Upon arrival to ED patient was found with R gaze deviation, LUE/LLE flaccid paralysis, and RUE/RLE able to slowly do some spontaneous movements. Patient was not verbal and no able to follow commands. Patient was stroke activated and CTH reported R MCA stroke and occlusion. Patient was out of tPA window and non-candidate for thrombectomy.   History was taken by chart review and 's interview      Hospital Course by Event: 4/8: Mri with right MCA CVA,  updated at bedside, trend lactate, hemicrani watch  04/10/2019: loss of pupillary reaction, given mannitol   4/12: d/c statin, started d5W 75 ml/hr, family discussion POC pending   4/13: comfort  measures-->    Hospital Course by Problem:   * Embolic stroke involving right middle cerebral artery  Right MCA CVA   updated and MRI reviewed with him at bedside  hemicrani consult - not a surgical candidate as per NSGY  PT  OT  VN following  [id number]@[email or web address]  [email or web address]@[email or web address]    Once comfort measures begun will dc tube feeds      Acute respiratory failure with hypoxia  No vent weaning for now  Daily abg  Daily cxr  Vent Mode: A/C  Oxygen Concentration (%):  [40-41] 40  Resp Rate Total:  [14 br/min-19 br/min] 19 br/min  Vt Set:  [420 mL] 420 mL  PEEP/CPAP:  [5 cmH20] 5 cmH20  Mean Airway Pressure:  [8.5 cmH20-9.4 cmH20] 9.4 cmH20         Limitation of activities due to disability  PT  OT    Bairon coma scale total score 3-8  Due to stroke  Palliative care consulted for goals of care discussion    Brain compression  Due to stroke  See Stroke  Monitor Na  NSGY aware  hemicrani consult - not a surgical candidate    Ischemia of hand  Consulted vascular surgery - no surgical intervention at this time.  Continue neuropaste and hot packs      Cytotoxic cerebral edema  Due to stroke  Na goal 150-160  titrate fluids as needed    Acquired hypothyroidism  Continue on home levothyroxine 25 mcg daily      Stage 5 chronic kidney disease not on chronic dialysis  Much improved     Type 2 diabetes mellitus with neurologic complication  Patient with Hx of DMTII, home med Metformin (hold it)  A1c: 11.4, K: 5.9, B, B-OH 6.4 on DKA (likely 2/2 insulin deficiency)  On insulin drip, IVF maintenance  BG Q/4hour  Hypoglycemia protocol ordered    Essential hypertension  Goal 100-160        Significant Results:  Imaging:  See PACs    Laboratory:  Lab Results   Component Value Date    HGBA1C 11.4 (H) 2019    CHOL 290 (H) 2019    HDL 49 2019    LDLCALC 192.2 (H) 2019    TRIG 244 (H) 2019    TSH 2.690 2019       Pending Results: none    Consultations:  IP CONSULT TO VASCULAR (STROKE) NEUROLOGY  IP  CONSULT TO VASCULAR (STROKE) NEUROLOGY  IP CONSULT TO PHYSICAL MEDICINE REHAB  IP CONSULT TO REGISTERED DIETITIAN/NUTRITIONIST  IP CONSULT TO VASCULAR SURGERY  IP CONSULT TO PALLIATIVE CARE  IP consult to Neurosurgery    Procedures:   Arterial catheter placement  Central line placement    Medications:   No discharge meds     Diet: tube feeds    Activity: As tolerated.    Disposition: [place] [facility] [place].    Follow Up Plan:  none    This discharge took > than 30 minutes to complete.    Rob Oconnell MD  Neurocritical Care  [facility]

## 2021-06-16 NOTE — ASSESSMENT & PLAN NOTE
Date: 4/7/2021 Status: [facility]   Time: 11:20 AM Length: 20   Visit Type: VIDEO VISIT RETURN [1702] Copay: $0.00   Provider: Zoila Holliday NP          PT  OT